# Patient Record
Sex: FEMALE | Race: WHITE | Employment: OTHER | URBAN - METROPOLITAN AREA
[De-identification: names, ages, dates, MRNs, and addresses within clinical notes are randomized per-mention and may not be internally consistent; named-entity substitution may affect disease eponyms.]

---

## 2017-09-01 PROBLEM — I87.2 VENOUS INSUFFICIENCY (CHRONIC) (PERIPHERAL): Status: ACTIVE | Noted: 2017-09-01

## 2017-09-01 PROBLEM — L30.9 DERMATITIS: Status: ACTIVE | Noted: 2017-09-01

## 2018-03-13 ENCOUNTER — OFFICE VISIT (OUTPATIENT)
Dept: PRIMARY CARE CLINIC | Age: 83
End: 2018-03-13
Payer: COMMERCIAL

## 2018-03-13 VITALS
RESPIRATION RATE: 16 BRPM | OXYGEN SATURATION: 98 % | HEIGHT: 61 IN | DIASTOLIC BLOOD PRESSURE: 60 MMHG | HEART RATE: 70 BPM | SYSTOLIC BLOOD PRESSURE: 110 MMHG

## 2018-03-13 DIAGNOSIS — S81.801D WOUND OF RIGHT LOWER EXTREMITY, SUBSEQUENT ENCOUNTER: ICD-10-CM

## 2018-03-13 DIAGNOSIS — J20.9 ACUTE BRONCHITIS, UNSPECIFIED ORGANISM: Primary | ICD-10-CM

## 2018-03-13 PROCEDURE — 99213 OFFICE O/P EST LOW 20 MIN: CPT | Performed by: PHYSICIAN ASSISTANT

## 2018-03-13 RX ORDER — ALPRAZOLAM 0.5 MG/1
0.5 TABLET ORAL NIGHTLY PRN
Qty: 30 TABLET | Refills: 0 | Status: SHIPPED | OUTPATIENT
Start: 2018-03-13 | End: 2018-04-05 | Stop reason: SDUPTHER

## 2018-03-13 RX ORDER — DOXYCYCLINE HYCLATE 100 MG
100 TABLET ORAL 2 TIMES DAILY
Qty: 20 TABLET | Refills: 0 | Status: SHIPPED | OUTPATIENT
Start: 2018-03-13 | End: 2018-03-23

## 2018-03-13 RX ORDER — ALPRAZOLAM 0.5 MG/1
0.5 TABLET ORAL NIGHTLY PRN
Qty: 30 TABLET | Status: CANCELLED | OUTPATIENT
Start: 2018-03-13

## 2018-03-13 NOTE — PROGRESS NOTES
Chief Complaint:   Cough (non productive, chest congestion, began 1 week ago) and Fall (pt fell and was treated in the ER she states it is not getting any better)    History of Present Illness   Source of history provided by:  patient. Jaquelin Mendosa is a 81 y.o. old female with a past medical history of:   Past Medical History:   Diagnosis Date    Hyperlipidemia     Hypertension    presents with a cough for the past 7 days. States the cough is not productive. She is a smoker. Denies any fever, chills, sore throat, headache, ear pain, N/V/D, abdominal pain, CP, progressive SOB, dizziness, or lethargy. Patient also reports recent ER visit due to a fall which causes a skin tear on the right shin. Tdap was up to date and she was give Keflex which she has been taking as directed. She states redness has worsened the last few days. She has a history of wounds that have required wound care visits. She does not have a history of DM. Denies drainage from the area, fever, chills, myalgias, nausea, vomiting, swelling, or any streaking. ROS    Unless otherwise stated in this report or unable to obtain because of the patient's clinical or mental status as evidenced by the medical record, this patients's positive and negative responses for Review of Systems, constitutional, psych, eyes, ENT, cardiovascular, respiratory, gastrointestinal, neurological, genitourinary, musculoskeletal, integument systems and systems related to the presenting problem are either stated in the preceding or were not pertinent or were negative for the symptoms and/or complaints related to the medical problem. Past Surgical History:  has no past surgical history on file. Social History:  reports that she has been smoking Cigarettes. She has been smoking about 1.00 pack per day. She has never used smokeless tobacco. She reports that she does not drink alcohol or use drugs. Family History: family history is not on file.    Allergies:

## 2018-03-21 ENCOUNTER — HOSPITAL ENCOUNTER (OUTPATIENT)
Dept: WOUND CARE | Age: 83
Discharge: HOME | End: 2018-03-21

## 2018-03-22 DIAGNOSIS — Z79.899 HIGH RISK MEDICATION USE: ICD-10-CM

## 2018-03-22 DIAGNOSIS — N18.30 CHRONIC KIDNEY DISEASE, STAGE III (MODERATE) (HCC): ICD-10-CM

## 2018-03-22 DIAGNOSIS — E55.9 VITAMIN D DEFICIENCY: ICD-10-CM

## 2018-03-22 DIAGNOSIS — J44.9 CHRONIC OBSTRUCTIVE PULMONARY DISEASE, UNSPECIFIED COPD TYPE (HCC): ICD-10-CM

## 2018-03-22 DIAGNOSIS — E78.49 OTHER HYPERLIPIDEMIA: ICD-10-CM

## 2018-03-22 DIAGNOSIS — R53.83 FATIGUE, UNSPECIFIED TYPE: Primary | ICD-10-CM

## 2018-03-28 ENCOUNTER — HOSPITAL ENCOUNTER (OUTPATIENT)
Dept: WOUND CARE | Age: 83
Discharge: HOME | End: 2018-03-28
Payer: MEDICARE

## 2018-03-28 ENCOUNTER — HOSPITAL ENCOUNTER (OUTPATIENT)
Age: 83
Discharge: HOME | End: 2018-03-30
Payer: MEDICARE

## 2018-03-28 VITALS
HEIGHT: 61 IN | DIASTOLIC BLOOD PRESSURE: 60 MMHG | WEIGHT: 170 LBS | SYSTOLIC BLOOD PRESSURE: 92 MMHG | TEMPERATURE: 98 F | HEART RATE: 72 BPM | RESPIRATION RATE: 18 BRPM | BODY MASS INDEX: 32.1 KG/M2

## 2018-03-28 DIAGNOSIS — I82.4Z9 DEEP VEIN THROMBOSIS (DVT) OF DISTAL VEIN OF LOWER EXTREMITY, UNSPECIFIED CHRONICITY, UNSPECIFIED LATERALITY (HCC): ICD-10-CM

## 2018-03-28 DIAGNOSIS — I73.9 PVD (PERIPHERAL VASCULAR DISEASE) (HCC): Primary | ICD-10-CM

## 2018-03-28 DIAGNOSIS — L97.912 TRAUMATIC ULCER OF LOWER LEG, RIGHT, WITH FAT LAYER EXPOSED (HCC): ICD-10-CM

## 2018-03-28 PROCEDURE — 99213 OFFICE O/P EST LOW 20 MIN: CPT

## 2018-03-28 PROCEDURE — 87075 CULTR BACTERIA EXCEPT BLOOD: CPT

## 2018-03-28 PROCEDURE — 87070 CULTURE OTHR SPECIMN AEROBIC: CPT

## 2018-03-28 PROCEDURE — 87077 CULTURE AEROBIC IDENTIFY: CPT

## 2018-03-28 PROCEDURE — 87186 SC STD MICRODIL/AGAR DIL: CPT

## 2018-03-28 PROCEDURE — 11042 DBRDMT SUBQ TIS 1ST 20SQCM/<: CPT

## 2018-03-29 ENCOUNTER — HOSPITAL ENCOUNTER (OUTPATIENT)
Age: 83
Discharge: HOME | End: 2018-03-31
Payer: MEDICARE

## 2018-03-29 DIAGNOSIS — J44.9 CHRONIC OBSTRUCTIVE PULMONARY DISEASE, UNSPECIFIED COPD TYPE (HCC): ICD-10-CM

## 2018-03-29 DIAGNOSIS — Z79.899 HIGH RISK MEDICATION USE: ICD-10-CM

## 2018-03-29 DIAGNOSIS — E78.49 OTHER HYPERLIPIDEMIA: ICD-10-CM

## 2018-03-29 DIAGNOSIS — R53.83 FATIGUE, UNSPECIFIED TYPE: ICD-10-CM

## 2018-03-29 DIAGNOSIS — E55.9 VITAMIN D DEFICIENCY: ICD-10-CM

## 2018-03-29 DIAGNOSIS — N18.30 CHRONIC KIDNEY DISEASE, STAGE III (MODERATE) (HCC): ICD-10-CM

## 2018-03-29 LAB
ALBUMIN SERPL-MCNC: 3.7 G/DL (ref 3.5–5.2)
ALP BLD-CCNC: 87 U/L (ref 35–104)
ALT SERPL-CCNC: 13 U/L (ref 0–32)
ANION GAP SERPL CALCULATED.3IONS-SCNC: 16 MMOL/L (ref 7–16)
AST SERPL-CCNC: 22 U/L (ref 0–31)
BACTERIA: ABNORMAL /HPF
BILIRUB SERPL-MCNC: 0.5 MG/DL (ref 0–1.2)
BILIRUBIN URINE: NEGATIVE
BLOOD, URINE: NEGATIVE
BUN BLDV-MCNC: 25 MG/DL (ref 8–23)
CALCIUM SERPL-MCNC: 8.8 MG/DL (ref 8.6–10.2)
CHLORIDE BLD-SCNC: 104 MMOL/L (ref 98–107)
CHOLESTEROL, TOTAL: 102 MG/DL (ref 0–199)
CLARITY: CLEAR
CO2: 22 MMOL/L (ref 22–29)
COLOR: YELLOW
CREAT SERPL-MCNC: 1.1 MG/DL (ref 0.5–1)
CREATININE URINE: 174 MG/DL (ref 29–226)
GFR AFRICAN AMERICAN: 58
GFR NON-AFRICAN AMERICAN: 48 ML/MIN/1.73
GLUCOSE BLD-MCNC: 88 MG/DL (ref 74–109)
GLUCOSE URINE: NEGATIVE MG/DL
HBA1C MFR BLD: 5.4 % (ref 4.8–5.9)
HCT VFR BLD CALC: 41.5 % (ref 34–48)
HDLC SERPL-MCNC: 39 MG/DL
HEMOGLOBIN: 13.6 G/DL (ref 11.5–15.5)
KETONES, URINE: NEGATIVE MG/DL
LDL CHOLESTEROL CALCULATED: 25 MG/DL (ref 0–99)
LEUKOCYTE ESTERASE, URINE: ABNORMAL
MCH RBC QN AUTO: 31.9 PG (ref 26–35)
MCHC RBC AUTO-ENTMCNC: 32.8 % (ref 32–34.5)
MCV RBC AUTO: 97.4 FL (ref 80–99.9)
MICROALBUMIN UR-MCNC: 36.1 MG/L
MICROALBUMIN/CREAT UR-RTO: 20.7 (ref 0–30)
NITRITE, URINE: NEGATIVE
PDW BLD-RTO: 12.6 FL (ref 11.5–15)
PH UA: 5 (ref 5–9)
PLATELET # BLD: 222 E9/L (ref 130–450)
PMV BLD AUTO: 14.1 FL (ref 7–12)
POTASSIUM SERPL-SCNC: 4.4 MMOL/L (ref 3.5–5)
PROTEIN UA: NEGATIVE MG/DL
RBC # BLD: 4.26 E12/L (ref 3.5–5.5)
RBC UA: ABNORMAL /HPF (ref 0–2)
RENAL EPITHELIAL, UA: ABNORMAL /HPF
SODIUM BLD-SCNC: 142 MMOL/L (ref 132–146)
SPECIFIC GRAVITY UA: 1.02 (ref 1–1.03)
TOTAL PROTEIN: 6.5 G/DL (ref 6.4–8.3)
TRIGL SERPL-MCNC: 188 MG/DL (ref 0–149)
TSH SERPL DL<=0.05 MIU/L-ACNC: 2.28 UIU/ML (ref 0.27–4.2)
URIC ACID, SERUM: 6.7 MG/DL (ref 2.4–5.7)
UROBILINOGEN, URINE: 0.2 E.U./DL
VITAMIN D 25-HYDROXY: 30 NG/ML (ref 30–100)
VLDLC SERPL CALC-MCNC: 38 MG/DL
WBC # BLD: 7.6 E9/L (ref 4.5–11.5)
WBC UA: ABNORMAL /HPF (ref 0–5)

## 2018-03-29 PROCEDURE — 82044 UR ALBUMIN SEMIQUANTITATIVE: CPT

## 2018-03-29 PROCEDURE — 82306 VITAMIN D 25 HYDROXY: CPT

## 2018-03-29 PROCEDURE — 82570 ASSAY OF URINE CREATININE: CPT

## 2018-03-29 PROCEDURE — 85027 COMPLETE CBC AUTOMATED: CPT

## 2018-03-29 PROCEDURE — 84550 ASSAY OF BLOOD/URIC ACID: CPT

## 2018-03-29 PROCEDURE — 84443 ASSAY THYROID STIM HORMONE: CPT

## 2018-03-29 PROCEDURE — 80053 COMPREHEN METABOLIC PANEL: CPT

## 2018-03-29 PROCEDURE — 81001 URINALYSIS AUTO W/SCOPE: CPT

## 2018-03-29 PROCEDURE — 83036 HEMOGLOBIN GLYCOSYLATED A1C: CPT

## 2018-03-29 PROCEDURE — 80061 LIPID PANEL: CPT

## 2018-03-30 LAB — ANAEROBIC CULTURE: NORMAL

## 2018-03-31 LAB
ORGANISM: ABNORMAL
ORGANISM: ABNORMAL
WOUND/ABSCESS: ABNORMAL

## 2018-04-04 ENCOUNTER — HOSPITAL ENCOUNTER (OUTPATIENT)
Dept: WOUND CARE | Age: 83
Discharge: HOME | End: 2018-04-04

## 2018-04-05 ENCOUNTER — OFFICE VISIT (OUTPATIENT)
Dept: PRIMARY CARE CLINIC | Age: 83
End: 2018-04-05
Payer: MEDICARE

## 2018-04-05 VITALS
BODY MASS INDEX: 31.91 KG/M2 | RESPIRATION RATE: 12 BRPM | SYSTOLIC BLOOD PRESSURE: 104 MMHG | OXYGEN SATURATION: 97 % | TEMPERATURE: 98.2 F | HEIGHT: 61 IN | WEIGHT: 169 LBS | HEART RATE: 63 BPM | DIASTOLIC BLOOD PRESSURE: 62 MMHG

## 2018-04-05 DIAGNOSIS — E78.2 MIXED HYPERLIPIDEMIA: ICD-10-CM

## 2018-04-05 DIAGNOSIS — F17.200 SMOKER: ICD-10-CM

## 2018-04-05 DIAGNOSIS — R53.83 FATIGUE, UNSPECIFIED TYPE: ICD-10-CM

## 2018-04-05 DIAGNOSIS — S81.801D WOUND OF RIGHT LOWER EXTREMITY, SUBSEQUENT ENCOUNTER: ICD-10-CM

## 2018-04-05 DIAGNOSIS — R73.09 ELEVATED HEMOGLOBIN A1C: ICD-10-CM

## 2018-04-05 DIAGNOSIS — I35.0 NONRHEUMATIC AORTIC VALVE STENOSIS: ICD-10-CM

## 2018-04-05 DIAGNOSIS — J43.0 UNILATERAL EMPHYSEMA (HCC): ICD-10-CM

## 2018-04-05 DIAGNOSIS — I10 ESSENTIAL HYPERTENSION: ICD-10-CM

## 2018-04-05 DIAGNOSIS — M89.9 DISORDER OF BONE: ICD-10-CM

## 2018-04-05 PROCEDURE — 99214 OFFICE O/P EST MOD 30 MIN: CPT | Performed by: INTERNAL MEDICINE

## 2018-04-05 RX ORDER — ROSUVASTATIN CALCIUM 10 MG/1
10 TABLET, COATED ORAL DAILY
Qty: 30 TABLET | Refills: 5 | Status: SHIPPED | OUTPATIENT
Start: 2018-04-05 | End: 2018-10-17 | Stop reason: SDUPTHER

## 2018-04-05 RX ORDER — VALSARTAN 80 MG/1
80 TABLET ORAL DAILY
Qty: 30 TABLET | Refills: 5 | Status: SHIPPED | OUTPATIENT
Start: 2018-04-05 | End: 2018-10-17

## 2018-04-05 RX ORDER — ALPRAZOLAM 0.5 MG/1
TABLET ORAL
Qty: 30 TABLET | Refills: 2 | Status: SHIPPED | OUTPATIENT
Start: 2018-04-05 | End: 2018-07-05

## 2018-04-05 ASSESSMENT — ENCOUNTER SYMPTOMS
EYE REDNESS: 0
DIARRHEA: 0
STRIDOR: 0
BLURRED VISION: 0
DOUBLE VISION: 0
BLOOD IN STOOL: 0
SHORTNESS OF BREATH: 0
HEMOPTYSIS: 0
EYE PAIN: 0
NAUSEA: 0

## 2018-04-05 ASSESSMENT — PATIENT HEALTH QUESTIONNAIRE - PHQ9
2. FEELING DOWN, DEPRESSED OR HOPELESS: 1
SUM OF ALL RESPONSES TO PHQ QUESTIONS 1-9: 2
SUM OF ALL RESPONSES TO PHQ9 QUESTIONS 1 & 2: 2
1. LITTLE INTEREST OR PLEASURE IN DOING THINGS: 1

## 2018-04-10 ENCOUNTER — HOSPITAL ENCOUNTER (OUTPATIENT)
Dept: ULTRASOUND IMAGING | Age: 83
Discharge: HOME | End: 2018-04-12
Payer: MEDICARE

## 2018-04-10 ENCOUNTER — HOSPITAL ENCOUNTER (OUTPATIENT)
Dept: INTERVENTIONAL RADIOLOGY/VASCULAR | Age: 83
Discharge: HOME | End: 2018-04-12
Payer: MEDICARE

## 2018-04-10 DIAGNOSIS — I82.4Z9 DEEP VEIN THROMBOSIS (DVT) OF DISTAL VEIN OF LOWER EXTREMITY, UNSPECIFIED CHRONICITY, UNSPECIFIED LATERALITY (HCC): ICD-10-CM

## 2018-04-10 DIAGNOSIS — I73.9 PVD (PERIPHERAL VASCULAR DISEASE) (HCC): ICD-10-CM

## 2018-04-10 PROCEDURE — 93923 UPR/LXTR ART STDY 3+ LVLS: CPT

## 2018-04-10 PROCEDURE — 93970 EXTREMITY STUDY: CPT

## 2018-04-11 ENCOUNTER — HOSPITAL ENCOUNTER (OUTPATIENT)
Dept: WOUND CARE | Age: 83
Discharge: HOME | End: 2018-04-11
Payer: MEDICARE

## 2018-04-11 VITALS
DIASTOLIC BLOOD PRESSURE: 62 MMHG | RESPIRATION RATE: 16 BRPM | HEIGHT: 61 IN | BODY MASS INDEX: 31.91 KG/M2 | HEART RATE: 68 BPM | SYSTOLIC BLOOD PRESSURE: 98 MMHG | TEMPERATURE: 97.4 F | WEIGHT: 169 LBS

## 2018-04-11 DIAGNOSIS — L97.912 TRAUMATIC ULCER OF LOWER LEG, RIGHT, WITH FAT LAYER EXPOSED (HCC): ICD-10-CM

## 2018-04-11 PROCEDURE — 99211 OFF/OP EST MAY X REQ PHY/QHP: CPT

## 2018-08-07 ENCOUNTER — TELEPHONE (OUTPATIENT)
Dept: PRIMARY CARE CLINIC | Age: 83
End: 2018-08-07

## 2018-08-07 DIAGNOSIS — I10 ESSENTIAL HYPERTENSION: Primary | ICD-10-CM

## 2018-08-07 RX ORDER — IRBESARTAN 75 MG/1
75 TABLET ORAL DAILY
Qty: 30 TABLET | Refills: 3 | Status: SHIPPED | OUTPATIENT
Start: 2018-08-07 | End: 2018-10-17 | Stop reason: SDUPTHER

## 2018-08-07 NOTE — TELEPHONE ENCOUNTER
Patient on valsartan 80 mg, will change to Avapro 75 mg 1 daily 30 pills with 3 refills provided to the pharmacy on record

## 2018-09-14 ENCOUNTER — TELEPHONE (OUTPATIENT)
Dept: PRIMARY CARE CLINIC | Age: 83
End: 2018-09-14

## 2018-09-14 NOTE — TELEPHONE ENCOUNTER
RX for Alprazolam was faxed to [facility] [phone] on 9/11/18. Confirmed successful transmission.

## 2018-10-09 ENCOUNTER — TELEPHONE (OUTPATIENT)
Dept: PRIMARY CARE CLINIC | Age: 83
End: 2018-10-09

## 2018-10-09 DIAGNOSIS — R53.83 FATIGUE, UNSPECIFIED TYPE: ICD-10-CM

## 2018-10-09 DIAGNOSIS — J43.0 UNILATERAL EMPHYSEMA (HCC): Primary | ICD-10-CM

## 2018-10-09 DIAGNOSIS — I10 ESSENTIAL HYPERTENSION: ICD-10-CM

## 2018-10-09 DIAGNOSIS — I87.2 VENOUS INSUFFICIENCY (CHRONIC) (PERIPHERAL): ICD-10-CM

## 2018-10-09 DIAGNOSIS — I35.0 NONRHEUMATIC AORTIC VALVE STENOSIS: ICD-10-CM

## 2018-10-09 DIAGNOSIS — M89.9 DISORDER OF BONE: ICD-10-CM

## 2018-10-09 DIAGNOSIS — F17.200 SMOKER: ICD-10-CM

## 2018-10-09 DIAGNOSIS — R73.09 ELEVATED HEMOGLOBIN A1C: ICD-10-CM

## 2018-10-10 ENCOUNTER — HOSPITAL ENCOUNTER (OUTPATIENT)
Age: 83
Discharge: HOME | End: 2018-10-12
Payer: MEDICARE

## 2018-10-10 DIAGNOSIS — I87.2 VENOUS INSUFFICIENCY (CHRONIC) (PERIPHERAL): ICD-10-CM

## 2018-10-10 DIAGNOSIS — F17.200 SMOKER: ICD-10-CM

## 2018-10-10 DIAGNOSIS — I35.0 NONRHEUMATIC AORTIC VALVE STENOSIS: ICD-10-CM

## 2018-10-10 DIAGNOSIS — R53.83 FATIGUE, UNSPECIFIED TYPE: ICD-10-CM

## 2018-10-10 DIAGNOSIS — I10 ESSENTIAL HYPERTENSION: ICD-10-CM

## 2018-10-10 DIAGNOSIS — R73.09 ELEVATED HEMOGLOBIN A1C: ICD-10-CM

## 2018-10-10 DIAGNOSIS — J43.0 UNILATERAL EMPHYSEMA (HCC): ICD-10-CM

## 2018-10-10 DIAGNOSIS — M89.9 DISORDER OF BONE: ICD-10-CM

## 2018-10-10 LAB
ALBUMIN SERPL-MCNC: 3.9 G/DL (ref 3.5–5.2)
ALP BLD-CCNC: 98 U/L (ref 35–104)
ALT SERPL-CCNC: 17 U/L (ref 0–32)
ANION GAP SERPL CALCULATED.3IONS-SCNC: 18 MMOL/L (ref 7–16)
AST SERPL-CCNC: 24 U/L (ref 0–31)
BASOPHILS ABSOLUTE: 0.05 E9/L (ref 0–0.2)
BASOPHILS RELATIVE PERCENT: 0.6 % (ref 0–2)
BILIRUB SERPL-MCNC: 0.2 MG/DL (ref 0–1.2)
BILIRUBIN URINE: NEGATIVE
BLOOD, URINE: NEGATIVE
BUN BLDV-MCNC: 20 MG/DL (ref 8–23)
CALCIUM SERPL-MCNC: 8.6 MG/DL (ref 8.6–10.2)
CHLORIDE BLD-SCNC: 102 MMOL/L (ref 98–107)
CHOLESTEROL, TOTAL: 118 MG/DL (ref 0–199)
CLARITY: CLEAR
CO2: 20 MMOL/L (ref 22–29)
COLOR: YELLOW
CREAT SERPL-MCNC: 1.2 MG/DL (ref 0.5–1)
EOSINOPHILS ABSOLUTE: 0 E9/L (ref 0.05–0.5)
EOSINOPHILS RELATIVE PERCENT: 0 % (ref 0–6)
GFR AFRICAN AMERICAN: 52
GFR NON-AFRICAN AMERICAN: 43 ML/MIN/1.73
GLUCOSE BLD-MCNC: 89 MG/DL (ref 74–109)
GLUCOSE URINE: NEGATIVE MG/DL
HBA1C MFR BLD: 5.3 % (ref 4–5.6)
HCT VFR BLD CALC: 40.4 % (ref 34–48)
HDLC SERPL-MCNC: 36 MG/DL
HEMOGLOBIN: 13.1 G/DL (ref 11.5–15.5)
IMMATURE GRANULOCYTES #: 0.02 E9/L
IMMATURE GRANULOCYTES %: 0.2 % (ref 0–5)
KETONES, URINE: NEGATIVE MG/DL
LDL CHOLESTEROL CALCULATED: 46 MG/DL (ref 0–99)
LEUKOCYTE ESTERASE, URINE: NEGATIVE
LYMPHOCYTES ABSOLUTE: 2.61 E9/L (ref 1.5–4)
LYMPHOCYTES RELATIVE PERCENT: 31.3 % (ref 20–42)
MCH RBC QN AUTO: 31.6 PG (ref 26–35)
MCHC RBC AUTO-ENTMCNC: 32.4 % (ref 32–34.5)
MCV RBC AUTO: 97.3 FL (ref 80–99.9)
MONOCYTES ABSOLUTE: 0.58 E9/L (ref 0.1–0.95)
MONOCYTES RELATIVE PERCENT: 6.9 % (ref 2–12)
NEUTROPHILS ABSOLUTE: 5.09 E9/L (ref 1.8–7.3)
NEUTROPHILS RELATIVE PERCENT: 61 % (ref 43–80)
NITRITE, URINE: NEGATIVE
PDW BLD-RTO: 12.7 FL (ref 11.5–15)
PH UA: 5 (ref 5–9)
PLATELET # BLD: 225 E9/L (ref 130–450)
PMV BLD AUTO: 13.8 FL (ref 7–12)
POTASSIUM SERPL-SCNC: 4.3 MMOL/L (ref 3.5–5)
PROTEIN UA: NEGATIVE MG/DL
RBC # BLD: 4.15 E12/L (ref 3.5–5.5)
SODIUM BLD-SCNC: 140 MMOL/L (ref 132–146)
SPECIFIC GRAVITY UA: 1.02 (ref 1–1.03)
TOTAL PROTEIN: 6.7 G/DL (ref 6.4–8.3)
TRIGL SERPL-MCNC: 179 MG/DL (ref 0–149)
TSH SERPL DL<=0.05 MIU/L-ACNC: 2.53 UIU/ML (ref 0.27–4.2)
UROBILINOGEN, URINE: 0.2 E.U./DL
VLDLC SERPL CALC-MCNC: 36 MG/DL
WBC # BLD: 8.4 E9/L (ref 4.5–11.5)

## 2018-10-10 PROCEDURE — 83036 HEMOGLOBIN GLYCOSYLATED A1C: CPT

## 2018-10-10 PROCEDURE — 85025 COMPLETE CBC W/AUTO DIFF WBC: CPT

## 2018-10-10 PROCEDURE — 82652 VIT D 1 25-DIHYDROXY: CPT

## 2018-10-10 PROCEDURE — 84443 ASSAY THYROID STIM HORMONE: CPT

## 2018-10-10 PROCEDURE — 80053 COMPREHEN METABOLIC PANEL: CPT

## 2018-10-10 PROCEDURE — 80061 LIPID PANEL: CPT

## 2018-10-10 PROCEDURE — 81003 URINALYSIS AUTO W/O SCOPE: CPT

## 2018-10-13 LAB — VITAMIN D 1,25-DIHYDROXY: 31.4 PG/ML (ref 19.9–79.3)

## 2018-10-17 ENCOUNTER — OFFICE VISIT (OUTPATIENT)
Dept: PRIMARY CARE CLINIC | Age: 83
End: 2018-10-17
Payer: MEDICARE

## 2018-10-17 VITALS
RESPIRATION RATE: 18 BRPM | HEIGHT: 61 IN | SYSTOLIC BLOOD PRESSURE: 112 MMHG | WEIGHT: 179 LBS | DIASTOLIC BLOOD PRESSURE: 62 MMHG | TEMPERATURE: 98.3 F | BODY MASS INDEX: 33.79 KG/M2 | HEART RATE: 91 BPM | OXYGEN SATURATION: 98 %

## 2018-10-17 DIAGNOSIS — F51.01 PRIMARY INSOMNIA: ICD-10-CM

## 2018-10-17 DIAGNOSIS — Z00.00 ROUTINE GENERAL MEDICAL EXAMINATION AT A HEALTH CARE FACILITY: ICD-10-CM

## 2018-10-17 DIAGNOSIS — Z71.85 IMMUNIZATION COUNSELING: ICD-10-CM

## 2018-10-17 DIAGNOSIS — E78.2 MIXED HYPERLIPIDEMIA: ICD-10-CM

## 2018-10-17 DIAGNOSIS — I35.0 NONRHEUMATIC AORTIC VALVE STENOSIS: ICD-10-CM

## 2018-10-17 DIAGNOSIS — Z23 ENCOUNTER FOR IMMUNIZATION: Primary | ICD-10-CM

## 2018-10-17 DIAGNOSIS — J44.9 CHRONIC OBSTRUCTIVE PULMONARY DISEASE, UNSPECIFIED COPD TYPE (HCC): ICD-10-CM

## 2018-10-17 DIAGNOSIS — I10 ESSENTIAL HYPERTENSION: ICD-10-CM

## 2018-10-17 DIAGNOSIS — F41.9 ANXIETY: ICD-10-CM

## 2018-10-17 PROCEDURE — 99214 OFFICE O/P EST MOD 30 MIN: CPT | Performed by: INTERNAL MEDICINE

## 2018-10-17 RX ORDER — MULTIVITAMIN WITH IRON
100 TABLET ORAL DAILY
COMMUNITY

## 2018-10-17 RX ORDER — ROSUVASTATIN CALCIUM 10 MG/1
10 TABLET, COATED ORAL DAILY
Qty: 30 TABLET | Refills: 3 | Status: SHIPPED | OUTPATIENT
Start: 2018-10-17 | End: 2019-03-11 | Stop reason: SDUPTHER

## 2018-10-17 RX ORDER — ALPRAZOLAM 0.5 MG/1
0.5 TABLET ORAL NIGHTLY PRN
Qty: 30 TABLET | Refills: 2 | Status: SHIPPED | OUTPATIENT
Start: 2018-10-17 | End: 2019-01-17 | Stop reason: SDUPTHER

## 2018-10-17 RX ORDER — FLUOCINONIDE 0.5 MG/G
OINTMENT TOPICAL
Refills: 3 | COMMUNITY
Start: 2018-08-29 | End: 2021-01-11 | Stop reason: SDUPTHER

## 2018-10-17 RX ORDER — LANOLIN ALCOHOL/MO/W.PET/CERES
1000 CREAM (GRAM) TOPICAL DAILY
COMMUNITY

## 2018-10-17 RX ORDER — ALPRAZOLAM 0.5 MG/1
0.5 TABLET ORAL NIGHTLY PRN
COMMUNITY
End: 2018-10-17 | Stop reason: SDUPTHER

## 2018-10-17 RX ORDER — IRBESARTAN 75 MG/1
75 TABLET ORAL DAILY
Qty: 30 TABLET | Refills: 3 | Status: SHIPPED | OUTPATIENT
Start: 2018-10-17 | End: 2019-05-28

## 2018-10-17 ASSESSMENT — LIFESTYLE VARIABLES: HOW OFTEN DO YOU HAVE A DRINK CONTAINING ALCOHOL: 0

## 2018-10-17 NOTE — PATIENT INSTRUCTIONS
Personalized Preventive Plan for Beni Ramos - 10/17/2018  Medicare offers a range of preventive health benefits. Some of the tests and screenings are paid in full while other may be subject to a deductible, co-insurance, and/or copay. Some of these benefits include a comprehensive review of your medical history including lifestyle, illnesses that may run in your family, and various assessments and screenings as appropriate. After reviewing your medical record and screening and assessments performed today your provider may have ordered immunizations, labs, imaging, and/or referrals for you. A list of these orders (if applicable) as well as your Preventive Care list are included within your After Visit Summary for your review. Other Preventive Recommendations:    · A preventive eye exam performed by an eye specialist is recommended every 1-2 years to screen for glaucoma; cataracts, macular degeneration, and other eye disorders. · A preventive dental visit is recommended every 6 months. · Try to get at least 150 minutes of exercise per week or 10,000 steps per day on a pedometer . · Order or download the FREE \"Exercise & Physical Activity: Your Everyday Guide\" from [employer]. Call 4-115.619.2470 or search [employer] online. · You need 1140-3910 mg of calcium and 6021-0595 IU of vitamin D per day. It is possible to meet your calcium requirement with diet alone, but a vitamin D supplement is usually necessary to meet this goal.  · When exposed to the sun, use a sunscreen that protects against both UVA and UVB radiation with an SPF of 30 or greater. Reapply every 2 to 3 hours or after sweating, drying off with a towel, or swimming. · Always wear a seat belt when traveling in a car. Always wear a helmet when riding a bicycle or motorcycle.

## 2018-10-17 NOTE — PROGRESS NOTES
Medicare Annual Wellness Visit  Name: Bishop Dawn Date: 10/17/2018   MRN: [id number] Sex: Female   Age: 81 y.o. Ethnicity: Non-/Non    : 1935 Race: Jocy Noriega is here for Medicare AWV; Anxiety; Hypertension; Hyperlipidemia; Discuss Labs; and Health Maintenance (due for shingles,dexa scan,pneumo,Flu)    Screenings for behavioral, psychosocial and functional/safety risks, and cognitive dysfunction are all negative except as indicated below. These results, as well as other patient data from the [address] form, are documented in Flowsheets linked to this Encounter. No Known Allergies  Prior to Visit Medications    Medication Sig Taking? Authorizing Provider   fluocinonide (LIDEX) 0.05 % ointment APPLY TWICE A DAY AS NEEDED FOR FLARES Yes Historical Provider, MD   vitamin B-6 (PYRIDOXINE) 100 MG tablet Take 100 mg by mouth daily Yes Historical Provider, MD   Cholecalciferol (VITAMIN D3) 1000 units CAPS Take by mouth daily Yes Historical Provider, MD   vitamin B-12 (CYANOCOBALAMIN) 1000 MCG tablet Take 1,000 mcg by mouth daily Yes Historical Provider, MD   ALPRAZolam (XANAX) 0.5 MG tablet Take 0.5 mg by mouth nightly as needed for Sleep. . Yes Historical Provider, MD   irbesartan (AVAPRO) 75 MG tablet Take 1 tablet by mouth daily Yes Christie Galeas MD   rosuvastatin (CRESTOR) 10 MG tablet Take 1 tablet by mouth daily Yes Christie Galeas MD     Past Medical History:   Diagnosis Date    Aortic stenosis     COPD (chronic obstructive pulmonary disease) ([facility] [address]     Disorder of bone     Elevated hemoglobin A1c     Fatigue     Hyperlipidemia     Hypertension     Smoker     Wound of right leg      No past surgical history on file. No family history on file.     CareTeam (Including outside providers/suppliers regularly involved in providing care):   Patient Care Team:  Christie Galeas MD as PCP - Saskia Cali MD as PCP - JEROMES Attributed

## 2018-10-17 NOTE — PROGRESS NOTES
Annual wellness visit, patient was given a review of past history current complaints    Patient has been followed for the following    1. I agree chronic anxiety and insomnia alprazolam has helped, takes 0.5 mg 1 daily. ,  Today she was given 30 tablets, 2 refills. The patient will call at the end of 3 months and be able to get another refill for that medication. She will then be seen back in 6 months time. 2.  Reviewed immunizations, patient chooses to have these at the pharmacy originally she wanted to have the flu shot here but canceled at the last medic and will have the flu shot done at the pharmacy along with all the other immunizations that I delineated for her including pneumonia and tetanus and shingles    3. The patient was seen by cardiology one year ago for valvular heart disease, an appointment has been made to be seen by [facility] cardiology regarding her aortic valve disease    4. The patient has chronic obstructive pulmonary disease, still smokes cigarettes,   Has seen Jf Armenta, in the past.  I made an appointment for her to be reevaluated. 5.  Laboratory studies done prior to this visit were reviewed generally at goal for the patient. 6.  Patient has declined mammography. 7.  The patient does describe having quite a bit of anxiety and sleeplessness is in depends on the alprazolam for this. I did tell her there are other alternative medications which she can try. However she is not interested in making a transition at this time. Plan  Continue current medications  See cardiology  Pulmonary evaluation  Immunization at pharmacy  Try and reduce use of alprazolam, 0.5, 30 pills, one daily, 2 refills provided today.     Patient will see me back in 6 months time with diagnostic laboratory studies

## 2019-01-07 ENCOUNTER — HOSPITAL ENCOUNTER (EMERGENCY)
Age: 84
Discharge: HOME | End: 2019-01-07
Payer: MEDICARE

## 2019-01-07 ENCOUNTER — TELEPHONE (OUTPATIENT)
Dept: PRIMARY CARE CLINIC | Age: 84
End: 2019-01-07

## 2019-01-07 VITALS
HEIGHT: 61 IN | HEART RATE: 73 BPM | SYSTOLIC BLOOD PRESSURE: 118 MMHG | RESPIRATION RATE: 16 BRPM | TEMPERATURE: 98.1 F | BODY MASS INDEX: 33.99 KG/M2 | DIASTOLIC BLOOD PRESSURE: 56 MMHG | WEIGHT: 180 LBS | OXYGEN SATURATION: 97 %

## 2019-01-07 DIAGNOSIS — Z51.89 ENCOUNTER FOR POST-TRAUMATIC WOUND CHECK: Primary | ICD-10-CM

## 2019-01-07 PROCEDURE — 99282 EMERGENCY DEPT VISIT SF MDM: CPT

## 2019-01-07 PROCEDURE — 6370000000 HC RX 637 (ALT 250 FOR IP): Performed by: PHYSICIAN ASSISTANT

## 2019-01-07 RX ORDER — GINSENG 100 MG
CAPSULE ORAL ONCE
Status: COMPLETED | OUTPATIENT
Start: 2019-01-07 | End: 2019-01-07

## 2019-01-07 RX ADMIN — BACITRACIN: 500 OINTMENT TOPICAL at 12:40

## 2019-01-09 ENCOUNTER — HOSPITAL ENCOUNTER (EMERGENCY)
Age: 84
Discharge: HOME | End: 2019-01-09
Attending: EMERGENCY MEDICINE
Payer: MEDICARE

## 2019-01-09 ENCOUNTER — CARE COORDINATION (OUTPATIENT)
Dept: CARE COORDINATION | Age: 84
End: 2019-01-09

## 2019-01-09 VITALS
SYSTOLIC BLOOD PRESSURE: 138 MMHG | TEMPERATURE: 97.7 F | OXYGEN SATURATION: 98 % | BODY MASS INDEX: 33.99 KG/M2 | HEIGHT: 61 IN | RESPIRATION RATE: 18 BRPM | HEART RATE: 82 BPM | WEIGHT: 180 LBS | DIASTOLIC BLOOD PRESSURE: 64 MMHG

## 2019-01-09 DIAGNOSIS — L08.9 LEG ABRASION, INFECTED, LEFT, INITIAL ENCOUNTER: Primary | ICD-10-CM

## 2019-01-09 DIAGNOSIS — F17.210 CIGARETTE SMOKER: ICD-10-CM

## 2019-01-09 DIAGNOSIS — S80.812A LEG ABRASION, INFECTED, LEFT, INITIAL ENCOUNTER: Primary | ICD-10-CM

## 2019-01-09 DIAGNOSIS — I87.8 CHRONIC VENOUS STASIS: ICD-10-CM

## 2019-01-09 DIAGNOSIS — Z23 NEED FOR TDAP VACCINATION: ICD-10-CM

## 2019-01-09 PROCEDURE — 6360000002 HC RX W HCPCS: Performed by: NURSE PRACTITIONER

## 2019-01-09 PROCEDURE — 90715 TDAP VACCINE 7 YRS/> IM: CPT | Performed by: NURSE PRACTITIONER

## 2019-01-09 PROCEDURE — 90471 IMMUNIZATION ADMIN: CPT | Performed by: NURSE PRACTITIONER

## 2019-01-09 PROCEDURE — 99282 EMERGENCY DEPT VISIT SF MDM: CPT

## 2019-01-09 RX ORDER — CEPHALEXIN 500 MG/1
500 CAPSULE ORAL 4 TIMES DAILY
Qty: 40 CAPSULE | Refills: 0 | Status: SHIPPED | OUTPATIENT
Start: 2019-01-09 | End: 2019-01-19

## 2019-01-09 RX ADMIN — TETANUS TOXOID, REDUCED DIPHTHERIA TOXOID AND ACELLULAR PERTUSSIS VACCINE, ADSORBED 0.5 ML: 5; 2.5; 8; 8; 2.5 SUSPENSION INTRAMUSCULAR at 13:20

## 2019-01-09 ASSESSMENT — PAIN DESCRIPTION - LOCATION: LOCATION: LEG

## 2019-01-09 ASSESSMENT — PAIN SCALES - GENERAL: PAINLEVEL_OUTOF10: 7

## 2019-01-09 ASSESSMENT — PAIN DESCRIPTION - PAIN TYPE: TYPE: ACUTE PAIN

## 2019-01-09 ASSESSMENT — PAIN DESCRIPTION - FREQUENCY: FREQUENCY: INTERMITTENT

## 2019-01-09 ASSESSMENT — PAIN DESCRIPTION - PROGRESSION: CLINICAL_PROGRESSION: NOT CHANGED

## 2019-01-09 ASSESSMENT — PAIN DESCRIPTION - ORIENTATION: ORIENTATION: LEFT;LOWER

## 2019-01-09 ASSESSMENT — PAIN DESCRIPTION - DESCRIPTORS: DESCRIPTORS: PATIENT UNABLE TO DESCRIBE

## 2019-01-17 DIAGNOSIS — F41.9 ANXIETY: ICD-10-CM

## 2019-01-17 DIAGNOSIS — F51.01 PRIMARY INSOMNIA: Primary | ICD-10-CM

## 2019-01-17 DIAGNOSIS — F51.01 PRIMARY INSOMNIA: ICD-10-CM

## 2019-01-17 RX ORDER — ALPRAZOLAM 0.5 MG/1
0.5 TABLET ORAL NIGHTLY PRN
Qty: 30 TABLET | Refills: 1 | Status: SHIPPED | OUTPATIENT
Start: 2019-01-17 | End: 2019-02-16

## 2019-01-17 RX ORDER — ALPRAZOLAM 0.5 MG/1
0.5 TABLET ORAL NIGHTLY PRN
Qty: 30 TABLET | Refills: 0 | Status: CANCELLED | OUTPATIENT
Start: 2019-01-17 | End: 2019-02-16

## 2019-01-17 RX ORDER — ALPRAZOLAM 0.5 MG/1
TABLET ORAL
Refills: 2 | COMMUNITY
Start: 2018-12-17 | End: 2019-01-17

## 2019-01-17 RX ORDER — ALPRAZOLAM 0.5 MG/1
0.5 TABLET ORAL NIGHTLY PRN
Qty: 30 TABLET | Refills: 0 | OUTPATIENT
Start: 2019-01-17 | End: 2019-02-16

## 2019-01-21 ENCOUNTER — HOSPITAL ENCOUNTER (EMERGENCY)
Age: 84
Discharge: HOME | End: 2019-01-21
Attending: EMERGENCY MEDICINE
Payer: MEDICARE

## 2019-01-21 VITALS
OXYGEN SATURATION: 100 % | BODY MASS INDEX: 30.73 KG/M2 | RESPIRATION RATE: 14 BRPM | TEMPERATURE: 98.1 F | WEIGHT: 180 LBS | HEART RATE: 80 BPM | DIASTOLIC BLOOD PRESSURE: 56 MMHG | SYSTOLIC BLOOD PRESSURE: 124 MMHG | HEIGHT: 64 IN

## 2019-01-21 DIAGNOSIS — Z51.89 VISIT FOR WOUND CHECK: Primary | ICD-10-CM

## 2019-01-21 PROCEDURE — 99282 EMERGENCY DEPT VISIT SF MDM: CPT

## 2019-01-24 ENCOUNTER — CARE COORDINATION (OUTPATIENT)
Dept: CARE COORDINATION | Age: 84
End: 2019-01-24

## 2019-01-28 ENCOUNTER — TELEPHONE (OUTPATIENT)
Dept: PRIMARY CARE CLINIC | Age: 84
End: 2019-01-28

## 2019-01-28 RX ORDER — LOSARTAN POTASSIUM 50 MG/1
50 TABLET ORAL DAILY
Qty: 90 TABLET | Refills: 0 | Status: SHIPPED | OUTPATIENT
Start: 2019-01-28 | End: 2019-05-08 | Stop reason: SDUPTHER

## 2019-02-04 ENCOUNTER — HOSPITAL ENCOUNTER (OUTPATIENT)
Age: 84
Discharge: HOME | End: 2019-02-06
Payer: MEDICARE

## 2019-02-04 ENCOUNTER — OFFICE VISIT (OUTPATIENT)
Dept: FAMILY MEDICINE CLINIC | Age: 84
End: 2019-02-04
Payer: MEDICARE

## 2019-02-04 VITALS
HEIGHT: 61 IN | TEMPERATURE: 97.7 F | OXYGEN SATURATION: 95 % | DIASTOLIC BLOOD PRESSURE: 68 MMHG | WEIGHT: 176.8 LBS | RESPIRATION RATE: 18 BRPM | SYSTOLIC BLOOD PRESSURE: 118 MMHG | BODY MASS INDEX: 33.38 KG/M2 | HEART RATE: 72 BPM

## 2019-02-04 DIAGNOSIS — F51.01 PRIMARY INSOMNIA: ICD-10-CM

## 2019-02-04 DIAGNOSIS — S81.802A WOUND OF LEFT LOWER EXTREMITY, INITIAL ENCOUNTER: ICD-10-CM

## 2019-02-04 DIAGNOSIS — I35.0 NONRHEUMATIC AORTIC VALVE STENOSIS: ICD-10-CM

## 2019-02-04 DIAGNOSIS — J43.0 UNILATERAL EMPHYSEMA (HCC): Primary | ICD-10-CM

## 2019-02-04 DIAGNOSIS — F41.9 ANXIETY: ICD-10-CM

## 2019-02-04 DIAGNOSIS — Z91.81 AT HIGH RISK FOR FALLS: ICD-10-CM

## 2019-02-04 DIAGNOSIS — E78.2 MIXED HYPERLIPIDEMIA: ICD-10-CM

## 2019-02-04 DIAGNOSIS — I10 ESSENTIAL HYPERTENSION: ICD-10-CM

## 2019-02-04 LAB
AMPHETAMINE SCREEN, URINE: NOT DETECTED
BARBITURATE SCREEN URINE: NOT DETECTED
BENZODIAZEPINE SCREEN, URINE: POSITIVE
CANNABINOID SCREEN URINE: NOT DETECTED
COCAINE METABOLITE SCREEN URINE: NOT DETECTED
METHADONE SCREEN, URINE: NOT DETECTED
OPIATE SCREEN URINE: NOT DETECTED
PHENCYCLIDINE SCREEN URINE: NOT DETECTED
PROPOXYPHENE SCREEN: NOT DETECTED

## 2019-02-04 PROCEDURE — 80307 DRUG TEST PRSMV CHEM ANLYZR: CPT

## 2019-02-04 PROCEDURE — G0480 DRUG TEST DEF 1-7 CLASSES: HCPCS

## 2019-02-04 PROCEDURE — 99214 OFFICE O/P EST MOD 30 MIN: CPT | Performed by: FAMILY MEDICINE

## 2019-02-04 ASSESSMENT — PATIENT HEALTH QUESTIONNAIRE - PHQ9
2. FEELING DOWN, DEPRESSED OR HOPELESS: 0
SUM OF ALL RESPONSES TO PHQ QUESTIONS 1-9: 0
1. LITTLE INTEREST OR PLEASURE IN DOING THINGS: 0
SUM OF ALL RESPONSES TO PHQ QUESTIONS 1-9: 0
SUM OF ALL RESPONSES TO PHQ9 QUESTIONS 1 & 2: 0

## 2019-02-04 ASSESSMENT — ENCOUNTER SYMPTOMS
BACK PAIN: 0
ABDOMINAL PAIN: 0
SHORTNESS OF BREATH: 0
SINUS PRESSURE: 0
SORE THROAT: 0
DIARRHEA: 0
EYE PAIN: 0
COUGH: 0
CONSTIPATION: 0

## 2019-02-09 LAB
7-AMINOCLONAZEPAM, URINE: <5 NG/ML
ALPHA-HYDROXYALPRAZOLAM, URINE: 299 NG/ML
ALPHA-HYDROXYMIDAZOLAM, URINE: <20 NG/ML
ALPRAZOLAM, URINE: 264 NG/ML
CHLORDIAZEPOXIDE, URINE: <20 NG/ML
CLONAZEPAM, URINE: <5 NG/ML
DIAZEPAM, URINE: <20 NG/ML
LORAZEPAM, URINE: <20 NG/ML
MIDAZOLAM, URINE: <20 NG/ML
NORDIAZEPAM, URINE: <20 NG/ML
OXAZEPAM, URINE: <20 NG/ML
TEMAZEPAM, URINE: <20 NG/ML

## 2019-03-11 ENCOUNTER — OFFICE VISIT (OUTPATIENT)
Dept: FAMILY MEDICINE CLINIC | Age: 84
End: 2019-03-11
Payer: MEDICARE

## 2019-03-11 VITALS
RESPIRATION RATE: 18 BRPM | SYSTOLIC BLOOD PRESSURE: 118 MMHG | HEART RATE: 81 BPM | BODY MASS INDEX: 29.88 KG/M2 | WEIGHT: 175 LBS | HEIGHT: 64 IN | DIASTOLIC BLOOD PRESSURE: 80 MMHG | OXYGEN SATURATION: 99 %

## 2019-03-11 DIAGNOSIS — J01.90 ACUTE BACTERIAL SINUSITIS: ICD-10-CM

## 2019-03-11 DIAGNOSIS — I35.0 NONRHEUMATIC AORTIC VALVE STENOSIS: Primary | ICD-10-CM

## 2019-03-11 DIAGNOSIS — G47.00 INSOMNIA, UNSPECIFIED TYPE: ICD-10-CM

## 2019-03-11 DIAGNOSIS — E78.2 MIXED HYPERLIPIDEMIA: ICD-10-CM

## 2019-03-11 DIAGNOSIS — B96.89 ACUTE BACTERIAL SINUSITIS: ICD-10-CM

## 2019-03-11 PROCEDURE — 99214 OFFICE O/P EST MOD 30 MIN: CPT | Performed by: FAMILY MEDICINE

## 2019-03-11 RX ORDER — CEFDINIR 300 MG/1
300 CAPSULE ORAL 2 TIMES DAILY
Qty: 14 CAPSULE | Refills: 0 | Status: SHIPPED | OUTPATIENT
Start: 2019-03-11 | End: 2019-03-18

## 2019-03-11 RX ORDER — ROSUVASTATIN CALCIUM 10 MG/1
10 TABLET, COATED ORAL DAILY
Qty: 30 TABLET | Refills: 3 | Status: SHIPPED | OUTPATIENT
Start: 2019-03-11 | End: 2019-05-28 | Stop reason: SDUPTHER

## 2019-03-11 ASSESSMENT — ENCOUNTER SYMPTOMS
EYE PAIN: 0
BACK PAIN: 0
SORE THROAT: 1
DIARRHEA: 0
SHORTNESS OF BREATH: 0
COUGH: 1
CONSTIPATION: 0
SINUS PRESSURE: 0
ABDOMINAL PAIN: 0

## 2019-03-19 ENCOUNTER — OFFICE VISIT (OUTPATIENT)
Dept: FAMILY MEDICINE CLINIC | Age: 84
End: 2019-03-19
Payer: MEDICARE

## 2019-03-19 VITALS
HEIGHT: 61 IN | RESPIRATION RATE: 20 BRPM | WEIGHT: 176 LBS | BODY MASS INDEX: 33.23 KG/M2 | SYSTOLIC BLOOD PRESSURE: 122 MMHG | OXYGEN SATURATION: 98 % | DIASTOLIC BLOOD PRESSURE: 64 MMHG | HEART RATE: 71 BPM

## 2019-03-19 DIAGNOSIS — F51.01 PRIMARY INSOMNIA: ICD-10-CM

## 2019-03-19 DIAGNOSIS — F41.1 GENERALIZED ANXIETY DISORDER: ICD-10-CM

## 2019-03-19 DIAGNOSIS — H10.13 ALLERGIC CONJUNCTIVITIS OF BOTH EYES: Primary | ICD-10-CM

## 2019-03-19 PROCEDURE — 99213 OFFICE O/P EST LOW 20 MIN: CPT | Performed by: PHYSICIAN ASSISTANT

## 2019-03-19 RX ORDER — LORATADINE 10 MG/1
10 TABLET ORAL DAILY
Qty: 15 TABLET | Refills: 0 | Status: SHIPPED | OUTPATIENT
Start: 2019-03-19 | End: 2019-04-18

## 2019-03-19 RX ORDER — ALPRAZOLAM 0.5 MG/1
0.5 TABLET ORAL NIGHTLY PRN
Qty: 90 TABLET | Refills: 0 | Status: SHIPPED | OUTPATIENT
Start: 2019-03-19 | End: 2019-05-28 | Stop reason: SDUPTHER

## 2019-03-19 RX ORDER — ALPRAZOLAM 0.5 MG/1
0.5 TABLET ORAL NIGHTLY PRN
COMMUNITY
End: 2019-03-19 | Stop reason: SDUPTHER

## 2019-03-19 RX ORDER — OLOPATADINE HYDROCHLORIDE 1 MG/ML
1 SOLUTION/ DROPS OPHTHALMIC 2 TIMES DAILY
Qty: 5 ML | Refills: 0 | Status: SHIPPED | OUTPATIENT
Start: 2019-03-19 | End: 2019-04-18

## 2019-03-26 ENCOUNTER — TELEPHONE (OUTPATIENT)
Dept: FAMILY MEDICINE CLINIC | Age: 84
End: 2019-03-26

## 2019-03-26 RX ORDER — AZELASTINE HYDROCHLORIDE 0.5 MG/ML
1 SOLUTION/ DROPS OPHTHALMIC 2 TIMES DAILY
Qty: 6 ML | Refills: 1 | Status: SHIPPED | OUTPATIENT
Start: 2019-03-26 | End: 2019-04-25

## 2019-05-08 RX ORDER — LOSARTAN POTASSIUM 50 MG/1
50 TABLET ORAL DAILY
Qty: 90 TABLET | Refills: 0 | Status: SHIPPED | OUTPATIENT
Start: 2019-05-08 | End: 2019-05-28 | Stop reason: SDUPTHER

## 2019-05-28 ENCOUNTER — OFFICE VISIT (OUTPATIENT)
Dept: PRIMARY CARE CLINIC | Age: 84
End: 2019-05-28
Payer: MEDICARE

## 2019-05-28 ENCOUNTER — HOSPITAL ENCOUNTER (OUTPATIENT)
Age: 84
Discharge: HOME | End: 2019-05-30
Payer: MEDICARE

## 2019-05-28 VITALS
TEMPERATURE: 97.7 F | BODY MASS INDEX: 31.93 KG/M2 | HEART RATE: 60 BPM | OXYGEN SATURATION: 97 % | SYSTOLIC BLOOD PRESSURE: 108 MMHG | HEIGHT: 61 IN | RESPIRATION RATE: 18 BRPM | WEIGHT: 169.12 LBS | DIASTOLIC BLOOD PRESSURE: 72 MMHG

## 2019-05-28 DIAGNOSIS — I10 ESSENTIAL HYPERTENSION: ICD-10-CM

## 2019-05-28 DIAGNOSIS — I10 ESSENTIAL HYPERTENSION: Primary | ICD-10-CM

## 2019-05-28 DIAGNOSIS — E78.2 MIXED HYPERLIPIDEMIA: ICD-10-CM

## 2019-05-28 DIAGNOSIS — Z23 NEED FOR PROPHYLACTIC VACCINATION AGAINST STREPTOCOCCUS PNEUMONIAE (PNEUMOCOCCUS): ICD-10-CM

## 2019-05-28 DIAGNOSIS — L30.9 DERMATITIS: ICD-10-CM

## 2019-05-28 DIAGNOSIS — F41.1 GENERALIZED ANXIETY DISORDER: ICD-10-CM

## 2019-05-28 DIAGNOSIS — F17.200 SMOKER: ICD-10-CM

## 2019-05-28 DIAGNOSIS — I35.0 NONRHEUMATIC AORTIC VALVE STENOSIS: ICD-10-CM

## 2019-05-28 DIAGNOSIS — F51.01 PRIMARY INSOMNIA: ICD-10-CM

## 2019-05-28 LAB
ALBUMIN SERPL-MCNC: 4.2 G/DL (ref 3.5–5.2)
ALP BLD-CCNC: 105 U/L (ref 35–104)
ALT SERPL-CCNC: 17 U/L (ref 0–32)
ANION GAP SERPL CALCULATED.3IONS-SCNC: 16 MMOL/L (ref 7–16)
AST SERPL-CCNC: 21 U/L (ref 0–31)
BASOPHILS ABSOLUTE: 0.05 E9/L (ref 0–0.2)
BASOPHILS RELATIVE PERCENT: 0.6 % (ref 0–2)
BILIRUB SERPL-MCNC: 0.4 MG/DL (ref 0–1.2)
BUN BLDV-MCNC: 21 MG/DL (ref 8–23)
CALCIUM SERPL-MCNC: 9.1 MG/DL (ref 8.6–10.2)
CHLORIDE BLD-SCNC: 105 MMOL/L (ref 98–107)
CO2: 20 MMOL/L (ref 22–29)
CREAT SERPL-MCNC: 1.1 MG/DL (ref 0.5–1)
EOSINOPHILS ABSOLUTE: 0 E9/L (ref 0.05–0.5)
EOSINOPHILS RELATIVE PERCENT: 0 % (ref 0–6)
GFR AFRICAN AMERICAN: 57
GFR NON-AFRICAN AMERICAN: 47 ML/MIN/1.73
GLUCOSE BLD-MCNC: 97 MG/DL (ref 74–99)
HCT VFR BLD CALC: 39.4 % (ref 34–48)
HEMOGLOBIN: 12.9 G/DL (ref 11.5–15.5)
IMMATURE GRANULOCYTES #: 0.02 E9/L
IMMATURE GRANULOCYTES %: 0.2 % (ref 0–5)
LYMPHOCYTES ABSOLUTE: 2.91 E9/L (ref 1.5–4)
LYMPHOCYTES RELATIVE PERCENT: 33.6 % (ref 20–42)
MCH RBC QN AUTO: 31.7 PG (ref 26–35)
MCHC RBC AUTO-ENTMCNC: 32.7 % (ref 32–34.5)
MCV RBC AUTO: 96.8 FL (ref 80–99.9)
MONOCYTES ABSOLUTE: 0.63 E9/L (ref 0.1–0.95)
MONOCYTES RELATIVE PERCENT: 7.3 % (ref 2–12)
NEUTROPHILS ABSOLUTE: 5.05 E9/L (ref 1.8–7.3)
NEUTROPHILS RELATIVE PERCENT: 58.3 % (ref 43–80)
PDW BLD-RTO: 12.3 FL (ref 11.5–15)
PLATELET # BLD: 230 E9/L (ref 130–450)
PMV BLD AUTO: 13.8 FL (ref 7–12)
POTASSIUM SERPL-SCNC: 4.5 MMOL/L (ref 3.5–5)
RBC # BLD: 4.07 E12/L (ref 3.5–5.5)
SODIUM BLD-SCNC: 141 MMOL/L (ref 132–146)
TOTAL PROTEIN: 7.1 G/DL (ref 6.4–8.3)
TSH SERPL DL<=0.05 MIU/L-ACNC: 2.81 UIU/ML (ref 0.27–4.2)
WBC # BLD: 8.7 E9/L (ref 4.5–11.5)

## 2019-05-28 PROCEDURE — 80053 COMPREHEN METABOLIC PANEL: CPT

## 2019-05-28 PROCEDURE — G0009 ADMIN PNEUMOCOCCAL VACCINE: HCPCS | Performed by: FAMILY MEDICINE

## 2019-05-28 PROCEDURE — 84443 ASSAY THYROID STIM HORMONE: CPT

## 2019-05-28 PROCEDURE — 90670 PCV13 VACCINE IM: CPT | Performed by: FAMILY MEDICINE

## 2019-05-28 PROCEDURE — 85025 COMPLETE CBC W/AUTO DIFF WBC: CPT

## 2019-05-28 PROCEDURE — 99214 OFFICE O/P EST MOD 30 MIN: CPT | Performed by: FAMILY MEDICINE

## 2019-05-28 RX ORDER — BETAMETHASONE DIPROPIONATE 0.05 %
OINTMENT (GRAM) TOPICAL
Refills: 1 | COMMUNITY
Start: 2019-04-02 | End: 2022-03-01 | Stop reason: ALTCHOICE

## 2019-05-28 RX ORDER — ALPRAZOLAM 0.5 MG/1
0.5 TABLET ORAL NIGHTLY PRN
Qty: 30 TABLET | Refills: 2 | Status: SHIPPED | OUTPATIENT
Start: 2019-05-28 | End: 2019-08-26

## 2019-05-28 RX ORDER — ROSUVASTATIN CALCIUM 10 MG/1
10 TABLET, COATED ORAL DAILY
Qty: 90 TABLET | Refills: 1 | Status: SHIPPED | OUTPATIENT
Start: 2019-05-28 | End: 2019-12-10 | Stop reason: SDUPTHER

## 2019-05-28 RX ORDER — CITALOPRAM 10 MG/1
10 TABLET ORAL DAILY
Qty: 30 TABLET | Refills: 5 | Status: SHIPPED | OUTPATIENT
Start: 2019-05-28 | End: 2019-08-27

## 2019-05-28 RX ORDER — LOSARTAN POTASSIUM 50 MG/1
50 TABLET ORAL DAILY
Qty: 90 TABLET | Refills: 1 | Status: SHIPPED
Start: 2019-05-28 | End: 2020-02-10

## 2019-05-28 NOTE — PATIENT INSTRUCTIONS
Patient Education        Learning About Sleeping Well  What does sleeping well mean? Sleeping well means getting enough sleep. How much sleep is enough varies among people. The number of hours you sleep is not as important as how you feel when you wake up. If you do not feel refreshed, you probably need more sleep. Another sign of not getting enough sleep is feeling tired during the day. The average total nightly sleep time is 7½ to 8 hours. Healthy adults may need a little more or a little less than this. Why is getting enough sleep important? Getting enough quality sleep is a basic part of good health. When your sleep suffers, your mood and your thoughts can suffer too. You may find yourself feeling more grumpy or stressed. Not getting enough sleep also can lead to serious problems, including injury, accidents, anxiety, and depression. What might cause poor sleeping? Many things can cause sleep problems, including:  · Stress. Stress can be caused by fear about a single event, such as giving a speech. Or you may have ongoing stress, such as worry about work or school. · Depression, anxiety, and other mental or emotional conditions. · Changes in your sleep habits or surroundings. This includes changes that happen where you sleep, such as noise, light, or sleeping in a different bed. It also includes changes in your sleep pattern, such as having jet lag or working a late shift. · Health problems, such as pain, breathing problems, and restless legs syndrome. · Lack of regular exercise. How can you help yourself? Here are some tips that may help you sleep more soundly and wake up feeling more refreshed. Your sleeping area  · Use your bedroom only for sleeping and sex. A bit of light reading may help you fall asleep. But if it doesn't, do your reading elsewhere in the house. Don't watch TV in bed.   · Be sure your bed is big enough to stretch out comfortably, especially if you have a sleep partner. · Keep your bedroom quiet, dark, and cool. Use curtains, blinds, or a sleep mask to block out light. To block out noise, use earplugs, soothing music, or a \"white noise\" machine. Your evening and bedtime routine  · Create a relaxing bedtime routine. You might want to take a warm shower or bath, listen to soothing music, or drink a cup of noncaffeinated tea. · Go to bed at the same time every night. And get up at the same time every morning, even if you feel tired. What to avoid  · Limit caffeine (coffee, tea, caffeinated sodas) during the day, and don't have any for at least 4 to 6 hours before bedtime. · Don't drink alcohol before bedtime. Alcohol can cause you to wake up more often during the night. · Don't smoke or use tobacco, especially in the evening. Nicotine can keep you awake. · Don't take naps during the day, especially close to bedtime. · Don't lie in bed awake for too long. If you can't fall asleep, or if you wake up in the middle of the night and can't get back to sleep within 15 minutes or so, get out of bed and go to another room until you feel sleepy. · Don't take medicine right before bed that may keep you awake or make you feel hyper or energized. Your doctor can tell you if your medicine may do this and if you can take it earlier in the day. If you can't sleep  · Imagine yourself in a peaceful, pleasant scene. Focus on the details and feelings of being in a place that is relaxing. · Get up and do a quiet or boring activity until you feel sleepy. · Don't drink any liquids after 6 p.m. if you wake up often because you have to go to the bathroom. Where can you learn more? Go to https://[employer]Whim[employer]box. org and sign in to your [employer] account. Enter I225 in the [employer] box to learn more about \"Learning About Sleeping Well. \"     If you do not have an account, please click on the \"Sign Up Now\" link.   Current as of: September 11, 2018  Content Version: 12.0  © 8564-5798 Healthwise, Incorporated. Care instructions adapted under license by [facility] ([facility]). If you have questions about a medical condition or this instruction, always ask your healthcare professional. [address] any warranty or liability for your use of this information.

## 2019-05-28 NOTE — PROGRESS NOTES
Subjective:  81 y.o. y/o female is here for follow up chronic hypertension and to reestablish care within this office (did go to [facility] for a few visits while there wasn't a physician in this office). This is  generally controlled on current medication regimen. Takes meds as directed and tolerates them well. Most recent labs reviewed with patient and are not remarkable--due for some repeat. No symptoms from htn standpoint per ROS. Patient is somewhat compliant with lifestyle modifications. Patient does smoke. Comorbid conditions include below. Cardiology: Dr. Gregoria Henderson, moderate aortic stenosis, seen 10/17, doesn't want to go back  Pulmonology: Dr. Sara Lew, last seen couple years ago, no inhalers  Anxiety/insomnia: Xanax 0.5mg qd, trying melatonin and helping some but still trouble with sleeping, no other medication previously for anxiety or sleep, taking xanax for many years  Hyperlipidemia: crestor 10mg, taking and tolerating  Derm: Dr. Isabel Oshea regularly  Required wound care previously  Declines DEXA  Prevnar 15 today  Continues to smoke  Walks the [employer]  Lives with daughter    Controlled Substances Monitoring:     RX Monitoring 5/28/2019   Attestation The Prescription Monitoring Report for this patient was reviewed today. Chronic Pain Routine Monitoring No signs of potential drug abuse or diversion identified: otherwise, see note documentation;Possible medication side effects, risk of tolerance/dependence & alternative treatments discussed. Chronic Pain > 80 MEDD -       Patient's past medical, surgical, social and/or family history reviewed, updated in chart, and are non-contributory (unless otherwise stated). Medications and allergies also reviewed and updated in chart.         Review of Systems:  Constitutional:  No fever, no fatigue, no chills, no headaches, no weight change  Dermatology:  No rash, no mole, + dry or sensitive skin  ENT:  No cough, no sore throat, no sinus pain, no runny nose, no cardiology at this time. Mixed hyperlipidemia, at goal  -     rosuvastatin (CRESTOR) 10 MG tablet; Take 1 tablet by mouth daily  + Continue current medication(s) along with dietary and lifestyle modifications. Generalized anxiety disorder, uncontrolled  -     ALPRAZolam (XANAX) 0.5 MG tablet; Take 1 tablet by mouth nightly as needed for Sleep for up to 90 days. -     citalopram (CELEXA) 10 MG tablet; Take 1 tablet by mouth daily  - Trial of SSRI (Celexa). Switch the chronic benzo to nightly to help with sleep. Need for prophylactic vaccination against Streptococcus pneumoniae (pneumococcus)  -     Pneumococcal conjugate vaccine 13-valent PCV13    Primary insomnia  Good sleep hygiene, hand--out given. Switch to the xanax at night and start SSRI. Smoker  Cessation encouraged. Dermatitis  Continue with dermatology as directed. Above lab to be drawn today. As above. Call or go to ED immediately if symptoms worsen or persist.  Return in about 3 months (around 8/28/2019) for anxiety. , or sooner if necessary. Plan for repeat lab in 6 months. Educational materials and/or home exercises printed for patient's review and were included in patient instructions on his/her After Visit Summary and given to patient at the end of visit. Counseled regarding above diagnosis, including possible risks and complications,  especially if left uncontrolled. Counseled regarding the possible side effects, risks, benefits and alternatives to treatment; patient and/or guardian verbalizes understanding, agrees, feels comfortable with and wishes to proceed with above treatment plan. Advised patient to call with any new medication issues, and read all Rx info from pharmacy to assure aware of all possible risks and side effects of medication before taking. Reviewed age and gender appropriate health screening exams and vaccinations.   Advised patient regarding importance of keeping up with recommended health maintenance and to schedule as soon as possible if overdue, as this is important in assessing for undiagnosed pathology, especially cancer, as well as protecting against potentially harmful/life threatening disease. Patient and/or guardian verbalizes understanding and agrees with above counseling, assessment and plan. All questions answered.

## 2019-08-27 ENCOUNTER — OFFICE VISIT (OUTPATIENT)
Dept: PRIMARY CARE CLINIC | Age: 84
End: 2019-08-27
Payer: MEDICARE

## 2019-08-27 VITALS
HEIGHT: 59 IN | WEIGHT: 172.8 LBS | DIASTOLIC BLOOD PRESSURE: 72 MMHG | BODY MASS INDEX: 34.84 KG/M2 | RESPIRATION RATE: 16 BRPM | TEMPERATURE: 97.8 F | SYSTOLIC BLOOD PRESSURE: 124 MMHG | OXYGEN SATURATION: 97 % | HEART RATE: 68 BPM

## 2019-08-27 DIAGNOSIS — N18.30 CKD (CHRONIC KIDNEY DISEASE) STAGE 3, GFR 30-59 ML/MIN (HCC): ICD-10-CM

## 2019-08-27 DIAGNOSIS — E78.2 MIXED HYPERLIPIDEMIA: Primary | ICD-10-CM

## 2019-08-27 DIAGNOSIS — F51.01 PRIMARY INSOMNIA: Primary | ICD-10-CM

## 2019-08-27 DIAGNOSIS — F41.1 GENERALIZED ANXIETY DISORDER: ICD-10-CM

## 2019-08-27 DIAGNOSIS — I10 ESSENTIAL HYPERTENSION: ICD-10-CM

## 2019-08-27 PROCEDURE — 99213 OFFICE O/P EST LOW 20 MIN: CPT | Performed by: FAMILY MEDICINE

## 2019-08-27 RX ORDER — ALPRAZOLAM 0.5 MG/1
0.5 TABLET ORAL NIGHTLY PRN
Qty: 30 TABLET | Refills: 2 | Status: SHIPPED | OUTPATIENT
Start: 2019-09-18 | End: 2019-09-30 | Stop reason: SDUPTHER

## 2019-08-27 RX ORDER — HYDROXYZINE HYDROCHLORIDE 10 MG/1
10 TABLET, FILM COATED ORAL 3 TIMES DAILY PRN
COMMUNITY
End: 2021-01-11 | Stop reason: SDUPTHER

## 2019-08-27 RX ORDER — ALPRAZOLAM 0.5 MG/1
0.5 TABLET ORAL NIGHTLY PRN
COMMUNITY
End: 2019-08-27 | Stop reason: SDUPTHER

## 2019-09-30 ENCOUNTER — OFFICE VISIT (OUTPATIENT)
Dept: PRIMARY CARE CLINIC | Age: 84
End: 2019-09-30
Payer: MEDICARE

## 2019-09-30 VITALS
HEIGHT: 59 IN | SYSTOLIC BLOOD PRESSURE: 122 MMHG | HEART RATE: 65 BPM | BODY MASS INDEX: 34.47 KG/M2 | TEMPERATURE: 97.8 F | DIASTOLIC BLOOD PRESSURE: 60 MMHG | WEIGHT: 171 LBS | OXYGEN SATURATION: 97 % | RESPIRATION RATE: 18 BRPM

## 2019-09-30 DIAGNOSIS — Z23 NEED FOR INFLUENZA VACCINATION: ICD-10-CM

## 2019-09-30 DIAGNOSIS — F41.1 GENERALIZED ANXIETY DISORDER: ICD-10-CM

## 2019-09-30 DIAGNOSIS — F51.01 PRIMARY INSOMNIA: Primary | ICD-10-CM

## 2019-09-30 PROCEDURE — 90653 IIV ADJUVANT VACCINE IM: CPT | Performed by: FAMILY MEDICINE

## 2019-09-30 PROCEDURE — 99213 OFFICE O/P EST LOW 20 MIN: CPT | Performed by: FAMILY MEDICINE

## 2019-09-30 PROCEDURE — G0008 ADMIN INFLUENZA VIRUS VAC: HCPCS | Performed by: FAMILY MEDICINE

## 2019-09-30 RX ORDER — ALPRAZOLAM 0.5 MG/1
0.5 TABLET ORAL NIGHTLY PRN
Qty: 30 TABLET | Refills: 2 | Status: SHIPPED | OUTPATIENT
Start: 2019-09-30 | End: 2019-12-02 | Stop reason: SDUPTHER

## 2019-11-26 ENCOUNTER — HOSPITAL ENCOUNTER (OUTPATIENT)
Age: 84
Discharge: HOME | End: 2019-11-28
Payer: MEDICARE

## 2019-11-26 DIAGNOSIS — E78.2 MIXED HYPERLIPIDEMIA: ICD-10-CM

## 2019-11-26 DIAGNOSIS — N18.30 CKD (CHRONIC KIDNEY DISEASE) STAGE 3, GFR 30-59 ML/MIN (HCC): ICD-10-CM

## 2019-11-26 DIAGNOSIS — I10 ESSENTIAL HYPERTENSION: ICD-10-CM

## 2019-11-26 LAB
ALBUMIN SERPL-MCNC: 4.3 G/DL (ref 3.5–5.2)
ALP BLD-CCNC: 91 U/L (ref 35–104)
ALT SERPL-CCNC: 18 U/L (ref 0–32)
ANION GAP SERPL CALCULATED.3IONS-SCNC: 17 MMOL/L (ref 7–16)
AST SERPL-CCNC: 24 U/L (ref 0–31)
BASOPHILS ABSOLUTE: 0.06 E9/L (ref 0–0.2)
BASOPHILS RELATIVE PERCENT: 0.7 % (ref 0–2)
BILIRUB SERPL-MCNC: 0.4 MG/DL (ref 0–1.2)
BUN BLDV-MCNC: 24 MG/DL (ref 8–23)
CALCIUM SERPL-MCNC: 8.7 MG/DL (ref 8.6–10.2)
CHLORIDE BLD-SCNC: 106 MMOL/L (ref 98–107)
CHOLESTEROL, TOTAL: 103 MG/DL (ref 0–199)
CO2: 20 MMOL/L (ref 22–29)
CREAT SERPL-MCNC: 1.2 MG/DL (ref 0.5–1)
CREATININE URINE: 140 MG/DL (ref 29–226)
EOSINOPHILS ABSOLUTE: 0 E9/L (ref 0.05–0.5)
EOSINOPHILS RELATIVE PERCENT: 0 % (ref 0–6)
GFR AFRICAN AMERICAN: 52
GFR NON-AFRICAN AMERICAN: 43 ML/MIN/1.73
GLUCOSE BLD-MCNC: 90 MG/DL (ref 74–99)
HCT VFR BLD CALC: 40.5 % (ref 34–48)
HDLC SERPL-MCNC: 47 MG/DL
HEMOGLOBIN: 13 G/DL (ref 11.5–15.5)
IMMATURE GRANULOCYTES #: 0.02 E9/L
IMMATURE GRANULOCYTES %: 0.2 % (ref 0–5)
LDL CHOLESTEROL CALCULATED: 38 MG/DL (ref 0–99)
LYMPHOCYTES ABSOLUTE: 2.37 E9/L (ref 1.5–4)
LYMPHOCYTES RELATIVE PERCENT: 28.9 % (ref 20–42)
MAGNESIUM: 2.1 MG/DL (ref 1.6–2.6)
MCH RBC QN AUTO: 31.9 PG (ref 26–35)
MCHC RBC AUTO-ENTMCNC: 32.1 % (ref 32–34.5)
MCV RBC AUTO: 99.5 FL (ref 80–99.9)
MICROALBUMIN UR-MCNC: 22.4 MG/L
MICROALBUMIN/CREAT UR-RTO: 16 (ref 0–30)
MONOCYTES ABSOLUTE: 0.65 E9/L (ref 0.1–0.95)
MONOCYTES RELATIVE PERCENT: 7.9 % (ref 2–12)
NEUTROPHILS ABSOLUTE: 5.09 E9/L (ref 1.8–7.3)
NEUTROPHILS RELATIVE PERCENT: 62.3 % (ref 43–80)
PDW BLD-RTO: 12.4 FL (ref 11.5–15)
PHOSPHORUS: 4.1 MG/DL (ref 2.5–4.5)
PLATELET # BLD: 248 E9/L (ref 130–450)
PMV BLD AUTO: 13.4 FL (ref 7–12)
POTASSIUM SERPL-SCNC: 4.4 MMOL/L (ref 3.5–5)
RBC # BLD: 4.07 E12/L (ref 3.5–5.5)
SODIUM BLD-SCNC: 143 MMOL/L (ref 132–146)
TOTAL PROTEIN: 7.2 G/DL (ref 6.4–8.3)
TRIGL SERPL-MCNC: 91 MG/DL (ref 0–149)
TSH SERPL DL<=0.05 MIU/L-ACNC: 2.5 UIU/ML (ref 0.27–4.2)
VITAMIN D 25-HYDROXY: 36 NG/ML (ref 30–100)
VLDLC SERPL CALC-MCNC: 18 MG/DL
WBC # BLD: 8.2 E9/L (ref 4.5–11.5)

## 2019-11-26 PROCEDURE — 84100 ASSAY OF PHOSPHORUS: CPT

## 2019-11-26 PROCEDURE — 83735 ASSAY OF MAGNESIUM: CPT

## 2019-11-26 PROCEDURE — 80061 LIPID PANEL: CPT

## 2019-11-26 PROCEDURE — 85025 COMPLETE CBC W/AUTO DIFF WBC: CPT

## 2019-11-26 PROCEDURE — 80053 COMPREHEN METABOLIC PANEL: CPT

## 2019-11-26 PROCEDURE — 82306 VITAMIN D 25 HYDROXY: CPT

## 2019-11-26 PROCEDURE — 84443 ASSAY THYROID STIM HORMONE: CPT

## 2019-11-26 PROCEDURE — 82044 UR ALBUMIN SEMIQUANTITATIVE: CPT

## 2019-11-26 PROCEDURE — 82570 ASSAY OF URINE CREATININE: CPT

## 2019-12-02 ENCOUNTER — OFFICE VISIT (OUTPATIENT)
Dept: PRIMARY CARE CLINIC | Age: 84
End: 2019-12-02
Payer: MEDICARE

## 2019-12-02 VITALS
DIASTOLIC BLOOD PRESSURE: 72 MMHG | RESPIRATION RATE: 16 BRPM | SYSTOLIC BLOOD PRESSURE: 132 MMHG | HEIGHT: 59 IN | BODY MASS INDEX: 34.27 KG/M2 | WEIGHT: 170 LBS | TEMPERATURE: 98.1 F | HEART RATE: 63 BPM | OXYGEN SATURATION: 96 %

## 2019-12-02 DIAGNOSIS — F51.01 PRIMARY INSOMNIA: ICD-10-CM

## 2019-12-02 DIAGNOSIS — I10 ESSENTIAL HYPERTENSION: Primary | ICD-10-CM

## 2019-12-02 DIAGNOSIS — E78.2 MIXED HYPERLIPIDEMIA: ICD-10-CM

## 2019-12-02 DIAGNOSIS — J43.0 UNILATERAL EMPHYSEMA (HCC): ICD-10-CM

## 2019-12-02 DIAGNOSIS — N18.30 CKD (CHRONIC KIDNEY DISEASE) STAGE 3, GFR 30-59 ML/MIN (HCC): ICD-10-CM

## 2019-12-02 DIAGNOSIS — F41.1 GENERALIZED ANXIETY DISORDER: ICD-10-CM

## 2019-12-02 DIAGNOSIS — I35.0 NONRHEUMATIC AORTIC VALVE STENOSIS: ICD-10-CM

## 2019-12-02 PROCEDURE — 99214 OFFICE O/P EST MOD 30 MIN: CPT | Performed by: FAMILY MEDICINE

## 2019-12-02 RX ORDER — ALPRAZOLAM 0.5 MG/1
0.5 TABLET ORAL NIGHTLY PRN
Qty: 30 TABLET | Refills: 2 | Status: SHIPPED
Start: 2019-12-02 | End: 2020-04-06

## 2019-12-02 RX ORDER — AZELASTINE HYDROCHLORIDE 0.5 MG/ML
SOLUTION/ DROPS OPHTHALMIC
Refills: 1 | COMMUNITY
Start: 2019-11-05 | End: 2020-03-27 | Stop reason: SDUPTHER

## 2019-12-10 DIAGNOSIS — E78.2 MIXED HYPERLIPIDEMIA: ICD-10-CM

## 2019-12-10 RX ORDER — ROSUVASTATIN CALCIUM 10 MG/1
10 TABLET, COATED ORAL DAILY
Qty: 90 TABLET | Refills: 1 | Status: SHIPPED
Start: 2019-12-10 | End: 2020-03-12 | Stop reason: SDUPTHER

## 2020-03-12 RX ORDER — ROSUVASTATIN CALCIUM 10 MG/1
10 TABLET, COATED ORAL DAILY
Qty: 90 TABLET | Refills: 1 | Status: SHIPPED
Start: 2020-03-12 | End: 2020-06-08 | Stop reason: SDUPTHER

## 2020-03-27 RX ORDER — AZELASTINE HYDROCHLORIDE 0.5 MG/ML
1 SOLUTION/ DROPS OPHTHALMIC 2 TIMES DAILY
Qty: 1 BOTTLE | Refills: 1 | Status: SHIPPED
Start: 2020-03-27 | End: 2021-01-11 | Stop reason: SDUPTHER

## 2020-03-27 NOTE — TELEPHONE ENCOUNTER
Last Appointment:  12/2/2019  Future Appointments   Date Time Provider Sari Tenorio   6/2/2020 10:00 AM Yeni Farrar [facility]   6/8/2020 11:00 AM MD Milena Patiño [facility]on[facility]    Patient left vm requesting refill on Azelastine

## 2020-04-06 RX ORDER — ALPRAZOLAM 0.5 MG/1
TABLET ORAL
Qty: 30 TABLET | Refills: 0 | Status: SHIPPED
Start: 2020-04-06 | End: 2020-05-07 | Stop reason: SDUPTHER

## 2020-05-07 RX ORDER — ALPRAZOLAM 0.5 MG/1
TABLET ORAL
Qty: 30 TABLET | Refills: 0 | Status: SHIPPED | OUTPATIENT
Start: 2020-05-07 | End: 2020-06-07

## 2020-05-07 RX ORDER — LOSARTAN POTASSIUM 50 MG/1
TABLET ORAL
Qty: 90 TABLET | Refills: 1 | Status: SHIPPED
Start: 2020-05-07 | End: 2020-11-17 | Stop reason: SDUPTHER

## 2020-05-07 NOTE — TELEPHONE ENCOUNTER
Requested Prescriptions     Pending Prescriptions Disp Refills    losartan (COZAAR) 50 MG tablet 90 tablet 1     Sig: TAKE ONE TABLET BY MOUTH EVERY DAY    ALPRAZolam (XANAX) 0.5 MG tablet 30 tablet 0     Sig: TAKE ONE TABLET BY MOUTH EVERY NIGHT AS NEEDED FOR SLEEP OR ANXIETY

## 2020-06-02 ENCOUNTER — HOSPITAL ENCOUNTER (OUTPATIENT)
Age: 85
Discharge: HOME | End: 2020-06-02
Payer: MEDICARE

## 2020-06-02 ENCOUNTER — HOSPITAL ENCOUNTER (OUTPATIENT)
Age: 85
Discharge: HOME | End: 2020-06-04
Payer: MEDICARE

## 2020-06-02 LAB
ALBUMIN SERPL-MCNC: 4.6 G/DL (ref 3.5–5.2)
ALP BLD-CCNC: 101 U/L (ref 35–104)
ALT SERPL-CCNC: 23 U/L (ref 0–32)
ANION GAP SERPL CALCULATED.3IONS-SCNC: 13 MMOL/L (ref 7–16)
AST SERPL-CCNC: 28 U/L (ref 0–31)
BASOPHILS ABSOLUTE: 0.04 E9/L (ref 0–0.2)
BASOPHILS RELATIVE PERCENT: 0.4 % (ref 0–2)
BILIRUB SERPL-MCNC: 0.5 MG/DL (ref 0–1.2)
BUN BLDV-MCNC: 20 MG/DL (ref 8–23)
CALCIUM SERPL-MCNC: 9.3 MG/DL (ref 8.6–10.2)
CHLORIDE BLD-SCNC: 105 MMOL/L (ref 98–107)
CHOLESTEROL, FASTING: 117 MG/DL (ref 0–199)
CO2: 23 MMOL/L (ref 22–29)
CREAT SERPL-MCNC: 1.3 MG/DL (ref 0.5–1)
CREATININE URINE: 117 MG/DL (ref 29–226)
EOSINOPHILS ABSOLUTE: 0 E9/L (ref 0.05–0.5)
EOSINOPHILS RELATIVE PERCENT: 0 % (ref 0–6)
GFR AFRICAN AMERICAN: 47
GFR NON-AFRICAN AMERICAN: 39 ML/MIN/1.73
GLUCOSE BLD-MCNC: 91 MG/DL (ref 74–99)
HCT VFR BLD CALC: 42.8 % (ref 34–48)
HDLC SERPL-MCNC: 51 MG/DL
HEMOGLOBIN: 14.4 G/DL (ref 11.5–15.5)
IMMATURE GRANULOCYTES #: 0.02 E9/L
IMMATURE GRANULOCYTES %: 0.2 % (ref 0–5)
LDL CHOLESTEROL CALCULATED: 40 MG/DL (ref 0–99)
LYMPHOCYTES ABSOLUTE: 2.68 E9/L (ref 1.5–4)
LYMPHOCYTES RELATIVE PERCENT: 29.5 % (ref 20–42)
MCH RBC QN AUTO: 33.6 PG (ref 26–35)
MCHC RBC AUTO-ENTMCNC: 33.6 % (ref 32–34.5)
MCV RBC AUTO: 99.8 FL (ref 80–99.9)
MICROALBUMIN UR-MCNC: <12 MG/L
MICROALBUMIN/CREAT UR-RTO: ABNORMAL (ref 0–30)
MONOCYTES ABSOLUTE: 0.64 E9/L (ref 0.1–0.95)
MONOCYTES RELATIVE PERCENT: 7 % (ref 2–12)
NEUTROPHILS ABSOLUTE: 5.7 E9/L (ref 1.8–7.3)
NEUTROPHILS RELATIVE PERCENT: 62.9 % (ref 43–80)
PDW BLD-RTO: 12.5 FL (ref 11.5–15)
PLATELET # BLD: 233 E9/L (ref 130–450)
PMV BLD AUTO: 13 FL (ref 7–12)
POTASSIUM SERPL-SCNC: 4.5 MMOL/L (ref 3.5–5)
RBC # BLD: 4.29 E12/L (ref 3.5–5.5)
SODIUM BLD-SCNC: 141 MMOL/L (ref 132–146)
TOTAL PROTEIN: 8 G/DL (ref 6.4–8.3)
TRIGLYCERIDE, FASTING: 131 MG/DL (ref 0–149)
TSH SERPL DL<=0.05 MIU/L-ACNC: 2.19 UIU/ML (ref 0.27–4.2)
URIC ACID, SERUM: 5.2 MG/DL (ref 2.4–5.7)
VLDLC SERPL CALC-MCNC: 26 MG/DL
WBC # BLD: 9.1 E9/L (ref 4.5–11.5)

## 2020-06-02 PROCEDURE — 80061 LIPID PANEL: CPT

## 2020-06-02 PROCEDURE — 36415 COLL VENOUS BLD VENIPUNCTURE: CPT

## 2020-06-02 PROCEDURE — 85025 COMPLETE CBC W/AUTO DIFF WBC: CPT

## 2020-06-02 PROCEDURE — 82044 UR ALBUMIN SEMIQUANTITATIVE: CPT

## 2020-06-02 PROCEDURE — 80053 COMPREHEN METABOLIC PANEL: CPT

## 2020-06-02 PROCEDURE — 84550 ASSAY OF BLOOD/URIC ACID: CPT

## 2020-06-02 PROCEDURE — 84443 ASSAY THYROID STIM HORMONE: CPT

## 2020-06-02 PROCEDURE — 82570 ASSAY OF URINE CREATININE: CPT

## 2020-06-08 ENCOUNTER — OFFICE VISIT (OUTPATIENT)
Dept: FAMILY MEDICINE CLINIC | Age: 85
End: 2020-06-08
Payer: MEDICARE

## 2020-06-08 VITALS
SYSTOLIC BLOOD PRESSURE: 114 MMHG | HEART RATE: 62 BPM | WEIGHT: 168.8 LBS | DIASTOLIC BLOOD PRESSURE: 52 MMHG | BODY MASS INDEX: 34.03 KG/M2 | RESPIRATION RATE: 16 BRPM | OXYGEN SATURATION: 97 % | HEIGHT: 59 IN | TEMPERATURE: 96.3 F

## 2020-06-08 PROCEDURE — 99214 OFFICE O/P EST MOD 30 MIN: CPT | Performed by: FAMILY MEDICINE

## 2020-06-08 RX ORDER — ROSUVASTATIN CALCIUM 10 MG/1
10 TABLET, COATED ORAL DAILY
Qty: 90 TABLET | Refills: 1 | Status: SHIPPED
Start: 2020-06-08 | End: 2020-12-08 | Stop reason: SDUPTHER

## 2020-06-08 RX ORDER — ALPRAZOLAM 0.5 MG/1
0.5 TABLET ORAL NIGHTLY PRN
Qty: 30 TABLET | Refills: 1 | Status: SHIPPED
Start: 2020-06-08 | End: 2020-08-17

## 2020-06-08 ASSESSMENT — PATIENT HEALTH QUESTIONNAIRE - PHQ9
SUM OF ALL RESPONSES TO PHQ QUESTIONS 1-9: 0
SUM OF ALL RESPONSES TO PHQ9 QUESTIONS 1 & 2: 0
2. FEELING DOWN, DEPRESSED OR HOPELESS: 0
SUM OF ALL RESPONSES TO PHQ QUESTIONS 1-9: 0
1. LITTLE INTEREST OR PLEASURE IN DOING THINGS: 0

## 2020-06-08 NOTE — PROGRESS NOTES
Subjective:  81 y.o. y/o female is here for follow up chronic hypertension. This is generally controlled on current medication regimen. Takes meds as directed and tolerates them well. Most recent labs reviewed with patient and are not remarkable. No symptoms from htn standpoint per ROS. Patient is somewhat compliant with lifestyle modifications. Patient does smoke. Comorbid conditions include below. Cardiology: Dr. Nahed Pond, moderate aortic stenosis, seen 10/17, doesn't want to go back, no symptoms, does not want repeat echo  Pulmonology: Dr. Zbigniew Rousseau, last seen couple years ago, no inhalers, no symptoms, no desire to go back  Anxiety/insomnia: Xanax 0.5mg qd, trying melatonin and helping some but still trouble with sleeping, tried celexa and didn't like it, taking xanax for many years, now taking xanax at night and helping more, staying awake more during the day, no falls   Hyperlipidemia: crestor 10mg, taking and tolerating, LDL 38, HDL 47  CKD3: Stable, vit D normal, does take ibuprofen 400mg most every other day, no PPI  Derm: Dr. 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 Medications and allergies also reviewed and updated in chart.         Review of Systems:  Constitutional:  No fever, no fatigue, no chills, +occ headaches, no weight change  Dermatology:  No rash, no mole, + dry or sensitive skin, +picking at hands (when nervous)  ENT:  No cough, no sore throat, no sinus pain, no runny nose, no ear pain  Cardiology:  No chest pain, no palpitations, no leg edema, no shortness of breath, no PND  Gastroenterology:  No dysphagia, no abdominal pain, no nausea, no vomiting, no constipation, no diarrhea, no heartburn  Musculoskeletal:  No joint pain, no leg cramps, no

## 2020-10-07 ENCOUNTER — NURSE ONLY (OUTPATIENT)
Dept: FAMILY MEDICINE CLINIC | Age: 85
End: 2020-10-07
Payer: MEDICARE

## 2020-10-07 PROCEDURE — G0008 ADMIN INFLUENZA VIRUS VAC: HCPCS | Performed by: FAMILY MEDICINE

## 2020-10-07 PROCEDURE — 90694 VACC AIIV4 NO PRSRV 0.5ML IM: CPT | Performed by: FAMILY MEDICINE

## 2020-10-07 RX ORDER — ALPRAZOLAM 0.5 MG/1
0.5 TABLET ORAL NIGHTLY PRN
Qty: 30 TABLET | Refills: 0 | Status: SHIPPED
Start: 2020-10-07 | End: 2020-11-10 | Stop reason: SDUPTHER

## 2020-11-10 RX ORDER — ALPRAZOLAM 0.5 MG/1
0.5 TABLET ORAL NIGHTLY PRN
Qty: 30 TABLET | Refills: 0 | Status: SHIPPED
Start: 2020-11-10 | End: 2020-12-09 | Stop reason: SDUPTHER

## 2020-11-10 NOTE — TELEPHONE ENCOUNTER
Requested Prescriptions     Pending Prescriptions Disp Refills    ALPRAZolam (XANAX) 0.5 MG tablet 30 tablet 0     Sig: Take 1 tablet by mouth nightly as needed for Sleep or Anxiety for up to 60 days.

## 2020-11-17 RX ORDER — LOSARTAN POTASSIUM 50 MG/1
TABLET ORAL
Qty: 90 TABLET | Refills: 1 | Status: SHIPPED
Start: 2020-11-17 | End: 2021-05-25 | Stop reason: SDUPTHER

## 2020-11-17 NOTE — TELEPHONE ENCOUNTER
Requested Prescriptions     Pending Prescriptions Disp Refills    losartan (COZAAR) 50 MG tablet 90 tablet 1     Sig: TAKE ONE TABLET BY MOUTH EVERY DAY

## 2020-12-08 RX ORDER — ROSUVASTATIN CALCIUM 10 MG/1
10 TABLET, COATED ORAL DAILY
Qty: 90 TABLET | Refills: 1 | Status: SHIPPED
Start: 2020-12-08 | End: 2021-03-08 | Stop reason: SDUPTHER

## 2020-12-08 NOTE — TELEPHONE ENCOUNTER
Requested Prescriptions     Pending Prescriptions Disp Refills    rosuvastatin (CRESTOR) 10 MG tablet 90 tablet 1     Sig: Take 1 tablet by mouth daily

## 2020-12-09 RX ORDER — ALPRAZOLAM 0.5 MG/1
0.5 TABLET ORAL NIGHTLY PRN
Qty: 30 TABLET | Refills: 0 | Status: SHIPPED
Start: 2020-12-09 | End: 2021-01-11 | Stop reason: SDUPTHER

## 2020-12-09 NOTE — TELEPHONE ENCOUNTER
Last Appointment   6/8/2020  Next Appointment  1/11/2021    Patient called for refill on Alprazolam. Med pending drs approval.

## 2021-01-11 ENCOUNTER — OFFICE VISIT (OUTPATIENT)
Dept: FAMILY MEDICINE CLINIC | Age: 86
End: 2021-01-11
Payer: MEDICARE

## 2021-01-11 VITALS
WEIGHT: 160 LBS | RESPIRATION RATE: 16 BRPM | HEIGHT: 59 IN | TEMPERATURE: 97.9 F | HEART RATE: 77 BPM | SYSTOLIC BLOOD PRESSURE: 124 MMHG | BODY MASS INDEX: 32.25 KG/M2 | DIASTOLIC BLOOD PRESSURE: 72 MMHG | OXYGEN SATURATION: 98 %

## 2021-01-11 DIAGNOSIS — L30.9 ECZEMA, UNSPECIFIED TYPE: ICD-10-CM

## 2021-01-11 DIAGNOSIS — I10 ESSENTIAL HYPERTENSION: Primary | ICD-10-CM

## 2021-01-11 DIAGNOSIS — N18.30 CKD (CHRONIC KIDNEY DISEASE) STAGE 3, GFR 30-59 ML/MIN (HCC): ICD-10-CM

## 2021-01-11 DIAGNOSIS — I35.0 NONRHEUMATIC AORTIC VALVE STENOSIS: ICD-10-CM

## 2021-01-11 DIAGNOSIS — E78.2 MIXED HYPERLIPIDEMIA: ICD-10-CM

## 2021-01-11 DIAGNOSIS — H10.13 ALLERGIC CONJUNCTIVITIS OF BOTH EYES: ICD-10-CM

## 2021-01-11 DIAGNOSIS — I10 ESSENTIAL HYPERTENSION: ICD-10-CM

## 2021-01-11 DIAGNOSIS — F51.01 PRIMARY INSOMNIA: ICD-10-CM

## 2021-01-11 DIAGNOSIS — N18.32 STAGE 3B CHRONIC KIDNEY DISEASE (HCC): ICD-10-CM

## 2021-01-11 DIAGNOSIS — Z76.0 MEDICATION REFILL: ICD-10-CM

## 2021-01-11 DIAGNOSIS — F41.1 GENERALIZED ANXIETY DISORDER: ICD-10-CM

## 2021-01-11 LAB
ALBUMIN SERPL-MCNC: 4.4 G/DL (ref 3.5–5.2)
ALP BLD-CCNC: 110 U/L (ref 35–104)
ALT SERPL-CCNC: 24 U/L (ref 0–32)
ANION GAP SERPL CALCULATED.3IONS-SCNC: 15 MMOL/L (ref 7–16)
AST SERPL-CCNC: 31 U/L (ref 0–31)
BASOPHILS ABSOLUTE: 0.07 E9/L (ref 0–0.2)
BASOPHILS RELATIVE PERCENT: 0.8 % (ref 0–2)
BILIRUB SERPL-MCNC: 0.5 MG/DL (ref 0–1.2)
BUN BLDV-MCNC: 19 MG/DL (ref 8–23)
CALCIUM SERPL-MCNC: 9.4 MG/DL (ref 8.6–10.2)
CHLORIDE BLD-SCNC: 106 MMOL/L (ref 98–107)
CHOLESTEROL, TOTAL: 106 MG/DL (ref 0–199)
CO2: 20 MMOL/L (ref 22–29)
CREAT SERPL-MCNC: 1.1 MG/DL (ref 0.5–1)
EOSINOPHILS ABSOLUTE: 0.09 E9/L (ref 0.05–0.5)
EOSINOPHILS RELATIVE PERCENT: 1 % (ref 0–6)
GFR AFRICAN AMERICAN: 57
GFR NON-AFRICAN AMERICAN: 47 ML/MIN/1.73
GLUCOSE BLD-MCNC: 84 MG/DL (ref 74–99)
HCT VFR BLD CALC: 45.7 % (ref 34–48)
HDLC SERPL-MCNC: 49 MG/DL
HEMOGLOBIN: 14.8 G/DL (ref 11.5–15.5)
IMMATURE GRANULOCYTES #: 0.06 E9/L
IMMATURE GRANULOCYTES %: 0.7 % (ref 0–5)
LDL CHOLESTEROL CALCULATED: 39 MG/DL (ref 0–99)
LYMPHOCYTES ABSOLUTE: 1.9 E9/L (ref 1.5–4)
LYMPHOCYTES RELATIVE PERCENT: 21.5 % (ref 20–42)
MAGNESIUM: 2.2 MG/DL (ref 1.6–2.6)
MCH RBC QN AUTO: 32.7 PG (ref 26–35)
MCHC RBC AUTO-ENTMCNC: 32.4 % (ref 32–34.5)
MCV RBC AUTO: 101.1 FL (ref 80–99.9)
MONOCYTES ABSOLUTE: 0.52 E9/L (ref 0.1–0.95)
MONOCYTES RELATIVE PERCENT: 5.9 % (ref 2–12)
NEUTROPHILS ABSOLUTE: 6.21 E9/L (ref 1.8–7.3)
NEUTROPHILS RELATIVE PERCENT: 70.1 % (ref 43–80)
PDW BLD-RTO: 12.8 FL (ref 11.5–15)
PHOSPHORUS: 3.4 MG/DL (ref 2.5–4.5)
PLATELET # BLD: 178 E9/L (ref 130–450)
PMV BLD AUTO: ABNORMAL FL (ref 7–12)
POTASSIUM SERPL-SCNC: 4.6 MMOL/L (ref 3.5–5)
RBC # BLD: 4.52 E12/L (ref 3.5–5.5)
SODIUM BLD-SCNC: 141 MMOL/L (ref 132–146)
TOTAL PROTEIN: 7.5 G/DL (ref 6.4–8.3)
TRIGL SERPL-MCNC: 89 MG/DL (ref 0–149)
TSH SERPL DL<=0.05 MIU/L-ACNC: 2.25 UIU/ML (ref 0.27–4.2)
URIC ACID, SERUM: 4.9 MG/DL (ref 2.4–5.7)
VLDLC SERPL CALC-MCNC: 18 MG/DL
WBC # BLD: 8.9 E9/L (ref 4.5–11.5)

## 2021-01-11 PROCEDURE — 99214 OFFICE O/P EST MOD 30 MIN: CPT | Performed by: FAMILY MEDICINE

## 2021-01-11 RX ORDER — FLUOCINONIDE 0.5 MG/G
OINTMENT TOPICAL
Qty: 30 G | Refills: 1 | Status: SHIPPED
Start: 2021-01-11 | End: 2022-03-01

## 2021-01-11 RX ORDER — AZELASTINE HYDROCHLORIDE 0.5 MG/ML
1 SOLUTION/ DROPS OPHTHALMIC 2 TIMES DAILY
Qty: 1 BOTTLE | Refills: 1 | Status: SHIPPED
Start: 2021-01-11 | End: 2021-12-29 | Stop reason: SDUPTHER

## 2021-01-11 RX ORDER — HYDROXYZINE HYDROCHLORIDE 10 MG/1
10 TABLET, FILM COATED ORAL 3 TIMES DAILY PRN
Qty: 30 TABLET | Refills: 0 | Status: SHIPPED
Start: 2021-01-11 | End: 2021-01-26 | Stop reason: SDUPTHER

## 2021-01-11 RX ORDER — ALPRAZOLAM 0.5 MG/1
0.5 TABLET ORAL NIGHTLY PRN
Qty: 30 TABLET | Refills: 0 | Status: SHIPPED
Start: 2021-01-11 | End: 2021-02-05 | Stop reason: SDUPTHER

## 2021-01-11 ASSESSMENT — PATIENT HEALTH QUESTIONNAIRE - PHQ9
SUM OF ALL RESPONSES TO PHQ QUESTIONS 1-9: 0
1. LITTLE INTEREST OR PLEASURE IN DOING THINGS: 0

## 2021-01-11 NOTE — PROGRESS NOTES
Subjective:  81 y.o. y/o female is here for follow up chronic hypertension. This is generally controlled on current medication regimen. Takes meds as directed and tolerates them well. Did not get labs done. No symptoms from htn standpoint per ROS. Patient is somewhat compliant with lifestyle modifications. Patient does smoke. Comorbid conditions include below. Cardiology: Dr. Robbie Reynaga, moderate aortic stenosis, seen 10/17, doesn't want to go back, no symptoms, does not want repeat echo  Pulmonology: Dr. Wagner Allen, last seen couple years ago, no inhalers, no symptoms, no desire to go back  Anxiety/insomnia: Xanax 0.5mg qd, trying melatonin and helping some but still trouble with sleeping, tried celexa and didn't like it, taking xanax for many years, now taking xanax at night and helping more, staying awake more during the day, no falls   Hyperlipidemia: crestor 10mg, taking and tolerating, lab due  CKD3: Due for recheck on labs, does not see nephrology, does take ibuprofen 400mg most every other day, no PPI  Derm: Dr. Loli Galeano regularly, appt next month; trouble with left hand, peels skin, lidex ointment, needs refill  Feels strong in legs  Declines DEXA  Continues to smoke  Doesn't feel comfortable going to the casino  Lives with daughter      Patient's past medical, surgical, social and/or family history reviewed, updated in chart, and are non-contributory (unless otherwise stated). Medications and allergies also reviewed and updated in chart.         Review of Systems:  Constitutional:  No fever, no fatigue, no chills, +occ headaches, + weight change (lost 10lb in last 6 months)  Dermatology:  No rash, no mole, + dry or sensitive skin, +picking at hands (when nervous)  ENT:  No cough, no sore throat, no sinus pain, no runny nose, no ear pain, +eyes itchy and watery couple months (using azelastine drops, not helping, no discharge, no vision changes, eye appt in last year)  Cardiology:  No chest pain, no palpitations, no leg edema, no shortness of breath, no PND  Gastroenterology:  No dysphagia, no abdominal pain, no nausea, no vomiting, no constipation, no diarrhea, no heartburn  Musculoskeletal:  + joint pain, no leg cramps, no back pain, no muscle aches  Neuro:  No dizziness, no numbness/tingling, no lightheadedness  Respiratory:  No shortness of breath, no orthopnea, no wheezing, no LINARES  Urology:  No blood in the urine, no urinary frequency, no urinary incontinence, no urinary urgency, no nocturia, no dysuria    Vitals:    01/11/21 1106   BP: 124/72   Pulse: 77   Resp: 16   Temp: 97.9 °F (36.6 °C)   TempSrc: Temporal   SpO2: 98%   Weight: 160 lb (72.6 kg)   Height: 4' 10.5\" (1.486 m)     Body mass index is 32.87 kg/m². General:  Patient alert and oriented x 3, NAD, pleasant, overweight-appearing  HEENT:  Atraumatic, normocephalic, pupils equal and normal, EOMI, right conjunctiva erythematous, eyes watering R>L, +mask  Neck:  Supple, no goiter, no carotid bruits, no LAD  Lungs:  CTA B, symmetric breath sounds, no resp distress, decreased at bases  Heart:  RRR, no gallops or rubs, +2/6 JENNA  Abdomen:  Soft/nt/nd, + bowel sounds  Extremities:  No clubbing, cyanosis or edema  Skin: rash on palms of hands, +skin breakdown on hands from peeling    Assessment/Plan:    May Meeks was seen today for hypertension, chronic kidney disease, hyperlipidemia, insomnia, rash and eye problem. Diagnoses and all orders for this visit:    Essential hypertension, controlled  -     Comprehensive Metabolic Panel; Future  -     Microalbumin / Creatinine Urine Ratio; Future  -     TSH without Reflex; Future  + Continue current medication(s) along with dietary and lifestyle modifications. Mixed hyperlipidemia, controlled  -     Lipid, Fasting; Future  -     TSH without Reflex; Future  + Continue current medication(s) along with dietary and lifestyle modifications.     Stage 3b chronic kidney disease, recheck today, continue to monitor  -     CBC Auto Differential; Future  -     Comprehensive Metabolic Panel; Future  -     Lipid, Fasting; Future  -     Microalbumin / Creatinine Urine Ratio; Future  -     Uric Acid; Future  -     Magnesium; Future  -     Phosphorus; Future  -     PTH, Intact; Future  -     Vitamin D 25 Hydroxy; Future  + Minimize NSAID use    Nonrheumatic aortic valve stenosis, no symptoms  Continue to monitor    Eczema, unspecified type, uncontrolled  -     fluocinonide (LIDEX) 0.05 % ointment; APPLY TWICE A DAY AS NEEDED FOR FLARES  + Keep appt with derm    Generalized anxiety disorder  -     ALPRAZolam (XANAX) 0.5 MG tablet; Take 1 tablet by mouth nightly as needed for Sleep or Anxiety for up to 30 days. + OARRS reviewed, no discrepancies, no falls, understands risks    Primary insomnia  -     ALPRAZolam (XANAX) 0.5 MG tablet; Take 1 tablet by mouth nightly as needed for Sleep or Anxiety for up to 30 days. Allergic conjunctivitis of both eyes  -     azelastine (OPTIVAR) 0.05 % ophthalmic solution; Place 1 drop into both eyes 2 times daily  + Encouraged appt with eye doctor to further evaluate  + Do not think it's bacterial    Medication refill  -     hydrOXYzine (ATARAX) 10 MG tablet; Take 1 tablet by mouth 3 times daily as needed for Itching      Lab today as previously ordered. Declines AWV. As above. Call or go to ED immediately if symptoms worsen or persist.  Return in about 6 months (around 7/11/2021). with above lab prior, or sooner if necessary. Educational materials and/or home exercises printed for patient's review and were included in patient instructions on his/her After Visit Summary and given to patient at the end of visit. Counseled regarding above diagnosis, including possible risks and complications,  especially if left uncontrolled.     Counseled regarding the possible side effects, risks, benefits and alternatives to treatment; patient and/or guardian verbalizes understanding,

## 2021-01-12 ENCOUNTER — TELEPHONE (OUTPATIENT)
Dept: FAMILY MEDICINE CLINIC | Age: 86
End: 2021-01-12

## 2021-01-12 LAB
CREATININE URINE: 235 MG/DL (ref 29–226)
MICROALBUMIN UR-MCNC: 53.7 MG/L
MICROALBUMIN/CREAT UR-RTO: 22.9 (ref 0–30)
VITAMIN D 25-HYDROXY: 41 NG/ML (ref 30–100)

## 2021-01-12 NOTE — TELEPHONE ENCOUNTER
Lab called to advise PTH ordered 1/11/21 could not be tested because there was not enough blood received.  If Dr would like test completed they will need a new order and patient will need to be redrawn

## 2021-01-26 ENCOUNTER — IMMUNIZATION (OUTPATIENT)
Dept: PRIMARY CARE CLINIC | Age: 86
End: 2021-01-26
Payer: MEDICARE

## 2021-01-26 DIAGNOSIS — Z76.0 MEDICATION REFILL: ICD-10-CM

## 2021-01-26 PROCEDURE — 0001A COVID-19, PFIZER VACCINE 30MCG/0.3ML DOSE: CPT | Performed by: PHYSICIAN ASSISTANT

## 2021-01-26 PROCEDURE — 91300 COVID-19, PFIZER VACCINE 30MCG/0.3ML DOSE: CPT | Performed by: PHYSICIAN ASSISTANT

## 2021-01-26 RX ORDER — HYDROXYZINE HYDROCHLORIDE 10 MG/1
10 TABLET, FILM COATED ORAL 3 TIMES DAILY PRN
Qty: 30 TABLET | Refills: 3 | Status: SHIPPED
Start: 2021-01-26 | End: 2021-08-24 | Stop reason: ALTCHOICE

## 2021-01-26 NOTE — TELEPHONE ENCOUNTER
Requested Prescriptions     Pending Prescriptions Disp Refills    hydrOXYzine (ATARAX) 10 MG tablet 30 tablet 3     Sig: Take 1 tablet by mouth 3 times daily as needed for Itching

## 2021-02-05 DIAGNOSIS — F41.1 GENERALIZED ANXIETY DISORDER: ICD-10-CM

## 2021-02-05 DIAGNOSIS — F51.01 PRIMARY INSOMNIA: ICD-10-CM

## 2021-02-05 RX ORDER — ALPRAZOLAM 0.5 MG/1
0.5 TABLET ORAL NIGHTLY PRN
Qty: 30 TABLET | Refills: 0 | Status: SHIPPED
Start: 2021-02-11 | End: 2021-03-08 | Stop reason: SDUPTHER

## 2021-02-05 NOTE — TELEPHONE ENCOUNTER
Requested Prescriptions     Pending Prescriptions Disp Refills    ALPRAZolam (XANAX) 0.5 MG tablet 30 tablet 0     Sig: Take 1 tablet by mouth nightly as needed for Sleep or Anxiety for up to 30 days.

## 2021-02-12 ENCOUNTER — NURSE TRIAGE (OUTPATIENT)
Dept: OTHER | Facility: CLINIC | Age: 86
End: 2021-02-12

## 2021-02-12 NOTE — TELEPHONE ENCOUNTER
[facility] [place] [address] [facility] Brief description of triage: No triage - Pts dtr called to inquire about rescheduling mothers covid vaccine appt d/t winter weather storm next week. Mother is scheduled for 2nd dose on 2/16/21. TC to vaccine line, spoke to Emilee, who explained that cancellations are not being rescheduled at this time. If a site closes for weather, they will contact the pts to rescheduled at that time. Relayed this information to pts dtr. Voiced understanding. States she may call to see if cancellation Is possible on Monday. Care advice provided. Recommended using local department of health website for testing locations and up to date information. Caller verbalizes understanding, denies any other questions or concerns; instructed to call back for any new or worsening symptoms. Reason for Disposition   Information only question and nurse able to answer    Answer Assessment - Initial Assessment Questions  1. REASON FOR CALL or QUESTION: \"What is your reason for calling today? \" or \"How can I best help you? \" or \"What question do you have that I can help answer? \"      Dtr calling to inquire about rescheduling mother covid vaccine d/t upcoming weather.     Protocols used: INFORMATION ONLY CALL - NO TRIAGE-ADULT-[place]

## 2021-02-17 ENCOUNTER — IMMUNIZATION (OUTPATIENT)
Dept: PRIMARY CARE CLINIC | Age: 86
End: 2021-02-17
Payer: MEDICARE

## 2021-02-17 PROCEDURE — 91300 COVID-19, PFIZER VACCINE 30MCG/0.3ML DOSE: CPT | Performed by: NURSE PRACTITIONER

## 2021-02-17 PROCEDURE — 0002A COVID-19, PFIZER VACCINE 30MCG/0.3ML DOSE: CPT | Performed by: NURSE PRACTITIONER

## 2021-03-08 DIAGNOSIS — F41.1 GENERALIZED ANXIETY DISORDER: ICD-10-CM

## 2021-03-08 DIAGNOSIS — F51.01 PRIMARY INSOMNIA: ICD-10-CM

## 2021-03-08 DIAGNOSIS — E78.2 MIXED HYPERLIPIDEMIA: ICD-10-CM

## 2021-03-08 RX ORDER — ALPRAZOLAM 0.5 MG/1
0.5 TABLET ORAL NIGHTLY PRN
Qty: 30 TABLET | Refills: 0 | Status: SHIPPED
Start: 2021-03-08 | End: 2021-04-07 | Stop reason: SDUPTHER

## 2021-03-08 RX ORDER — ROSUVASTATIN CALCIUM 10 MG/1
10 TABLET, COATED ORAL DAILY
Qty: 90 TABLET | Refills: 1 | Status: SHIPPED
Start: 2021-03-08 | End: 2021-06-22 | Stop reason: SDUPTHER

## 2021-03-08 NOTE — TELEPHONE ENCOUNTER
Requested Prescriptions     Pending Prescriptions Disp Refills    ALPRAZolam (XANAX) 0.5 MG tablet 30 tablet 0     Sig: Take 1 tablet by mouth nightly as needed for Sleep or Anxiety for up to 30 days.     rosuvastatin (CRESTOR) 10 MG tablet 90 tablet 1     Sig: Take 1 tablet by mouth daily

## 2021-04-07 DIAGNOSIS — F41.1 GENERALIZED ANXIETY DISORDER: ICD-10-CM

## 2021-04-07 DIAGNOSIS — F51.01 PRIMARY INSOMNIA: ICD-10-CM

## 2021-04-07 RX ORDER — ALPRAZOLAM 0.5 MG/1
0.5 TABLET ORAL NIGHTLY PRN
Qty: 30 TABLET | Refills: 0 | Status: SHIPPED
Start: 2021-04-07 | End: 2021-05-10 | Stop reason: SDUPTHER

## 2021-05-10 DIAGNOSIS — F51.01 PRIMARY INSOMNIA: ICD-10-CM

## 2021-05-10 DIAGNOSIS — F41.1 GENERALIZED ANXIETY DISORDER: ICD-10-CM

## 2021-05-10 RX ORDER — ALPRAZOLAM 0.5 MG/1
0.5 TABLET ORAL NIGHTLY PRN
Qty: 30 TABLET | Refills: 0 | Status: SHIPPED
Start: 2021-05-10 | End: 2021-06-07 | Stop reason: SDUPTHER

## 2021-05-25 DIAGNOSIS — I10 ESSENTIAL HYPERTENSION: ICD-10-CM

## 2021-05-25 RX ORDER — LOSARTAN POTASSIUM 50 MG/1
TABLET ORAL
Qty: 90 TABLET | Refills: 1 | Status: SHIPPED
Start: 2021-05-25 | End: 2021-08-30 | Stop reason: SDUPTHER

## 2021-06-07 DIAGNOSIS — F41.1 GENERALIZED ANXIETY DISORDER: ICD-10-CM

## 2021-06-07 DIAGNOSIS — F51.01 PRIMARY INSOMNIA: ICD-10-CM

## 2021-06-07 RX ORDER — ALPRAZOLAM 0.5 MG/1
0.5 TABLET ORAL NIGHTLY PRN
Qty: 30 TABLET | Refills: 0 | Status: SHIPPED
Start: 2021-06-07 | End: 2021-07-06 | Stop reason: SDUPTHER

## 2021-06-22 DIAGNOSIS — E78.2 MIXED HYPERLIPIDEMIA: ICD-10-CM

## 2021-06-22 RX ORDER — ROSUVASTATIN CALCIUM 10 MG/1
10 TABLET, COATED ORAL DAILY
Qty: 90 TABLET | Refills: 1 | Status: SHIPPED
Start: 2021-06-22 | End: 2021-09-27 | Stop reason: SDUPTHER

## 2021-07-06 DIAGNOSIS — F51.01 PRIMARY INSOMNIA: ICD-10-CM

## 2021-07-06 DIAGNOSIS — F41.1 GENERALIZED ANXIETY DISORDER: ICD-10-CM

## 2021-07-06 RX ORDER — ALPRAZOLAM 0.5 MG/1
0.5 TABLET ORAL NIGHTLY PRN
Qty: 30 TABLET | Refills: 1 | Status: SHIPPED
Start: 2021-07-06 | End: 2021-08-30 | Stop reason: SDUPTHER

## 2021-07-14 ENCOUNTER — NURSE ONLY (OUTPATIENT)
Dept: FAMILY MEDICINE CLINIC | Age: 86
End: 2021-07-14
Payer: MEDICARE

## 2021-07-14 DIAGNOSIS — N18.32 STAGE 3B CHRONIC KIDNEY DISEASE (HCC): ICD-10-CM

## 2021-07-14 DIAGNOSIS — E78.2 MIXED HYPERLIPIDEMIA: ICD-10-CM

## 2021-07-14 DIAGNOSIS — I10 ESSENTIAL HYPERTENSION: ICD-10-CM

## 2021-07-14 LAB
ALBUMIN SERPL-MCNC: 4.4 G/DL (ref 3.5–5.2)
ALP BLD-CCNC: 118 U/L (ref 35–104)
ALT SERPL-CCNC: 16 U/L (ref 0–32)
ANION GAP SERPL CALCULATED.3IONS-SCNC: 17 MMOL/L (ref 7–16)
ANISOCYTOSIS: ABNORMAL
AST SERPL-CCNC: 32 U/L (ref 0–31)
BASOPHILS ABSOLUTE: 0.27 E9/L (ref 0–0.2)
BASOPHILS RELATIVE PERCENT: 2.6 % (ref 0–2)
BILIRUB SERPL-MCNC: 0.4 MG/DL (ref 0–1.2)
BUN BLDV-MCNC: 28 MG/DL (ref 6–23)
BURR CELLS: ABNORMAL
CALCIUM SERPL-MCNC: 9.3 MG/DL (ref 8.6–10.2)
CHLORIDE BLD-SCNC: 106 MMOL/L (ref 98–107)
CHOLESTEROL, FASTING: 103 MG/DL (ref 0–199)
CO2: 21 MMOL/L (ref 22–29)
CREAT SERPL-MCNC: 1.2 MG/DL (ref 0.5–1)
EOSINOPHILS ABSOLUTE: 0 E9/L (ref 0.05–0.5)
EOSINOPHILS RELATIVE PERCENT: 0.1 % (ref 0–6)
GFR AFRICAN AMERICAN: 52
GFR NON-AFRICAN AMERICAN: 43 ML/MIN/1.73
GLUCOSE BLD-MCNC: 76 MG/DL (ref 74–99)
HCT VFR BLD CALC: 43.5 % (ref 34–48)
HDLC SERPL-MCNC: 42 MG/DL
HEMOGLOBIN: 14 G/DL (ref 11.5–15.5)
LDL CHOLESTEROL CALCULATED: 42 MG/DL (ref 0–99)
LYMPHOCYTES ABSOLUTE: 2.5 E9/L (ref 1.5–4)
LYMPHOCYTES RELATIVE PERCENT: 23.5 % (ref 20–42)
MAGNESIUM: 2.1 MG/DL (ref 1.6–2.6)
MCH RBC QN AUTO: 32.4 PG (ref 26–35)
MCHC RBC AUTO-ENTMCNC: 32.2 % (ref 32–34.5)
MCV RBC AUTO: 100.7 FL (ref 80–99.9)
MONOCYTES ABSOLUTE: 0.52 E9/L (ref 0.1–0.95)
MONOCYTES RELATIVE PERCENT: 5.2 % (ref 2–12)
NEUTROPHILS ABSOLUTE: 7.18 E9/L (ref 1.8–7.3)
NEUTROPHILS RELATIVE PERCENT: 68.7 % (ref 43–80)
OVALOCYTES: ABNORMAL
PARATHYROID HORMONE INTACT: 39 PG/ML (ref 15–65)
PDW BLD-RTO: 12.8 FL (ref 11.5–15)
PHOSPHORUS: 3.9 MG/DL (ref 2.5–4.5)
PLATELET # BLD: 222 E9/L (ref 130–450)
PMV BLD AUTO: 14.5 FL (ref 7–12)
POIKILOCYTES: ABNORMAL
POTASSIUM SERPL-SCNC: 5.1 MMOL/L (ref 3.5–5)
RBC # BLD: 4.32 E12/L (ref 3.5–5.5)
SODIUM BLD-SCNC: 144 MMOL/L (ref 132–146)
TOTAL PROTEIN: 7.4 G/DL (ref 6.4–8.3)
TRIGLYCERIDE, FASTING: 94 MG/DL (ref 0–149)
TSH SERPL DL<=0.05 MIU/L-ACNC: 2.05 UIU/ML (ref 0.27–4.2)
URIC ACID, SERUM: 5 MG/DL (ref 2.4–5.7)
VITAMIN D 25-HYDROXY: 45 NG/ML (ref 30–100)
VLDLC SERPL CALC-MCNC: 19 MG/DL
WBC # BLD: 10.4 E9/L (ref 4.5–11.5)

## 2021-07-14 PROCEDURE — 36415 COLL VENOUS BLD VENIPUNCTURE: CPT | Performed by: FAMILY MEDICINE

## 2021-08-03 NOTE — TELEPHONE ENCOUNTER
- Last rx was written on 7/6/21 for 30 with 1 refill.  - I called pharmacy to confirm there is a refill on file, they will get ready for patient.  - LVM stating that pharmacy has a refill on file, they are going to get this ready for her today and to call if they have any questions.

## 2021-08-24 ENCOUNTER — OFFICE VISIT (OUTPATIENT)
Dept: FAMILY MEDICINE CLINIC | Age: 86
End: 2021-08-24

## 2021-08-24 VITALS
OXYGEN SATURATION: 97 % | TEMPERATURE: 97.7 F | RESPIRATION RATE: 20 BRPM | WEIGHT: 155.2 LBS | BODY MASS INDEX: 29.3 KG/M2 | SYSTOLIC BLOOD PRESSURE: 136 MMHG | HEART RATE: 60 BPM | HEIGHT: 61 IN | DIASTOLIC BLOOD PRESSURE: 67 MMHG

## 2021-08-24 DIAGNOSIS — N18.32 STAGE 3B CHRONIC KIDNEY DISEASE (HCC): ICD-10-CM

## 2021-08-24 DIAGNOSIS — E78.2 MIXED HYPERLIPIDEMIA: ICD-10-CM

## 2021-08-24 DIAGNOSIS — Z00.00 ROUTINE GENERAL MEDICAL EXAMINATION AT A HEALTH CARE FACILITY: Primary | ICD-10-CM

## 2021-08-24 PROCEDURE — G0438 PPPS, INITIAL VISIT: HCPCS | Performed by: FAMILY MEDICINE

## 2021-08-24 ASSESSMENT — PATIENT HEALTH QUESTIONNAIRE - PHQ9
SUM OF ALL RESPONSES TO PHQ9 QUESTIONS 1 & 2: 0
SUM OF ALL RESPONSES TO PHQ QUESTIONS 1-9: 0
2. FEELING DOWN, DEPRESSED OR HOPELESS: 0
SUM OF ALL RESPONSES TO PHQ QUESTIONS 1-9: 0
SUM OF ALL RESPONSES TO PHQ QUESTIONS 1-9: 0
1. LITTLE INTEREST OR PLEASURE IN DOING THINGS: 0

## 2021-08-24 ASSESSMENT — LIFESTYLE VARIABLES: HOW OFTEN DO YOU HAVE A DRINK CONTAINING ALCOHOL: 0

## 2021-08-24 NOTE — PROGRESS NOTES
Medicare Annual Wellness Visit  Name: Tiffani Valerio Date: 2021   MRN: [id number] Sex: Female   Age: 81 y.o. Ethnicity: Non- / Non    : 1935 Race: White (non-)      Finesse Dominguez is here for Medicare AWV and Hypertension    Reviewed recent labs with patient, overall stable  Cardiology: Dr. Trae Evans, moderate aortic stenosis, seen 10/17, doesn't want to go back, no symptoms, does not want repeat echo  Pulmonology: Dr. Bebeto Desai, last seen couple years ago, no inhalers, no symptoms, no desire to go back  Anxiety/insomnia: Xanax 0.5mg nightly, taking xanax for many years, now taking xanax at night and helping more, staying awake more during the day, no falls   No longer taking melatonin, xanax only at night  Hyperlipidemia: crestor 10mg, taking and tolerating  CKD3: Stable, does not see nephrology, does take ibuprofen 400mg most every day now, no PPI  Derm: Dr. Tanner Her regularly, appt next month; hand rash resolved  Feels strong in legs  Declines DEXA  Continues to smoke  Back in casinos  Lives with daughter    Screenings for behavioral, psychosocial and functional/safety risks, and cognitive dysfunction are all negative except as indicated below. These results, as well as other patient data from the [address] form, are documented in Flowsheets linked to this Encounter. No Known Allergies      Prior to Visit Medications    Medication Sig Taking? Authorizing Provider   ALPRAZolam Belkya Garrido) 0.5 MG tablet Take 1 tablet by mouth nightly as needed for Sleep or Anxiety for up to 60 days.  Yes Chucho Reaves MD   rosuvastatin (CRESTOR) 10 MG tablet Take 1 tablet by mouth daily Yes Chucho Reaves MD   losartan (COZAAR) 50 MG tablet TAKE ONE TABLET BY MOUTH EVERY DAY Yes Chucho Reaves MD   azelastine (OPTIVAR) 0.05 % ophthalmic solution Place 1 drop into both eyes 2 times daily Yes Chucho Reaves MD   fluocinonide (LIDEX) 0.05 % ointment APPLY TWICE A DAY AS NEEDED FOR FLARES Yes El Hobbs MD   betamethasone dipropionate (DIPROLENE) 0.05 % ointment APPLY  TWICE DAILY AS NEEDED FOR FLARES FOR 14 DAYS Yes Historical Provider, MD   vitamin B-6 (PYRIDOXINE) 100 MG tablet Take 100 mg by mouth daily Yes Historical Provider, MD   Cholecalciferol (VITAMIN D3) 1000 units CAPS Take by mouth daily Yes Historical Provider, MD   vitamin B-12 (CYANOCOBALAMIN) 1000 MCG tablet Take 1,000 mcg by mouth daily Yes Historical Provider, MD         Past Medical History:   Diagnosis Date    Aortic stenosis     COPD (chronic obstructive pulmonary disease) (HCC)     Disorder of bone     Elevated hemoglobin A1c     Fatigue     Hyperlipidemia     Hypertension     Smoker     Wound of right leg        History reviewed. No pertinent surgical history. Family History   Problem Relation Age of Onset    No Known Problems Mother     Stroke Father     No Known Problems Brother        CareTeam (Including outside providers/suppliers regularly involved in providing care):   Patient Care Team:  El Hobbs MD as PCP - General (Family Medicine)  El Hobbs MD as PCP - [facility] Empaneled Provider    Wt Readings from Last 3 Encounters:   08/24/21 155 lb 3.2 oz (70.4 kg)   01/11/21 160 lb (72.6 kg)   06/08/20 168 lb 12.8 oz (76.6 kg)     Vitals:    08/24/21 0947   BP: 136/67   Pulse: 60   Resp: 20   Temp: 97.7 °F (36.5 °C)   SpO2: 97%   Weight: 155 lb 3.2 oz (70.4 kg)   Height: 5' 1\" (1.549 m)     Body mass index is 29.32 kg/m². Based upon direct observation of the patient, evaluation of cognition reveals recent and remote memory intact.     General:  Patient alert and oriented x 3, NAD, pleasant, overweight-appearing  HEENT:  Atraumatic, normocephalic, pupils equal and normal, EOMI, +mask  Neck:  Supple, no goiter, no carotid bruits, no LAD  Lungs:  CTA B, symmetric breath sounds, no resp distress, decreased at bases  Heart:  RRR, no gallops or rubs, +2/6 JENNA  Abdomen:  Soft/nt/nd, + bowel sounds  Extremities:  No clubbing, cyanosis or edema  Skin: unremarkable    Patient's complete Health Risk Assessment and screening values have been reviewed and are found in Flowsheets. The following problems were reviewed today and where indicated follow up appointments were made and/or referrals ordered. Positive Risk Factor Screenings with Interventions:     Fall Risk:  Timed Up and Go Test > 12 seconds? (Complete if either Fall Risk answers are Yes): no  2 or more falls in past year?: (!) yes  Fall with injury in past year?: no  Fall Risk Interventions:    · Home safety tips provided  · Patient declines any further evaluation/treatment for this issue       Substance History:  Social History     Tobacco History     Smoking Status  Current Every Day Smoker Smoking Frequency  0.5 packs/day Smoking Tobacco Type  Cigarettes    Smokeless Tobacco Use  Never Used          Alcohol History     Alcohol Use Status  No          Drug Use     Drug Use Status  No          Sexual Activity     Sexually Active  Not Currently               Alcohol Screening:       A score of 8 or more is associated with harmful or hazardous drinking. A score of 13 or more in women, and 15 or more in men, is likely to indicate alcohol dependence. Substance Abuse Interventions:  · Tobacco abuse:  patient is not ready to work toward tobacco cessation at this time    [address] [facility] and ACP:  General  In general, how would you say your health is?: Good  In the past 7 days, have you experienced any of the following?  New or Increased Pain, New or Increased Fatigue, Loneliness, Social Isolation, Stress or Anger?: None of These  Do you get the social and emotional support that you need?: Yes  Do you have a Living Will?: (!) No  Advance Directives     Power of [address] [place] [address] Will ACP-Advance Directive ACP-Power of     Not on File Not on File Not on File Not on File      General Health Risk Interventions:  · No Living Will: Paperwork/information given, plans to get done    Health Habits/Nutrition:  Health Habits/Nutrition  Do you exercise for at least 20 minutes 2-3 times per week?: (!) No  Have you lost any weight without trying in the past 3 months?: No  Do you eat only one meal per day?: (!) Yes  Have you seen the dentist within the past year?: N/A - wear dentures  Body mass index: (!) 29.32  Health Habits/Nutrition Interventions:  · Inadequate physical activity:  Willing to work on increasing activity     Safety:  Safety  Do you have working smoke detectors?: Yes  Have all throw rugs been removed or fastened?: Yes  Do you have non-slip mats or surfaces in all bathtubs/showers?: yes  Do all of your stairways have a railing or banister?: Yes  Are your doorways, halls and stairs free of clutter?: Yes  Do you always fasten your seatbelt when you are in a car?: Yes  Safety Interventions:  · Home safety tips provided     Personalized Preventive Plan   Current Health Maintenance Status  Immunization History   Administered Date(s) Administered    COVID-19, Ambrose Peter, PF, 30mcg/0.3mL 01/26/2021, 02/17/2021    Influenza Vaccine, unspecified formulation 09/30/2014, 10/16/2018    Influenza, High Dose (Fluzone 65 yrs and older) 09/29/2016    Influenza, MDCK Quadv, IM, PF (Flucelvax 4 yrs and older) 01/19/2018    Influenza, Quadv, adjuvanted, 65 yrs +, IM, PF (Fluad) 10/07/2020    Influenza, Triv, inactivated, subunit, adjuvanted, IM (Fluad 65 yrs and older) 12/11/2018, 09/30/2019    Pneumococcal Conjugate 13-valent ([id number]) 05/28/2019    Pneumococcal Polysaccharide ([id number]) 11/15/2017    Tdap (Boostrix, Adacel) 11/22/2016, 01/09/2019        Health Maintenance   Topic Date Due    Shingles Vaccine (1 of 2) Never done    DEXA (modify frequency per FRAX score)  Never done   [employer] Visit (AWV)  Never done    Flu vaccine (1) 09/01/2021    Lipid screen  07/14/2022    Potassium monitoring  07/14/2022    Creatinine monitoring  07/14/2022    DTaP/Tdap/Td vaccine (3 - Td or Tdap) 01/09/2029    Pneumococcal 65+ years Vaccine  Completed    COVID-19 Vaccine  Completed    Hepatitis A vaccine  Aged Out    Hepatitis B vaccine  Aged Out    Hib vaccine  Aged Out    Meningococcal (ACWY) vaccine  Aged Out     Recommendations for bSafe Due: see orders and patient instructions/AVS.  . Recommended screening schedule for the next 5-10 years is provided to the patient in written form: see Patient Dayami Fry was seen today for medicare awv and hypertension. Diagnoses and all orders for this visit:    Routine general medical examination at a health care facility  See above    Stage 3b chronic kidney disease ([facility] [address] stable  -     Comprehensive Metabolic Panel; Future  -     CBC Auto Differential; Future  + Plan for full lab eval yearly    Mixed hyperlipidemia  -     LIPID PANEL; Future    Will check about Shingrix at the pharmacy. RTC in 6 months or sooner as needed with above lab prior.

## 2021-08-30 DIAGNOSIS — I10 ESSENTIAL HYPERTENSION: ICD-10-CM

## 2021-08-30 DIAGNOSIS — F41.1 GENERALIZED ANXIETY DISORDER: ICD-10-CM

## 2021-08-30 DIAGNOSIS — F51.01 PRIMARY INSOMNIA: ICD-10-CM

## 2021-08-30 RX ORDER — LOSARTAN POTASSIUM 50 MG/1
TABLET ORAL
Qty: 90 TABLET | Refills: 1 | Status: SHIPPED
Start: 2021-08-30 | End: 2022-03-25 | Stop reason: SDUPTHER

## 2021-08-30 RX ORDER — ALPRAZOLAM 0.5 MG/1
0.5 TABLET ORAL NIGHTLY PRN
Qty: 30 TABLET | Refills: 1 | Status: SHIPPED
Start: 2021-08-30 | End: 2021-10-04 | Stop reason: SDUPTHER

## 2021-09-27 DIAGNOSIS — E78.2 MIXED HYPERLIPIDEMIA: ICD-10-CM

## 2021-09-27 RX ORDER — ROSUVASTATIN CALCIUM 10 MG/1
10 TABLET, COATED ORAL DAILY
Qty: 90 TABLET | Refills: 1 | Status: SHIPPED
Start: 2021-09-27 | End: 2022-04-04 | Stop reason: SDUPTHER

## 2021-10-04 DIAGNOSIS — F51.01 PRIMARY INSOMNIA: ICD-10-CM

## 2021-10-04 DIAGNOSIS — F41.1 GENERALIZED ANXIETY DISORDER: ICD-10-CM

## 2021-10-04 RX ORDER — ALPRAZOLAM 0.5 MG/1
0.5 TABLET ORAL NIGHTLY PRN
Qty: 30 TABLET | Refills: 1 | Status: SHIPPED
Start: 2021-10-04 | End: 2021-11-29 | Stop reason: SDUPTHER

## 2021-11-01 NOTE — TELEPHONE ENCOUNTER
Tried to call patient, no answer, no voicemail  - Alprazolam 0.5 mg tab was sent on 10/4/21 for 30 tab/1R.

## 2021-11-29 DIAGNOSIS — F41.1 GENERALIZED ANXIETY DISORDER: ICD-10-CM

## 2021-11-29 DIAGNOSIS — F51.01 PRIMARY INSOMNIA: ICD-10-CM

## 2021-11-29 RX ORDER — ALPRAZOLAM 0.5 MG/1
0.5 TABLET ORAL NIGHTLY PRN
Qty: 30 TABLET | Refills: 1 | Status: SHIPPED
Start: 2021-11-29 | End: 2022-01-31 | Stop reason: SDUPTHER

## 2021-12-02 ENCOUNTER — TELEPHONE (OUTPATIENT)
Dept: FAMILY MEDICINE CLINIC | Age: 86
End: 2021-12-02

## 2021-12-02 NOTE — TELEPHONE ENCOUNTER
Patient has a constant runny nose and a cough for 3 days. No headache, body aches or fever. She has tried Robitussin with no relief. Please advise if there is something you can send in for her.

## 2021-12-02 NOTE — TELEPHONE ENCOUNTER
Called patient; she stated it was not allergies. Feels like a cold. Advised to try Flonase and Mucinex DM and let us know how she is feeling in a few days. Patient understands and agrees.

## 2021-12-02 NOTE — TELEPHONE ENCOUNTER
Feel like allergies or a cold? If allergies, she can try a nasal spray like flonase. Otherwise, she can try something like mucinex DM--should help decongest and thin the mucus. Thanks.

## 2021-12-09 ENCOUNTER — TELEPHONE (OUTPATIENT)
Dept: FAMILY MEDICINE CLINIC | Age: 86
End: 2021-12-09

## 2021-12-09 DIAGNOSIS — J44.1 COPD EXACERBATION (HCC): Primary | ICD-10-CM

## 2021-12-09 RX ORDER — ALBUTEROL SULFATE 90 UG/1
2 AEROSOL, METERED RESPIRATORY (INHALATION) 4 TIMES DAILY
Qty: 18 G | Refills: 0 | Status: SHIPPED
Start: 2021-12-09 | End: 2022-03-01

## 2021-12-09 RX ORDER — PREDNISONE 20 MG/1
40 TABLET ORAL DAILY
Qty: 20 TABLET | Refills: 0 | Status: SHIPPED | OUTPATIENT
Start: 2021-12-09 | End: 2021-12-19

## 2021-12-09 RX ORDER — AZITHROMYCIN 250 MG/1
250 TABLET, FILM COATED ORAL SEE ADMIN INSTRUCTIONS
Qty: 6 TABLET | Refills: 0 | Status: SHIPPED | OUTPATIENT
Start: 2021-12-09 | End: 2021-12-14

## 2021-12-09 RX ORDER — GUAIFENESIN 600 MG/1
600 TABLET, EXTENDED RELEASE ORAL 2 TIMES DAILY
Qty: 30 TABLET | Refills: 0 | Status: SHIPPED | OUTPATIENT
Start: 2021-12-09 | End: 2021-12-24

## 2021-12-09 NOTE — TELEPHONE ENCOUNTER
Scripts sent for oral steroids, zpack, mucinex, and albuterol inhaler. If she continues to not feel better or gets any worse, she will need to be seen. Thanks.

## 2021-12-09 NOTE — TELEPHONE ENCOUNTER
Patient is calling again this week regarding chest tightness and runny nose. The patient was prescribed Mucinex, but her chest continues to be congested. Her runny nose is better. Please advise.

## 2021-12-29 DIAGNOSIS — H10.13 ALLERGIC CONJUNCTIVITIS OF BOTH EYES: ICD-10-CM

## 2021-12-29 RX ORDER — AZELASTINE HYDROCHLORIDE 0.5 MG/ML
1 SOLUTION/ DROPS OPHTHALMIC 2 TIMES DAILY
Qty: 1 EACH | Refills: 2 | Status: SHIPPED
Start: 2021-12-29 | End: 2022-07-21

## 2021-12-29 NOTE — TELEPHONE ENCOUNTER
----- Message from Namrata Baugh sent at 12/28/2021  4:56 PM EST -----  Subject: Refill Request    QUESTIONS  Name of Medication? azelastine (OPTIVAR) 0.05 % ophthalmic solution  Patient-reported dosage and instructions? once at night time   How many days do you have left? 6  Preferred Pharmacy? [facility] phone number (if available)? [phone]  Additional Information for Provider? needs refill   ---------------------------------------------------------------------------  --------------  CALL BACK INFO  What is the best way for the office to contact you? Do not leave any   message, patient will call back for answer  Preferred Call Back Phone Number?  [phone]

## 2022-01-04 ENCOUNTER — TELEPHONE (OUTPATIENT)
Dept: FAMILY MEDICINE CLINIC | Age: 87
End: 2022-01-04

## 2022-01-04 NOTE — TELEPHONE ENCOUNTER
Patient states cough is dry, non-productive. Only other symptom is a runny nose. No SOB, fever, etc. She completed the antibiotic & steroid prescribed 12/9/21 and felt better until the last 2 days when the cough started.

## 2022-01-04 NOTE — TELEPHONE ENCOUNTER
What has she tried this time? The Mucinex DM that was used last time? Albuterol? No dyspnea? Thanks.

## 2022-01-04 NOTE — TELEPHONE ENCOUNTER
No answer on home phone/no option to leave message.  Will try to reach patient again in the morning no

## 2022-01-04 NOTE — TELEPHONE ENCOUNTER
Patient would like a cough medicine called into [facility] for chronic cough. Patient has not been Covid tested recently.

## 2022-01-31 DIAGNOSIS — F41.1 GENERALIZED ANXIETY DISORDER: ICD-10-CM

## 2022-01-31 DIAGNOSIS — F51.01 PRIMARY INSOMNIA: ICD-10-CM

## 2022-01-31 RX ORDER — ALPRAZOLAM 0.5 MG/1
0.5 TABLET ORAL NIGHTLY PRN
Qty: 30 TABLET | Refills: 1 | Status: SHIPPED
Start: 2022-01-31 | End: 2022-04-04 | Stop reason: SDUPTHER

## 2022-02-23 DIAGNOSIS — E78.2 MIXED HYPERLIPIDEMIA: ICD-10-CM

## 2022-02-23 DIAGNOSIS — N18.32 STAGE 3B CHRONIC KIDNEY DISEASE (HCC): ICD-10-CM

## 2022-02-23 LAB
ALBUMIN SERPL-MCNC: 4.1 G/DL (ref 3.5–5.2)
ALP BLD-CCNC: 104 U/L (ref 35–104)
ALT SERPL-CCNC: 20 U/L (ref 0–32)
ANION GAP SERPL CALCULATED.3IONS-SCNC: 12 MMOL/L (ref 7–16)
AST SERPL-CCNC: 31 U/L (ref 0–31)
BASOPHILS ABSOLUTE: 0.05 E9/L (ref 0–0.2)
BASOPHILS RELATIVE PERCENT: 0.7 % (ref 0–2)
BILIRUB SERPL-MCNC: 0.5 MG/DL (ref 0–1.2)
BUN BLDV-MCNC: 21 MG/DL (ref 6–23)
CALCIUM SERPL-MCNC: 9.1 MG/DL (ref 8.6–10.2)
CHLORIDE BLD-SCNC: 108 MMOL/L (ref 98–107)
CHOLESTEROL, TOTAL: 116 MG/DL (ref 0–199)
CO2: 24 MMOL/L (ref 22–29)
CREAT SERPL-MCNC: 1.1 MG/DL (ref 0.5–1)
EOSINOPHILS ABSOLUTE: 0 E9/L (ref 0.05–0.5)
EOSINOPHILS RELATIVE PERCENT: 0 % (ref 0–6)
GFR AFRICAN AMERICAN: 57
GFR NON-AFRICAN AMERICAN: 47 ML/MIN/1.73
GLUCOSE BLD-MCNC: 76 MG/DL (ref 74–99)
HCT VFR BLD CALC: 41.8 % (ref 34–48)
HDLC SERPL-MCNC: 52 MG/DL
HEMOGLOBIN: 13.4 G/DL (ref 11.5–15.5)
IMMATURE GRANULOCYTES #: 0.01 E9/L
IMMATURE GRANULOCYTES %: 0.1 % (ref 0–5)
LDL CHOLESTEROL CALCULATED: 47 MG/DL (ref 0–99)
LYMPHOCYTES ABSOLUTE: 1.89 E9/L (ref 1.5–4)
LYMPHOCYTES RELATIVE PERCENT: 24.6 % (ref 20–42)
MCH RBC QN AUTO: 32.4 PG (ref 26–35)
MCHC RBC AUTO-ENTMCNC: 32.1 % (ref 32–34.5)
MCV RBC AUTO: 101.2 FL (ref 80–99.9)
MONOCYTES ABSOLUTE: 0.56 E9/L (ref 0.1–0.95)
MONOCYTES RELATIVE PERCENT: 7.3 % (ref 2–12)
NEUTROPHILS ABSOLUTE: 5.17 E9/L (ref 1.8–7.3)
NEUTROPHILS RELATIVE PERCENT: 67.3 % (ref 43–80)
PDW BLD-RTO: 12.8 FL (ref 11.5–15)
PLATELET # BLD: 187 E9/L (ref 130–450)
PMV BLD AUTO: 13.7 FL (ref 7–12)
POTASSIUM SERPL-SCNC: 4.1 MMOL/L (ref 3.5–5)
RBC # BLD: 4.13 E12/L (ref 3.5–5.5)
SODIUM BLD-SCNC: 144 MMOL/L (ref 132–146)
TOTAL PROTEIN: 7 G/DL (ref 6.4–8.3)
TRIGL SERPL-MCNC: 85 MG/DL (ref 0–149)
VLDLC SERPL CALC-MCNC: 17 MG/DL
WBC # BLD: 7.7 E9/L (ref 4.5–11.5)

## 2022-03-01 ENCOUNTER — OFFICE VISIT (OUTPATIENT)
Dept: FAMILY MEDICINE CLINIC | Age: 87
End: 2022-03-01
Payer: MEDICARE

## 2022-03-01 VITALS
RESPIRATION RATE: 14 BRPM | OXYGEN SATURATION: 99 % | HEIGHT: 61 IN | DIASTOLIC BLOOD PRESSURE: 72 MMHG | SYSTOLIC BLOOD PRESSURE: 132 MMHG | WEIGHT: 153 LBS | HEART RATE: 78 BPM | BODY MASS INDEX: 28.89 KG/M2 | TEMPERATURE: 97.8 F

## 2022-03-01 DIAGNOSIS — E78.2 MIXED HYPERLIPIDEMIA: ICD-10-CM

## 2022-03-01 DIAGNOSIS — J43.0 UNILATERAL EMPHYSEMA (HCC): ICD-10-CM

## 2022-03-01 DIAGNOSIS — F51.01 PRIMARY INSOMNIA: ICD-10-CM

## 2022-03-01 DIAGNOSIS — I10 ESSENTIAL HYPERTENSION: Primary | ICD-10-CM

## 2022-03-01 DIAGNOSIS — I35.0 NONRHEUMATIC AORTIC VALVE STENOSIS: ICD-10-CM

## 2022-03-01 DIAGNOSIS — N18.31 STAGE 3A CHRONIC KIDNEY DISEASE (HCC): ICD-10-CM

## 2022-03-01 PROBLEM — L97.912 TRAUMATIC ULCER OF LOWER LEG, RIGHT, WITH FAT LAYER EXPOSED (HCC): Status: RESOLVED | Noted: 2018-03-28 | Resolved: 2022-03-01

## 2022-03-01 PROCEDURE — 99214 OFFICE O/P EST MOD 30 MIN: CPT | Performed by: FAMILY MEDICINE

## 2022-03-01 NOTE — PATIENT INSTRUCTIONS
Patient Education        Insomnia: Care Instructions  Overview     Insomnia is the inability to sleep well. Insomnia may make it hard for you to get to sleep, stay asleep, or sleep as long as you need to. This can make you tired and grouchy during the day. It can also make you forgetful, less effective at work, and unhappy. Insomnia can be linked to many things. These include health problems, medicines, and stressful events. Treatment may include treating problems that may be linked with your insomnia. Treatment also includes behavior and lifestyle changes. This may include cognitive-behavioral therapy for insomnia (CBT-I). CBT-I uses different ways to help you change your thoughts and behaviors that may interfere with sleep. Your doctor can recommend specific things you can try. Examples include doing relaxation exercises, keeping regular bedtimes and wake times, limiting alcohol, and making healthy sleep habits. Some people decide to take medicine for a while to help with sleep. Follow-up care is a key part of your treatment and safety. Be sure to make and go to all appointments, and call your doctor if you are having problems. It's also a good idea to know your test results and keep a list of the medicines you take. How can you care for yourself at home? Cognitive-behavioral therapy for insomnia (CBT-I)  · If your doctor recommends CBT-I, follow your treatment plan. Your doctor will give you instructions that are unique for you. · Your plan will likely include a few things that you can try at home. For example:  ? Try meditation or other relaxation techniques before you go to bed. ? Go to bed at the same time every night, and wake up at the same time every morning. Do not take naps during the day. ? Do not stay in bed awake for too long.  If you can't fall asleep, or if you wake up in the middle of the night and can't get back to sleep within about 15 to 20 minutes, get out of bed and go to another room until you feel sleepy. ? If watching the clock makes you anxious, turn it facing away from you so you cannot see the time. ? If you worry when you lie down, start a worry book. Well before bedtime, write down your worries, and then set the book and your concerns aside. Healthy sleep habits  · If your doctor recommends it, try making healthy sleep habits. For example:  ? Keep your bedroom quiet, dark, and cool. ? Do not have drinks with caffeine, such as coffee or black tea, for 8 hours before bed. ? Do not smoke or use other types of tobacco near bedtime. Nicotine is a stimulant and can keep you awake. ? Avoid drinking alcohol late in the evening, because it can cause you to wake in the middle of the night. ? Do not eat a big meal close to bedtime. If you are hungry, eat a light snack. ? Do not drink a lot of water close to bedtime, because the need to urinate may wake you up during the night. ? Do not read, watch TV, or use your phone in bed. Use the bed only for sleeping and sex. Medicine  · Be safe with medicines. Take your medicines exactly as prescribed. Call your doctor if you think you are having a problem with your medicine. · Talk with your doctor before you try an over-the-counter medicine, herbal product, or supplement to try to improve your sleep. Your doctor can recommend how much to take and when to take it. Make sure your doctor knows all of the medicines, vitamins, herbal products, and supplements you take. · You will get more details on the specific medicines your doctor prescribes. When should you call for help? Watch closely for changes in your health, and be sure to contact your doctor if:    · Your efforts to improve your sleep do not work.     · Your insomnia gets worse.     · You have been feeling down, depressed, or hopeless or have lost interest in things that you usually enjoy. Where can you learn more? Go to [email or web address] org and sign in to your [employer] account. Enter P513 in the [facility] box to learn more about \"Insomnia: Care Instructions. \"     If you do not have an account, please click on the \"Sign Up Now\" link. Current as of: June 16, 2021               Content Version: 13.1  © 4634-7505 [employer], [employer]orporated. Care instructions adapted under license by [facility] ([facility]). If you have questions about a medical condition or this instruction, always ask your healthcare professional. [address] any warranty or liability for your use of this information.

## 2022-03-01 NOTE — PROGRESS NOTES
Subjective:  81 y.o. y/o female is here for follow up chronic hypertension. This is generally controlled on current medication regimen. Takes meds as directed and tolerates them well. Reviewed recent labs with patient, overall stable. No symptoms from htn standpoint per ROS. Patient is somewhat compliant with lifestyle modifications. Patient does smoke. Comorbid conditions include below. Cardiology: Dr. Nelsy Avalos, moderate aortic stenosis, seen 10/17, doesn't want to go back, no symptoms, does not want repeat echo  Pulmonology: Dr. Nahomy Trevino, last seen couple years ago, no inhalers, no symptoms, no desire to go back  Anxiety/insomnia: Xanax 0.5mg nightly, taking xanax for many years, now taking xanax at night and helping more, staying awake more during the day, no falls, able to fall asleep but trouble staying asleep, sleeps 4.5-5 hours/night   Hyperlipidemia: crestor 10mg, taking and tolerating  CKD3: Stable, does not see nephrology, does take ibuprofen 400mg most every day now, no PPI  Derm: Dr. Isis Keita regularly  Feels strong in legs, no fall in over 2 years  Declines DEXA  Continues to smoke, no desire to quit  Back in casinos, +bingo nights  Lives with daughter      Patient's past medical, surgical, social and/or family history reviewed, updated in chart, and are non-contributory (unless otherwise stated). Medications and allergies also reviewed and updated in chart.         Review of Systems:  Constitutional:  No fever, no fatigue, no chills, +occ headaches, no weight change  Dermatology:  No rash, no mole, + dry or sensitive skin, +picking at hands (when nervous)  ENT:  No cough, no sore throat, no sinus pain, no runny nose, no ear pain  Cardiology:  No chest pain, no palpitations, no leg edema, no shortness of breath, no PND  Gastroenterology:  No dysphagia, no abdominal pain, no nausea, no vomiting, no constipation, no diarrhea, no heartburn  Musculoskeletal:  + joint pain, no leg cramps, no back pain, no muscle aches  Neuro:  No dizziness, no numbness/tingling, no lightheadedness  Respiratory:  No shortness of breath, no orthopnea, no wheezing, no LINARES  Urology:  No blood in the urine, no urinary frequency, no urinary incontinence, no urinary urgency, no nocturia, no dysuria    Vitals:    03/01/22 1036   BP: 132/72   Pulse: 78   Resp: 14   Temp: 97.8 °F (36.6 °C)   TempSrc: Temporal   SpO2: 99%   Weight: 153 lb (69.4 kg)   Height: 5' 1\" (1.549 m)     Body mass index is 28.91 kg/m². General:  Patient alert and oriented x 3, NAD, pleasant, overweight-appearing  HEENT:  Atraumatic, normocephalic, pupils equal and normal, EOMI, right conjunctiva erythematous, +mask  Neck:  Supple, no goiter, no carotid bruits, no LAD  Lungs:  CTA B, symmetric breath sounds, no resp distress, decreased at bases  Heart:  RRR, no gallops or rubs, +2/6 JENNA  Abdomen:  Soft/nt/nd, + bowel sounds  Extremities:  No clubbing, cyanosis or edema  Skin: overall unremarkable    Assessment/Plan:    Dave Narvaez was seen today for hypertension, hyperlipidemia, chronic kidney disease and insomnia. Diagnoses and all orders for this visit:    Essential hypertension, controlled  + Continue current medication(s) along with dietary and lifestyle modifications. Stage 3a chronic kidney disease ([address] stable  -     Comprehensive Metabolic Panel; Future  -     CBC with Auto Differential; Future  -     Magnesium; Future  -     Microalbumin / Creatinine Urine Ratio; Future  -     Phosphorus; Future  -     PTH, Intact; Future  -     Vitamin D 25 Hydroxy; Future  -     Uric Acid; Future  + Continue to monitor  + Try to avoid NSAIDs    Mixed hyperlipidemia, controlled  -     LIPID PANEL; Future  + Continue current medication(s) along with dietary and lifestyle modifications.     Primary insomnia  Does use xanax at night, understands risks, no falls or confusion  Emphasized good sleep hygiene  Needs to get out of bed when wakes up and not watch TV in bed  No TV when can't sleep, try to read instead  Advised against cigarette prior to bedtime    Nonrheumatic aortic valve stenosis, no symptoms  Continue to monitor    Unilateral emphysema (HCC)  No symptoms  No inhalers  Encourage smoking cessation        As above. Call or go to ED immediately if symptoms worsen or persist.  Return in about 6 months (around 9/1/2022) for AWV. with above lab prior, or sooner if necessary. Educational materials and/or home exercises printed for patient's review and were included in patient instructions on his/her After Visit Summary and given to patient at the end of visit. Counseled regarding above diagnosis, including possible risks and complications,  especially if left uncontrolled. Counseled regarding the possible side effects, risks, benefits and alternatives to treatment; patient and/or guardian verbalizes understanding, agrees, feels comfortable with and wishes to proceed with above treatment plan. Advised patient to call with any new medication issues, and read all Rx info from pharmacy to assure aware of all possible risks and side effects of medication before taking. Reviewed age and gender appropriate health screening exams and vaccinations. Advised patient regarding importance of keeping up with recommended health maintenance and to schedule as soon as possible if overdue, as this is important in assessing for undiagnosed pathology, especially cancer, as well as protecting against potentially harmful/life threatening disease. Patient and/or guardian verbalizes understanding and agrees with above counseling, assessment and plan. All questions answered.

## 2022-03-25 DIAGNOSIS — I10 ESSENTIAL HYPERTENSION: ICD-10-CM

## 2022-03-25 RX ORDER — LOSARTAN POTASSIUM 50 MG/1
TABLET ORAL
Qty: 90 TABLET | Refills: 1 | Status: SHIPPED
Start: 2022-03-25 | End: 2022-09-19 | Stop reason: SDUPTHER

## 2022-04-04 DIAGNOSIS — F41.1 GENERALIZED ANXIETY DISORDER: ICD-10-CM

## 2022-04-04 DIAGNOSIS — F51.01 PRIMARY INSOMNIA: ICD-10-CM

## 2022-04-04 DIAGNOSIS — E78.2 MIXED HYPERLIPIDEMIA: ICD-10-CM

## 2022-04-04 RX ORDER — ALPRAZOLAM 0.5 MG/1
0.5 TABLET ORAL NIGHTLY PRN
Qty: 30 TABLET | Refills: 1 | Status: SHIPPED
Start: 2022-04-04 | End: 2022-05-31 | Stop reason: SDUPTHER

## 2022-04-04 RX ORDER — ROSUVASTATIN CALCIUM 10 MG/1
10 TABLET, COATED ORAL DAILY
Qty: 90 TABLET | Refills: 1 | Status: SHIPPED
Start: 2022-04-04 | End: 2022-09-28 | Stop reason: SDUPTHER

## 2022-05-31 DIAGNOSIS — F51.01 PRIMARY INSOMNIA: ICD-10-CM

## 2022-05-31 DIAGNOSIS — F41.1 GENERALIZED ANXIETY DISORDER: ICD-10-CM

## 2022-05-31 RX ORDER — ALPRAZOLAM 0.5 MG/1
0.5 TABLET ORAL NIGHTLY PRN
Qty: 30 TABLET | Refills: 1 | Status: SHIPPED
Start: 2022-05-31 | End: 2022-07-28 | Stop reason: SDUPTHER

## 2022-06-27 DIAGNOSIS — F51.01 PRIMARY INSOMNIA: ICD-10-CM

## 2022-06-27 DIAGNOSIS — F41.1 GENERALIZED ANXIETY DISORDER: ICD-10-CM

## 2022-07-21 ENCOUNTER — OFFICE VISIT (OUTPATIENT)
Dept: FAMILY MEDICINE CLINIC | Age: 87
End: 2022-07-21
Payer: MEDICARE

## 2022-07-21 VITALS
HEART RATE: 70 BPM | TEMPERATURE: 97.6 F | OXYGEN SATURATION: 98 % | BODY MASS INDEX: 28.49 KG/M2 | SYSTOLIC BLOOD PRESSURE: 106 MMHG | WEIGHT: 150.8 LBS | DIASTOLIC BLOOD PRESSURE: 62 MMHG

## 2022-07-21 DIAGNOSIS — J30.9 ALLERGIC SHINERS: ICD-10-CM

## 2022-07-21 DIAGNOSIS — H10.13 ALLERGIC CONJUNCTIVITIS, BILATERAL: Primary | ICD-10-CM

## 2022-07-21 PROCEDURE — 1123F ACP DISCUSS/DSCN MKR DOCD: CPT | Performed by: PHYSICIAN ASSISTANT

## 2022-07-21 PROCEDURE — 96372 THER/PROPH/DIAG INJ SC/IM: CPT | Performed by: PHYSICIAN ASSISTANT

## 2022-07-21 PROCEDURE — 99203 OFFICE O/P NEW LOW 30 MIN: CPT | Performed by: PHYSICIAN ASSISTANT

## 2022-07-21 RX ORDER — NAPHAZOLINE HYDROCHLORIDE AND PHENIRAMINE MALEATE .25; 3 MG/ML; MG/ML
1 SOLUTION/ DROPS OPHTHALMIC 4 TIMES DAILY
Qty: 5 ML | Refills: 0 | Status: SHIPPED
Start: 2022-07-21 | End: 2022-08-10 | Stop reason: ALTCHOICE

## 2022-07-21 RX ORDER — METHYLPREDNISOLONE 4 MG/1
TABLET ORAL
Qty: 1 KIT | Refills: 0 | Status: SHIPPED
Start: 2022-07-21 | End: 2022-08-10 | Stop reason: ALTCHOICE

## 2022-07-21 RX ORDER — METHYLPREDNISOLONE ACETATE 40 MG/ML
40 INJECTION, SUSPENSION INTRA-ARTICULAR; INTRALESIONAL; INTRAMUSCULAR; SOFT TISSUE ONCE
Status: COMPLETED | OUTPATIENT
Start: 2022-07-21 | End: 2022-07-21

## 2022-07-21 RX ADMIN — METHYLPREDNISOLONE ACETATE 40 MG: 40 INJECTION, SUSPENSION INTRA-ARTICULAR; INTRALESIONAL; INTRAMUSCULAR; SOFT TISSUE at 12:22

## 2022-07-21 NOTE — PROGRESS NOTES
22  Tiffany Loco : 1935 Sex: female  Age 81 y.o. Subjective:  Chief Complaint   Patient presents with    Eye Problem     Eyes are watery and itchy         HPI:   Tiffany Loco , 81 y.o. female presents to express care for evaluation of watery itchy red eyes    HPI  81-year-old female presents to express care for evaluation of watery itchy eyes. The patient has had the symptoms ongoing for about 2 to 3 days now. The patient states that she has had the symptoms in the past.  The patient is not having any visual disturbances. The patient is not having any matting of the eye lids or eyelashes. The patient denies any diplopia or photophobia. The patient states that they itch. The patient states that she has been outside but does not recall getting into anything abnormal.  The patient is not having any other complaints at this time. ROS:   Unless otherwise stated in this report the patient's positive and negative responses for review of systems for constitutional, eyes, ENT, cardiovascular, respiratory, gastrointestinal, neurological, , musculoskeletal, and integument systems and related systems to the presenting problem are either stated in the history of present illness or were not pertinent or were negative for the symptoms and/or complaints related to the presenting medical problem. Positives and pertinent negatives as per HPI. All others reviewed and are negative. PMH:     Past Medical History:   Diagnosis Date    Aortic stenosis     COPD (chronic obstructive pulmonary disease) (HCC)     Disorder of bone     Elevated hemoglobin A1c     Fatigue     Hyperlipidemia     Hypertension     Smoker     Traumatic ulcer of lower leg, right, with fat layer exposed ([facility] [address] 3/28/2018    Wound of right leg        History reviewed. No pertinent surgical history.     Family History   Problem Relation Age of Onset    No Known Problems Mother     Stroke Father     No Known Problems Brother Medications:     Current Outpatient Medications:     naphazoline-pheniramine (NAPHCON-A) 0.025-0.3 % ophthalmic solution, Place 1 drop into both eyes in the morning and 1 drop at noon and 1 drop in the evening and 1 drop before bedtime. , Disp: 5 mL, Rfl: 0    methylPREDNISolone (MEDROL DOSEPACK) 4 MG tablet, Take by mouth., Disp: 1 kit, Rfl: 0    ALPRAZolam (XANAX) 0.5 MG tablet, Take 1 tablet by mouth nightly as needed for Sleep or Anxiety for up to 60 days. , Disp: 30 tablet, Rfl: 1    rosuvastatin (CRESTOR) 10 MG tablet, Take 1 tablet by mouth daily, Disp: 90 tablet, Rfl: 1    losartan (COZAAR) 50 MG tablet, TAKE ONE TABLET BY MOUTH EVERY DAY, Disp: 90 tablet, Rfl: 1    vitamin B-6 (PYRIDOXINE) 100 MG tablet, Take 100 mg by mouth daily, Disp: , Rfl:     Cholecalciferol (VITAMIN D3) 1000 units CAPS, Take by mouth daily, Disp: , Rfl:     vitamin B-12 (CYANOCOBALAMIN) 1000 MCG tablet, Take 1,000 mcg by mouth daily, Disp: , Rfl:     Allergies:   No Known Allergies    Social History:     Social History     Tobacco Use    Smoking status: Every Day     Packs/day: 0.50     Types: Cigarettes    Smokeless tobacco: Never   Substance Use Topics    Alcohol use: No    Drug use: No       Patient lives at home. Physical Exam:     Vitals:    07/21/22 1155   BP: 106/62   Site: Right Upper Arm   Position: Sitting   Pulse: 70   Temp: 97.6 °F (36.4 °C)   TempSrc: Temporal   SpO2: 98%   Weight: 150 lb 12.8 oz (68.4 kg)       Exam:  Physical Exam  Nurse's notes and vital signs reviewed. The patient is not hypoxic. ? General: Alert, no acute distress, patient resting comfortably Patient is not toxic or lethargic. Skin: Warm, intact, no pallor noted. There is no evidence of rash at this time. Head: Normocephalic, atraumatic  Eye: Bilateral conjunctival injection with evidence of irritation and redness around each of the eyes that seem to be consistent with an allergic shiners.   The patient has no evidence of gross cellulitis, no fluctuance, induration. EOMI, PERRLA, no pain with extraocular eye motion  Ears, Nose, Throat: Right tympanic membrane clear, left tympanic membrane clear. No drainage or discharge noted. No pre- or post-auricular tenderness, erythema, or swelling noted. No rhinorrhea or congestion noted. Posterior oropharynx shows no erythema, tonsillar hypertrophy, or exudate. the uvula is midline. No trismus or drooling is noted. Moist mucous membranes. Neck: No anterior/posterior lymphadenopathy noted. No erythema, no masses, no fluctuance or induration noted. No meningeal signs. Cardiovascular: Regular Rate and Rhythm  Respiratory: No acute distress, no rhonchi, wheezing or crackles noted. No stridor or retractions are noted. Neurological: A&O x4, normal speech  Psychiatric: Cooperative       Testing:           Medical Decision Making:     Vital signs reviewed    Past medical history reviewed. Allergies reviewed. Medications reviewed. Patient on arrival does not appear to be in any apparent distress or discomfort. The patient has been seen and evaluated. The patient does not appear to be toxic or lethargic. The patient will be treated with intramuscular injection of methylprednisone. The patient will be given a Medrol Dosepak. The patient will be given Naphcon-A. The patient is to monitor symptoms closely. Use cool compresses. Try to stay indoors. The patient will monitor symptoms closely. The patient is to return to express care or go directly to the emergency department should any of the signs or symptoms worsen. The patient is to followup with primary care physician in 2-3 days for repeat evaluation. The patient has no other questions or concerns at this time the patient will be discharged home. Clinical Impression:   Tee Pelletier was seen today for eye problem.     Diagnoses and all orders for this visit:    Allergic conjunctivitis, bilateral    Allergic shiners    Other orders  - naphazoline-pheniramine (NAPHCON-A) 0.025-0.3 % ophthalmic solution; Place 1 drop into both eyes in the morning and 1 drop at noon and 1 drop in the evening and 1 drop before bedtime.  -     methylPREDNISolone (MEDROL DOSEPACK) 4 MG tablet; Take by mouth. -     methylPREDNISolone acetate (DEPO-MEDROL) injection 40 mg      The patient is to call for any concerns or return if any of the signs or symptoms worsen. The patient is to follow-up with PCP in the next 2-3 days for repeat evaluation repeat assessment or go directly to the emergency department.      SIGNATURE: David Walters III, PA-C

## 2022-07-28 DIAGNOSIS — F51.01 PRIMARY INSOMNIA: ICD-10-CM

## 2022-07-28 DIAGNOSIS — F41.1 GENERALIZED ANXIETY DISORDER: ICD-10-CM

## 2022-07-28 RX ORDER — ALPRAZOLAM 0.5 MG/1
0.5 TABLET ORAL NIGHTLY PRN
Qty: 30 TABLET | Refills: 1 | Status: SHIPPED
Start: 2022-07-28 | End: 2022-08-29 | Stop reason: SDUPTHER

## 2022-08-05 ENCOUNTER — OFFICE VISIT (OUTPATIENT)
Dept: FAMILY MEDICINE CLINIC | Age: 87
End: 2022-08-05
Payer: MEDICARE

## 2022-08-05 VITALS
TEMPERATURE: 97 F | WEIGHT: 143 LBS | BODY MASS INDEX: 27.02 KG/M2 | SYSTOLIC BLOOD PRESSURE: 110 MMHG | OXYGEN SATURATION: 97 % | HEART RATE: 52 BPM | DIASTOLIC BLOOD PRESSURE: 78 MMHG

## 2022-08-05 DIAGNOSIS — H01.00A BLEPHARITIS OF UPPER AND LOWER EYELIDS OF BOTH EYES, UNSPECIFIED TYPE: Primary | ICD-10-CM

## 2022-08-05 DIAGNOSIS — H01.00B BLEPHARITIS OF UPPER AND LOWER EYELIDS OF BOTH EYES, UNSPECIFIED TYPE: Primary | ICD-10-CM

## 2022-08-05 DIAGNOSIS — J30.9 ALLERGIC SHINERS: ICD-10-CM

## 2022-08-05 DIAGNOSIS — H10.13 ALLERGIC CONJUNCTIVITIS, BILATERAL: ICD-10-CM

## 2022-08-05 PROCEDURE — 1123F ACP DISCUSS/DSCN MKR DOCD: CPT | Performed by: PHYSICIAN ASSISTANT

## 2022-08-05 PROCEDURE — 99213 OFFICE O/P EST LOW 20 MIN: CPT | Performed by: PHYSICIAN ASSISTANT

## 2022-08-05 RX ORDER — PREDNISONE 10 MG/1
TABLET ORAL
Qty: 18 TABLET | Refills: 0 | Status: SHIPPED
Start: 2022-08-05 | End: 2022-10-04 | Stop reason: ALTCHOICE

## 2022-08-05 RX ORDER — ERYTHROMYCIN 5 MG/G
OINTMENT OPHTHALMIC
Qty: 3.5 G | Refills: 0 | Status: SHIPPED
Start: 2022-08-05 | End: 2022-10-04 | Stop reason: ALTCHOICE

## 2022-08-05 NOTE — PROGRESS NOTES
22  Al Roger : 1935 Sex: female  Age 81 y.o. Subjective:  Chief Complaint   Patient presents with    Eye Problem     Puffiness under both eyes. HPI:   Al Roger , 81 y.o. female presents to express care for evaluation of eye irritation, swelling    HPI  45-year-old female presents to express care for evaluation of watery itchy eyes. The patient has noted some increased irritation and redness around the eyes initially it was just the right eye and she was seen and evaluated on 2022. The patient was given IM injection of methylprednisone, Medrol Dosepak, and Naphcon a. The patient states that the eyedrops did not really seem to help but the pills seemed to help her immensely. The patient states that when she stopped the Medrol Dosepak the symptoms returned and now seems to be going to the left eye. She denies any photophobia, diplopia. The patient denies any traumatic injury. She not having any matting of the upper or lower eyelashes      ROS:   Unless otherwise stated in this report the patient's positive and negative responses for review of systems for constitutional, eyes, ENT, cardiovascular, respiratory, gastrointestinal, neurological, , musculoskeletal, and integument systems and related systems to the presenting problem are either stated in the history of present illness or were not pertinent or were negative for the symptoms and/or complaints related to the presenting medical problem. Positives and pertinent negatives as per HPI. All others reviewed and are negative. PMH:     Past Medical History:   Diagnosis Date    Aortic stenosis     COPD (chronic obstructive pulmonary disease) (HCC)     Disorder of bone     Elevated hemoglobin A1c     Fatigue     Hyperlipidemia     Hypertension     Smoker     Traumatic ulcer of lower leg, right, with fat layer exposed ([address] 3/28/2018    Wound of right leg        History reviewed.  No pertinent surgical history. Family History   Problem Relation Age of Onset    No Known Problems Mother     Stroke Father     No Known Problems Brother        Medications:     Current Outpatient Medications:     predniSONE (DELTASONE) 10 MG tablet, 3 tabs once daily for 3 days, 2 tabs once daily for 3 days, 1 tab once daily for 3 days, Disp: 18 tablet, Rfl: 0    erythromycin (ROMYCIN) 5 MG/GM ophthalmic ointment, Apply to the upper and lower eyelids for the next 10 days, Disp: 3.5 g, Rfl: 0    ALPRAZolam (XANAX) 0.5 MG tablet, Take 1 tablet by mouth nightly as needed for Sleep or Anxiety for up to 60 days. , Disp: 30 tablet, Rfl: 1    naphazoline-pheniramine (NAPHCON-A) 0.025-0.3 % ophthalmic solution, Place 1 drop into both eyes in the morning and 1 drop at noon and 1 drop in the evening and 1 drop before bedtime. , Disp: 5 mL, Rfl: 0    rosuvastatin (CRESTOR) 10 MG tablet, Take 1 tablet by mouth daily, Disp: 90 tablet, Rfl: 1    losartan (COZAAR) 50 MG tablet, TAKE ONE TABLET BY MOUTH EVERY DAY, Disp: 90 tablet, Rfl: 1    vitamin B-6 (PYRIDOXINE) 100 MG tablet, Take 100 mg by mouth daily, Disp: , Rfl:     Cholecalciferol (VITAMIN D3) 1000 units CAPS, Take by mouth daily, Disp: , Rfl:     vitamin B-12 (CYANOCOBALAMIN) 1000 MCG tablet, Take 1,000 mcg by mouth daily, Disp: , Rfl:     methylPREDNISolone (MEDROL DOSEPACK) 4 MG tablet, Take by mouth. (Patient not taking: Reported on 8/5/2022), Disp: 1 kit, Rfl: 0    Allergies:   No Known Allergies    Social History:     Social History     Tobacco Use    Smoking status: Every Day     Packs/day: 0.50     Types: Cigarettes    Smokeless tobacco: Never   Substance Use Topics    Alcohol use: No    Drug use: No       Patient lives at home. Physical Exam:     Vitals:    08/05/22 1229   BP: 110/78   Pulse: 52   Temp: 97 °F (36.1 °C)   SpO2: 97%   Weight: 143 lb (64.9 kg)       Exam:  Physical Exam  Nurse's notes and vital signs reviewed. The patient is not hypoxic. ?   General: Alert, no acute distress, patient resting comfortably Patient is not toxic or lethargic. Skin: Warm, intact, no pallor noted. There is no evidence of rash at this time. Head: Normocephalic, atraumatic  Eye: Bilateral conjunctival injection with evidence of irritation and redness around each of the eyes that seem to be consistent with an allergic shiners. The patient has no evidence of gross cellulitis, no fluctuance, induration. EOMI, PERRLA, no pain with extraocular eye motion  Ears, Nose, Throat: Right tympanic membrane clear, left tympanic membrane clear. No drainage or discharge noted. No pre- or post-auricular tenderness, erythema, or swelling noted. No rhinorrhea or congestion noted. Posterior oropharynx shows no erythema, tonsillar hypertrophy, or exudate. the uvula is midline. No trismus or drooling is noted. Moist mucous membranes. Neck: No anterior/posterior lymphadenopathy noted. No erythema, no masses, no fluctuance or induration noted. No meningeal signs. Cardiovascular: Regular Rate and Rhythm  Respiratory: No acute distress, no rhonchi, wheezing or crackles noted. No stridor or retractions are noted. Neurological: A&O x4, normal speech  Psychiatric: Cooperative     Testing:           Medical Decision Making:     Vital signs reviewed    Past medical history reviewed. Allergies reviewed. Medications reviewed. Patient on arrival does not appear to be in any apparent distress or discomfort. The patient has been seen and evaluated. The patient does not appear to be toxic or lethargic. The patient will be treated with prednisone and erythromycin ophthalmic ointment. We will have the patient follow-up with PCP. Patient may ultimately need to follow-up with dermatology as well. The patient is to return to express care or go directly to the emergency department should any of the signs or symptoms worsen. The patient is to followup with primary care physician in 2-3 days for repeat evaluation.  The patient has no other questions or concerns at this time the patient will be discharged home. Clinical Impression:   Jeanie Vela was seen today for eye problem. Diagnoses and all orders for this visit:    Blepharitis of upper and lower eyelids of both eyes, unspecified type    Allergic shiners    Allergic conjunctivitis, bilateral    Other orders  -     predniSONE (DELTASONE) 10 MG tablet; 3 tabs once daily for 3 days, 2 tabs once daily for 3 days, 1 tab once daily for 3 days  -     erythromycin (ROMYCIN) 5 MG/GM ophthalmic ointment; Apply to the upper and lower eyelids for the next 10 days      The patient is to call for any concerns or return if any of the signs or symptoms worsen. The patient is to follow-up with PCP in the next 2-3 days for repeat evaluation repeat assessment or go directly to the emergency department.      SIGNATURE: Hi Toledo III, PA-C

## 2022-08-08 ENCOUNTER — TELEPHONE (OUTPATIENT)
Dept: FAMILY MEDICINE CLINIC | Age: 87
End: 2022-08-08

## 2022-08-08 NOTE — TELEPHONE ENCOUNTER
Pt is wondering if she can get a referral for eye dr- her eyes are red and puffy and thinks they are infected. Has taken prednisone 10mg and an ointment (erythromycin 5mg) that she puts on top and bottom lids, both eyes. Says this has been going on for a couple weeks, states she has gone to urgent care twice and they gave her pills first and then second time they gave prednisone and ointment. Scheduled pt appt with Dr Marino Oliver tomorrow morning to be evaluated.

## 2022-08-09 ENCOUNTER — OFFICE VISIT (OUTPATIENT)
Dept: FAMILY MEDICINE CLINIC | Age: 87
End: 2022-08-09
Payer: MEDICARE

## 2022-08-09 VITALS
HEIGHT: 61 IN | HEART RATE: 50 BPM | BODY MASS INDEX: 27.15 KG/M2 | WEIGHT: 143.8 LBS | TEMPERATURE: 97.5 F | DIASTOLIC BLOOD PRESSURE: 68 MMHG | SYSTOLIC BLOOD PRESSURE: 138 MMHG

## 2022-08-09 DIAGNOSIS — J30.9 ALLERGIC RHINITIS, UNSPECIFIED SEASONALITY, UNSPECIFIED TRIGGER: ICD-10-CM

## 2022-08-09 DIAGNOSIS — H57.89 ITCHY, WATERY, AND RED EYE: Primary | ICD-10-CM

## 2022-08-09 PROCEDURE — 99213 OFFICE O/P EST LOW 20 MIN: CPT | Performed by: FAMILY MEDICINE

## 2022-08-09 PROCEDURE — 1123F ACP DISCUSS/DSCN MKR DOCD: CPT | Performed by: FAMILY MEDICINE

## 2022-08-09 RX ORDER — CETIRIZINE HYDROCHLORIDE 10 MG/1
10 TABLET ORAL DAILY
Qty: 30 TABLET | Refills: 2 | Status: SHIPPED
Start: 2022-08-09 | End: 2022-09-06 | Stop reason: ALTCHOICE

## 2022-08-09 RX ORDER — FLUTICASONE PROPIONATE 50 MCG
1 SPRAY, SUSPENSION (ML) NASAL DAILY
Qty: 32 G | Refills: 2 | Status: SHIPPED
Start: 2022-08-09 | End: 2022-10-04 | Stop reason: ALTCHOICE

## 2022-08-09 RX ORDER — OLOPATADINE HYDROCHLORIDE 1 MG/ML
1 SOLUTION/ DROPS OPHTHALMIC 2 TIMES DAILY
Qty: 5 ML | Refills: 2 | Status: SHIPPED | OUTPATIENT
Start: 2022-08-09 | End: 2022-09-08

## 2022-08-09 SDOH — ECONOMIC STABILITY: FOOD INSECURITY: WITHIN THE PAST 12 MONTHS, THE FOOD YOU BOUGHT JUST DIDN'T LAST AND YOU DIDN'T HAVE MONEY TO GET MORE.: NEVER TRUE

## 2022-08-09 SDOH — ECONOMIC STABILITY: FOOD INSECURITY: WITHIN THE PAST 12 MONTHS, YOU WORRIED THAT YOUR FOOD WOULD RUN OUT BEFORE YOU GOT MONEY TO BUY MORE.: NEVER TRUE

## 2022-08-09 ASSESSMENT — PATIENT HEALTH QUESTIONNAIRE - PHQ9
SUM OF ALL RESPONSES TO PHQ QUESTIONS 1-9: 0
2. FEELING DOWN, DEPRESSED OR HOPELESS: 0
1. LITTLE INTEREST OR PLEASURE IN DOING THINGS: 0
SUM OF ALL RESPONSES TO PHQ QUESTIONS 1-9: 0
SUM OF ALL RESPONSES TO PHQ9 QUESTIONS 1 & 2: 0
SUM OF ALL RESPONSES TO PHQ QUESTIONS 1-9: 0
SUM OF ALL RESPONSES TO PHQ QUESTIONS 1-9: 0

## 2022-08-09 ASSESSMENT — SOCIAL DETERMINANTS OF HEALTH (SDOH): HOW HARD IS IT FOR YOU TO PAY FOR THE VERY BASICS LIKE FOOD, HOUSING, MEDICAL CARE, AND HEATING?: NOT HARD AT ALL

## 2022-08-09 NOTE — PROGRESS NOTES
Eyes red and swollen for 3 weeks  Was treated in the ED with steroids and erythromycin ointment  Examination  Blood pressure 138/68, pulse 50, temperature 97.5 °F (36.4 °C), temperature source Temporal, height 5' 1\" (1.549 m), weight 143 lb 12.8 oz (65.2 kg), not currently breastfeeding. Eyes appear normal  Edema of the eyelids  Nasal mucosa edematous  A/P  Allergic reaction  Azelastine  Flonase  Attending Physician Statement  I have discussed the case, including pertinent history and exam findings with the resident. I agree with the documented assessment and plan.

## 2022-08-09 NOTE — PROGRESS NOTES
[facility]  Department of Family Medicine  Family Medicine Residency Program      Patient:  Divya Snow 81 y.o. female  Date of Service: 8/9/22      Chief complaint:   Chief Complaint   Patient presents with    Eye Problem     Red and puffy          History ofPresent Illness   Divya Snow is a 81 y.o. female who presents to the clinic with complaints as above. Bags Under Eyes, Itchy Eyes  Since about 7/21  Noted itchy, watery, red eyes, bags under eyes that she normally doesn't have  Seen in urgent care twice, given allergic eye drops, steroid burst, then another steroid burst and erythromycin eye ointment; steroids helped, still using eye ointment which helps a little  No pain in the eyes or under the eyes  Hasn't seen an eye doc yet for this  Very bothered by this  No systemic symptoms    Past Medical History:      Diagnosis Date    Aortic stenosis     COPD (chronic obstructive pulmonary disease) (HCC)     Disorder of bone     Elevated hemoglobin A1c     Fatigue     Hyperlipidemia     Hypertension     Smoker     Traumatic ulcer of lower leg, right, with fat layer exposed ([address] 3/28/2018    Wound of right leg        Past Surgical History:    History reviewed. No pertinent surgical history. Allergies:    Patient has no known allergies. Social History:   Social History     Socioeconomic History    Marital status:       Spouse name: Not on file    Number of children: Not on file    Years of education: Not on file    Highest education level: Not on file   Occupational History    Not on file   Tobacco Use    Smoking status: Every Day     Packs/day: 0.50     Types: Cigarettes    Smokeless tobacco: Never   Substance and Sexual Activity    Alcohol use: No    Drug use: No    Sexual activity: Not Currently   Other Topics Concern    Not on file   Social History Narrative    Not on file     Social Determinants of Health     Financial Resource Strain: Low Risk     Difficulty of Paying Living Expenses: Not hard at all   Food Insecurity: No Food Insecurity    Worried About Running Out of Food in the Last Year: Never true    Ran Out of Food in the Last Year: Never true   Transportation Needs: Not on file   Physical Activity: Not on file   Stress: Not on file   Social Connections: Not on file   Intimate Partner Violence: Not on file   Housing Stability: Not on file        Family History:       Problem Relation Age of Onset    No Known Problems Mother     Stroke Father     No Known Problems Brother        Medication List:    Current Outpatient Medications   Medication Sig Dispense Refill    fluticasone (FLONASE) 50 MCG/ACT nasal spray 1 spray by Each Nostril route in the morning. 32 g 2    cetirizine (ZYRTEC) 10 MG tablet Take 1 tablet by mouth in the morning. 30 tablet 2    olopatadine (PATANOL) 0.1 % ophthalmic solution Place 1 drop into both eyes in the morning and 1 drop before bedtime. 5 mL 2    predniSONE (DELTASONE) 10 MG tablet 3 tabs once daily for 3 days, 2 tabs once daily for 3 days, 1 tab once daily for 3 days 18 tablet 0    erythromycin (ROMYCIN) 5 MG/GM ophthalmic ointment Apply to the upper and lower eyelids for the next 10 days 3.5 g 0    ALPRAZolam (XANAX) 0.5 MG tablet Take 1 tablet by mouth nightly as needed for Sleep or Anxiety for up to 60 days. 30 tablet 1    rosuvastatin (CRESTOR) 10 MG tablet Take 1 tablet by mouth daily 90 tablet 1    losartan (COZAAR) 50 MG tablet TAKE ONE TABLET BY MOUTH EVERY DAY 90 tablet 1    vitamin B-6 (PYRIDOXINE) 100 MG tablet Take 100 mg by mouth daily      Cholecalciferol (VITAMIN D3) 1000 units CAPS Take by mouth daily      vitamin B-12 (CYANOCOBALAMIN) 1000 MCG tablet Take 1,000 mcg by mouth daily       No current facility-administered medications for this visit.          Review of Systems:   Review of Systems as per HPI    Physical Exam   Vitals: /68   Pulse 50   Temp 97.5 °F (36.4 °C) (Temporal)   Ht 5' 1\" (1.549 m)   Wt 143 lb 12.8 oz (65.2 kg)   BMI 27.17 kg/m²   Physical Exam  Vitals and nursing note reviewed. Constitutional:       General: She is not in acute distress. Appearance: Normal appearance. HENT:      Head: Normocephalic and atraumatic. Right Ear: Tympanic membrane normal.      Left Ear: Tympanic membrane normal.      Nose: Congestion and rhinorrhea present. Mouth/Throat:      Mouth: Mucous membranes are moist.      Pharynx: Oropharynx is clear. Eyes:      General: No scleral icterus. Right eye: No discharge. Left eye: No discharge. Extraocular Movements: Extraocular movements intact. Conjunctiva/sclera: Conjunctivae normal.      Pupils: Pupils are equal, round, and reactive to light. Comments: Edema under bilateral eyelids without overlying skin rash or changes, with the left more edematous/larger than the right. Not TTP. Cardiovascular:      Rate and Rhythm: Normal rate and regular rhythm. Heart sounds: No murmur heard. Pulmonary:      Effort: Pulmonary effort is normal.      Breath sounds: Normal breath sounds. No wheezing. Abdominal:      General: Bowel sounds are normal.      Palpations: Abdomen is soft. Musculoskeletal:         General: Normal range of motion. Cervical back: Normal range of motion. No rigidity. Right lower leg: No edema. Left lower leg: No edema. Skin:     General: Skin is warm and dry. Neurological:      Mental Status: She is alert and oriented to person, place, and time. Mental status is at baseline. Assessment and Plan     1. Itchy, watery, and red eye  No signs of infection  Trial flonase, zyrtec, and patanol for allergic eye drop  FU in about 1 month  - fluticasone (FLONASE) 50 MCG/ACT nasal spray; 1 spray by Each Nostril route in the morning. Dispense: 32 g; Refill: 2  - cetirizine (ZYRTEC) 10 MG tablet; Take 1 tablet by mouth in the morning. Dispense: 30 tablet;  Refill: 2  - olopatadine (PATANOL) 0.1 % ophthalmic solution; Place 1 drop into both eyes in the morning and 1 drop before bedtime. Dispense: 5 mL; Refill: 2    2. Allergic rhinitis, unspecified seasonality, unspecified trigger  As above  - fluticasone (FLONASE) 50 MCG/ACT nasal spray; 1 spray by Each Nostril route in the morning. Dispense: 32 g; Refill: 2  - cetirizine (ZYRTEC) 10 MG tablet; Take 1 tablet by mouth in the morning. Dispense: 30 tablet; Refill: 2  - olopatadine (PATANOL) 0.1 % ophthalmic solution; Place 1 drop into both eyes in the morning and 1 drop before bedtime. Dispense: 5 mL; Refill: 2    Counseled regarding above diagnosis, including possible risks and complications, especially if left uncontrolled. Counseled regarding the possible side effects, risks, benefits and alternatives to treatment; patient and/or guardian verbalizes understanding, agrees, feels comfortable with, and wishes to proceed with above treatment plan. Call or go to ED immediately if symptoms worsen or persist. Advised patient to call with any new medication issues and, as applicable, read all Rx info from pharmacy to assure aware of all possible risks and side effects of medication before taking. Patient and/or guardian given opportunity to ask questions/raise concerns. The patient verbalized comfort and understanding of instructions. I encourage further reading and education about your health conditions. Information on many health conditions is provided by the American Academy of Family Physicians: https://familydoctor. org/  Please bring any questions to me at your next visit. Return to Office: Return in about 4 weeks (around 9/6/2022) for FU eye problem.     Cathy Gomes,

## 2022-08-10 PROBLEM — H57.9 ITCHY, WATERY, AND RED EYE: Status: ACTIVE | Noted: 2022-08-10

## 2022-08-10 PROBLEM — H57.89 ITCHY, WATERY, AND RED EYE: Status: ACTIVE | Noted: 2022-08-10

## 2022-08-10 PROBLEM — J30.9 ALLERGIC RHINITIS: Status: ACTIVE | Noted: 2022-08-10

## 2022-08-29 ENCOUNTER — TELEPHONE (OUTPATIENT)
Dept: FAMILY MEDICINE CLINIC | Age: 87
End: 2022-08-29

## 2022-08-29 DIAGNOSIS — F51.01 PRIMARY INSOMNIA: ICD-10-CM

## 2022-08-29 DIAGNOSIS — F41.1 GENERALIZED ANXIETY DISORDER: ICD-10-CM

## 2022-08-29 RX ORDER — ALPRAZOLAM 0.5 MG/1
0.5 TABLET ORAL NIGHTLY PRN
Qty: 30 TABLET | Refills: 1 | Status: SHIPPED
Start: 2022-08-29 | End: 2022-10-24 | Stop reason: SDUPTHER

## 2022-08-31 ENCOUNTER — HOSPITAL ENCOUNTER (OUTPATIENT)
Age: 87
Discharge: HOME | End: 2022-08-31
Payer: MEDICARE

## 2022-08-31 DIAGNOSIS — N18.31 STAGE 3A CHRONIC KIDNEY DISEASE (HCC): ICD-10-CM

## 2022-08-31 DIAGNOSIS — E78.2 MIXED HYPERLIPIDEMIA: ICD-10-CM

## 2022-08-31 LAB
ALBUMIN SERPL-MCNC: 4.2 G/DL (ref 3.5–5.2)
ALP BLD-CCNC: 156 U/L (ref 35–104)
ALT SERPL-CCNC: 17 U/L (ref 0–32)
ANION GAP SERPL CALCULATED.3IONS-SCNC: 15 MMOL/L (ref 7–16)
AST SERPL-CCNC: 24 U/L (ref 0–31)
BASOPHILS ABSOLUTE: 0.04 E9/L (ref 0–0.2)
BASOPHILS RELATIVE PERCENT: 0.4 % (ref 0–2)
BILIRUB SERPL-MCNC: 0.5 MG/DL (ref 0–1.2)
BUN BLDV-MCNC: 24 MG/DL (ref 6–23)
CALCIUM SERPL-MCNC: 9.3 MG/DL (ref 8.6–10.2)
CHLORIDE BLD-SCNC: 102 MMOL/L (ref 98–107)
CHOLESTEROL, TOTAL: 110 MG/DL (ref 0–199)
CO2: 23 MMOL/L (ref 22–29)
CREAT SERPL-MCNC: 1.3 MG/DL (ref 0.5–1)
CREATININE URINE: 221 MG/DL (ref 29–226)
EOSINOPHILS ABSOLUTE: 0 E9/L (ref 0.05–0.5)
EOSINOPHILS RELATIVE PERCENT: 0 % (ref 0–6)
GFR AFRICAN AMERICAN: 47
GFR NON-AFRICAN AMERICAN: 39 ML/MIN/1.73
GLUCOSE BLD-MCNC: 85 MG/DL (ref 74–99)
HCT VFR BLD CALC: 43.3 % (ref 34–48)
HDLC SERPL-MCNC: 54 MG/DL
HEMOGLOBIN: 14 G/DL (ref 11.5–15.5)
IMMATURE GRANULOCYTES #: 0.01 E9/L
IMMATURE GRANULOCYTES %: 0.1 % (ref 0–5)
LDL CHOLESTEROL CALCULATED: 37 MG/DL (ref 0–99)
LYMPHOCYTES ABSOLUTE: 1.53 E9/L (ref 1.5–4)
LYMPHOCYTES RELATIVE PERCENT: 16.2 % (ref 20–42)
MAGNESIUM: 1.9 MG/DL (ref 1.6–2.6)
MCH RBC QN AUTO: 32.5 PG (ref 26–35)
MCHC RBC AUTO-ENTMCNC: 32.3 % (ref 32–34.5)
MCV RBC AUTO: 100.5 FL (ref 80–99.9)
MICROALBUMIN UR-MCNC: 178.3 MG/L
MICROALBUMIN/CREAT UR-RTO: 80.7 (ref 0–30)
MONOCYTES ABSOLUTE: 0.6 E9/L (ref 0.1–0.95)
MONOCYTES RELATIVE PERCENT: 6.4 % (ref 2–12)
NEUTROPHILS ABSOLUTE: 7.24 E9/L (ref 1.8–7.3)
NEUTROPHILS RELATIVE PERCENT: 76.9 % (ref 43–80)
PARATHYROID HORMONE INTACT: 48 PG/ML (ref 15–65)
PDW BLD-RTO: 12.2 FL (ref 11.5–15)
PHOSPHORUS: 3.6 MG/DL (ref 2.5–4.5)
PLATELET # BLD: 271 E9/L (ref 130–450)
PMV BLD AUTO: 12.9 FL (ref 7–12)
POTASSIUM SERPL-SCNC: 4 MMOL/L (ref 3.5–5)
RBC # BLD: 4.31 E12/L (ref 3.5–5.5)
SODIUM BLD-SCNC: 140 MMOL/L (ref 132–146)
TOTAL PROTEIN: 7.3 G/DL (ref 6.4–8.3)
TRIGL SERPL-MCNC: 94 MG/DL (ref 0–149)
URIC ACID, SERUM: 4.8 MG/DL (ref 2.4–5.7)
VITAMIN D 25-HYDROXY: 59 NG/ML (ref 30–100)
VLDLC SERPL CALC-MCNC: 19 MG/DL
WBC # BLD: 9.4 E9/L (ref 4.5–11.5)

## 2022-08-31 PROCEDURE — 82570 ASSAY OF URINE CREATININE: CPT

## 2022-08-31 PROCEDURE — 36415 COLL VENOUS BLD VENIPUNCTURE: CPT

## 2022-08-31 PROCEDURE — 82306 VITAMIN D 25 HYDROXY: CPT

## 2022-08-31 PROCEDURE — 85025 COMPLETE CBC W/AUTO DIFF WBC: CPT

## 2022-08-31 PROCEDURE — 82044 UR ALBUMIN SEMIQUANTITATIVE: CPT

## 2022-08-31 PROCEDURE — 80053 COMPREHEN METABOLIC PANEL: CPT

## 2022-08-31 PROCEDURE — 83970 ASSAY OF PARATHORMONE: CPT

## 2022-08-31 PROCEDURE — 84100 ASSAY OF PHOSPHORUS: CPT

## 2022-08-31 PROCEDURE — 84550 ASSAY OF BLOOD/URIC ACID: CPT

## 2022-08-31 PROCEDURE — 83735 ASSAY OF MAGNESIUM: CPT

## 2022-08-31 PROCEDURE — 80061 LIPID PANEL: CPT

## 2022-09-06 ENCOUNTER — OFFICE VISIT (OUTPATIENT)
Dept: FAMILY MEDICINE CLINIC | Age: 87
End: 2022-09-06
Payer: MEDICARE

## 2022-09-06 VITALS
SYSTOLIC BLOOD PRESSURE: 118 MMHG | DIASTOLIC BLOOD PRESSURE: 72 MMHG | WEIGHT: 137 LBS | HEIGHT: 61 IN | HEART RATE: 52 BPM | TEMPERATURE: 97.7 F | BODY MASS INDEX: 25.86 KG/M2 | OXYGEN SATURATION: 94 % | RESPIRATION RATE: 14 BRPM

## 2022-09-06 DIAGNOSIS — F41.1 GENERALIZED ANXIETY DISORDER: ICD-10-CM

## 2022-09-06 DIAGNOSIS — F51.01 PRIMARY INSOMNIA: ICD-10-CM

## 2022-09-06 DIAGNOSIS — R63.4 WEIGHT LOSS: ICD-10-CM

## 2022-09-06 DIAGNOSIS — H57.89 ITCHY, WATERY, AND RED EYE: ICD-10-CM

## 2022-09-06 DIAGNOSIS — Z00.00 MEDICARE ANNUAL WELLNESS VISIT, SUBSEQUENT: Primary | ICD-10-CM

## 2022-09-06 DIAGNOSIS — E78.2 MIXED HYPERLIPIDEMIA: ICD-10-CM

## 2022-09-06 DIAGNOSIS — N18.31 STAGE 3A CHRONIC KIDNEY DISEASE (HCC): ICD-10-CM

## 2022-09-06 PROCEDURE — 1123F ACP DISCUSS/DSCN MKR DOCD: CPT | Performed by: FAMILY MEDICINE

## 2022-09-06 PROCEDURE — G0439 PPPS, SUBSEQ VISIT: HCPCS | Performed by: FAMILY MEDICINE

## 2022-09-06 RX ORDER — NAPHAZOLINE HYDROCHLORIDE AND PHENIRAMINE MALEATE .25; 3 MG/ML; MG/ML
1 SOLUTION/ DROPS OPHTHALMIC DAILY
COMMUNITY
End: 2022-10-04 | Stop reason: ALTCHOICE

## 2022-09-06 ASSESSMENT — PATIENT HEALTH QUESTIONNAIRE - PHQ9
2. FEELING DOWN, DEPRESSED OR HOPELESS: 0
SUM OF ALL RESPONSES TO PHQ9 QUESTIONS 1 & 2: 0
SUM OF ALL RESPONSES TO PHQ QUESTIONS 1-9: 0
1. LITTLE INTEREST OR PLEASURE IN DOING THINGS: 0
SUM OF ALL RESPONSES TO PHQ QUESTIONS 1-9: 0

## 2022-09-06 ASSESSMENT — LIFESTYLE VARIABLES
HOW OFTEN DO YOU HAVE A DRINK CONTAINING ALCOHOL: NEVER
HOW MANY STANDARD DRINKS CONTAINING ALCOHOL DO YOU HAVE ON A TYPICAL DAY: PATIENT DOES NOT DRINK

## 2022-09-06 NOTE — PATIENT INSTRUCTIONS
Make an appointment with your eye doctor. Personalized Preventive Plan for Divya Snow - 9/6/2022  Medicare offers a range of preventive health benefits. Some of the tests and screenings are paid in full while other may be subject to a deductible, co-insurance, and/or copay. Some of these benefits include a comprehensive review of your medical history including lifestyle, illnesses that may run in your family, and various assessments and screenings as appropriate. After reviewing your medical record and screening and assessments performed today your provider may have ordered immunizations, labs, imaging, and/or referrals for you. A list of these orders (if applicable) as well as your Preventive Care list are included within your After Visit Summary for your review. Other Preventive Recommendations:    A preventive eye exam performed by an eye specialist is recommended every 1-2 years to screen for glaucoma; cataracts, macular degeneration, and other eye disorders. A preventive dental visit is recommended every 6 months. Try to get at least 150 minutes of exercise per week or 10,000 steps per day on a pedometer . Order or download the FREE \"Exercise & Physical Activity: Your Everyday Guide\" from [employer]. Call 0-197.485.6341 or search [employer] online. You need 8776-0096 mg of calcium and 7796-5189 IU of vitamin D per day. It is possible to meet your calcium requirement with diet alone, but a vitamin D supplement is usually necessary to meet this goal.  When exposed to the sun, use a sunscreen that protects against both UVA and UVB radiation with an SPF of 30 or greater. Reapply every 2 to 3 hours or after sweating, drying off with a towel, or swimming. Always wear a seat belt when traveling in a car. Always wear a helmet when riding a bicycle or motorcycle.

## 2022-09-07 ENCOUNTER — CLINICAL DOCUMENTATION (OUTPATIENT)
Dept: SPIRITUAL SERVICES | Age: 87
End: 2022-09-07

## 2022-09-07 NOTE — ACP (ADVANCE CARE PLANNING)
Advance Care Planning   Ambulatory ACP Specialist Patient Outreach    Date:  9/7/2022  ACP Specialist:  Jaden Durant    Outreach call to patient in follow-up to ACP Specialist referral from: Jorje Romberg, MD    [x] PCP  [] Provider   [] Ambulatory Care Management [] Other for Reason:    [x] Advance Directive Assistance  [] Code Status Discussion  [] Complete Portable DNR Order  [] Discuss Goals of Care  [] Complete POST/MOST  [] Early ACP Decision-Making  [] Other    Date Referral Received:9/6/22    Today's Outreach:  [x] First   [] Second  [] Third                               Third outreach made by []  phone  [] email []   [employer]     Intervention:  [] Spoke with Patient  [x] Left VM requesting return call      Outcome: I left a voice message for Patricia Franklin and am mailing documents. I will continue to follow up. Patricia Franklin returned my call and stated she spoke with her daughter who says she already has the documents. I will close referral.   Next Step:   [] ACP scheduled conversation  [] Outreach again in one week               [x] Email / Mail ACP Info Sheets  [x] Email / Mail Advance Directive            [] Close Referral. Routing closure to referring provider/staff and to ACP Specialist . [] Closure Letter mailed to Patient with Invitation to Contact ACP Specialist if/when ready.     Thank you for this referral.

## 2022-09-19 ENCOUNTER — TELEPHONE (OUTPATIENT)
Dept: FAMILY MEDICINE CLINIC | Age: 87
End: 2022-09-19

## 2022-09-19 DIAGNOSIS — I10 ESSENTIAL HYPERTENSION: ICD-10-CM

## 2022-09-19 RX ORDER — LOSARTAN POTASSIUM 50 MG/1
TABLET ORAL
Qty: 90 TABLET | Refills: 1 | Status: SHIPPED | OUTPATIENT
Start: 2022-09-19

## 2022-09-19 NOTE — TELEPHONE ENCOUNTER
Patient has an eye infection that she went to [facility] for. She was given Patidan and Manny and Certrizine once daily but she is still experiencing watery eyes and swelling under the eyes.      Refill needed on     Losartan 50 mg at  on [address]

## 2022-09-19 NOTE — TELEPHONE ENCOUNTER
Do recommend she f/u with the eye specialists so they can reevaluate and change their management. They should be able to help her the best! Thanks.

## 2022-09-19 NOTE — TELEPHONE ENCOUNTER
(Refill request sent separately)    Spoke with patient who states she saw the eye doctor last week and was prescribed the Patanol and Ivizia drops but neither one seems to be helping much. Also confirmed she is taking Zyrtec and Naphcon drops. She used Erthythromycin ointment for 10 days and felt that seemed to help more than anything else. Eyes are still watery/itchy and eyelids are swollen and drooping.

## 2022-09-20 NOTE — TELEPHONE ENCOUNTER
Spoke to patient, relayed Dr. Rivera Longest answer to see eye specialist again to change treatment. Patient understood recommendation.

## 2022-09-27 DIAGNOSIS — E78.2 MIXED HYPERLIPIDEMIA: ICD-10-CM

## 2022-09-27 RX ORDER — ROSUVASTATIN CALCIUM 10 MG/1
TABLET, COATED ORAL
Qty: 90 TABLET | Refills: 0 | OUTPATIENT
Start: 2022-09-27

## 2022-09-28 RX ORDER — ROSUVASTATIN CALCIUM 10 MG/1
10 TABLET, COATED ORAL DAILY
Qty: 90 TABLET | Refills: 0 | Status: SHIPPED | OUTPATIENT
Start: 2022-09-28

## 2022-10-03 ENCOUNTER — TELEPHONE (OUTPATIENT)
Dept: FAMILY MEDICINE CLINIC | Age: 87
End: 2022-10-03

## 2022-10-03 NOTE — TELEPHONE ENCOUNTER
Received a transfer from nurse triage:     Patient calling in with the same complaint that she had back on 9/19/2022    She was prescribed the Patanol and Ivizia drops from her eye doctor, but neither one seems to be helping much. Also confirmed she is taking Zyrtec and Naphcon drops. She used Erthythromycin ointment for 10 days and felt that seemed to help more than anything else. Eyes are still watery/itchy and eyelids are swollen and drooping. When she called on 9/19/2022 it was advised that she call her eye specialist since that is where the medication was prescribed. Spoke with patient and daughter, she did not call and get an appt 2 weeks ago when advised. I reiterated to patient's daughter, that a call to the eye specialist will be her best bet since the issue is all with her eyes and they prescribed all the medication.      They both understood and agreed to call and schedule an appt

## 2022-10-04 ENCOUNTER — OFFICE VISIT (OUTPATIENT)
Dept: FAMILY MEDICINE CLINIC | Age: 87
End: 2022-10-04
Payer: MEDICARE

## 2022-10-04 VITALS
DIASTOLIC BLOOD PRESSURE: 68 MMHG | HEIGHT: 61 IN | HEART RATE: 70 BPM | WEIGHT: 137 LBS | BODY MASS INDEX: 25.86 KG/M2 | TEMPERATURE: 97.7 F | OXYGEN SATURATION: 96 % | RESPIRATION RATE: 16 BRPM | SYSTOLIC BLOOD PRESSURE: 148 MMHG

## 2022-10-04 DIAGNOSIS — H57.89 ITCHY, WATERY, AND RED EYE: ICD-10-CM

## 2022-10-04 DIAGNOSIS — J30.9 ALLERGIC RHINITIS, UNSPECIFIED SEASONALITY, UNSPECIFIED TRIGGER: ICD-10-CM

## 2022-10-04 PROCEDURE — 99213 OFFICE O/P EST LOW 20 MIN: CPT

## 2022-10-04 PROCEDURE — 1123F ACP DISCUSS/DSCN MKR DOCD: CPT

## 2022-10-04 RX ORDER — FLUTICASONE PROPIONATE 50 MCG
1 SPRAY, SUSPENSION (ML) NASAL DAILY
Qty: 32 G | Refills: 2 | Status: SHIPPED | OUTPATIENT
Start: 2022-10-04

## 2022-10-04 RX ORDER — LORATADINE 10 MG/1
10 CAPSULE, LIQUID FILLED ORAL DAILY
COMMUNITY
End: 2022-10-04 | Stop reason: ALTCHOICE

## 2022-10-04 RX ORDER — FEXOFENADINE HYDROCHLORIDE 60 MG/1
60 TABLET, FILM COATED ORAL DAILY
Qty: 30 TABLET | Refills: 0 | Status: SHIPPED
Start: 2022-10-04 | End: 2022-11-01 | Stop reason: SDUPTHER

## 2022-10-04 RX ORDER — OLOPATADINE HYDROCHLORIDE 1 MG/ML
1 SOLUTION/ DROPS OPHTHALMIC 2 TIMES DAILY
COMMUNITY

## 2022-10-04 RX ORDER — CETIRIZINE HYDROCHLORIDE 10 MG/1
10 TABLET ORAL DAILY
COMMUNITY
End: 2022-10-04 | Stop reason: ALTCHOICE

## 2022-10-04 NOTE — PROGRESS NOTES
[facility]  Department of Family Medicine  Family Medicine Residency Program      Patient: Reyna Santiago 81 y.o. female     Date of Service: 10/4/22      Chief complaint:   Chief Complaint   Patient presents with    Eye Problem     Itchy, watery, red eyes x 2 months       HISTORY OF PRESENTING ILLNESS     81 y.o. female presented to the clinic today for complaint of watery eyes. Accompanied by friend. 2 month hx of watery, red, itchy B/L eye. No discharge. No pain in eyes. Exacerbated a couple weeks ago with increasing itchiness and watering. No change in vision. Has emi using lubricating drops QD and pataday BID for 3 weeks. Was seen by Optometry. Patient demonstrated use of eye drops, found to be incorrect and therefore products have not been effective. Seen at [facility]. Updated reading glasses. Health Maintenance:  Health Maintenance Due   Topic Date Due    Shingles vaccine (1 of 2) Never done    COVID-19 Vaccine (4 - Booster for Pfizer series) 01/30/2022    Flu vaccine (1) 08/01/2022     Allergies:    Patient has no known allergies. Past Medical History:      Diagnosis Date    Aortic stenosis     COPD (chronic obstructive pulmonary disease) (HCC)     Disorder of bone     Elevated hemoglobin A1c     Fatigue     Hyperlipidemia     Hypertension     Smoker     Traumatic ulcer of lower leg, right, with fat layer exposed ([address] 3/28/2018    Wound of right leg      Past Surgical History:    No past surgical history on file. Social History:   Social History     Socioeconomic History    Marital status:      Spouse name: Not on file    Number of children: Not on file    Years of education: Not on file    Highest education level: Not on file   Occupational History    Not on file   Tobacco Use    Smoking status: Every Day     Packs/day: 0.50     Types: Cigarettes    Smokeless tobacco: Never   Substance and Sexual Activity    Alcohol use: No    Drug use:  No Sexual activity: Not Currently   Other Topics Concern    Not on file   Social History Narrative    Not on file     Social Determinants of Health     Financial Resource Strain: Low Risk     Difficulty of Paying Living Expenses: Not hard at all   Food Insecurity: No Food Insecurity    Worried About Running Out of Food in the Last Year: Never true    Ran Out of Food in the Last Year: Never true   Transportation Needs: Not on file   Physical Activity: Inactive    Days of Exercise per Week: 0 days    Minutes of Exercise per Session: 0 min   Stress: Not on file   Social Connections: Not on file   Intimate Partner Violence: Not on file   Housing Stability: Not on file      Family History:       Problem Relation Age of Onset    No Known Problems Mother     Stroke Father     No Known Problems Brother      Review of Systems:   Review of Systems   Constitutional:  Negative for appetite change and fatigue. HENT:  Negative for sore throat and trouble swallowing. Eyes:  Positive for redness and itching. Negative for pain and visual disturbance. Respiratory:  Negative for cough and shortness of breath. Cardiovascular:  Negative for chest pain, palpitations and leg swelling. Gastrointestinal:  Negative for abdominal pain, constipation, diarrhea, nausea and vomiting. Genitourinary:  Negative for difficulty urinating and dysuria. Musculoskeletal:  Negative for arthralgias and myalgias. Skin:  Negative for color change and rash. Neurological:  Negative for dizziness and headaches. Psychiatric/Behavioral:  Negative for confusion and decreased concentration. PHYSICAL EXAM   Vitals: BP (!) 148/68   Pulse 70   Temp 97.7 °F (36.5 °C) (Temporal)   Resp 16   Ht 5' 1\" (1.549 m)   Wt 137 lb (62.1 kg)   SpO2 96%   BMI 25.89 kg/m²     Physical Exam  Constitutional:       Appearance: Normal appearance. HENT:      Head: Normocephalic and atraumatic.       Mouth/Throat:      Mouth: Mucous membranes are moist. Eyes:      Extraocular Movements: Extraocular movements intact. Pupils: Pupils are equal, round, and reactive to light. Comments: B/L eyes- Mild injected conjunctiva. Watery output. Allergic shiners. No pain with EOM. Cardiovascular:      Rate and Rhythm: Normal rate and regular rhythm. Pulses: Normal pulses. Heart sounds: Normal heart sounds. No murmur heard. Pulmonary:      Effort: Pulmonary effort is normal.      Breath sounds: No stridor. No wheezing. Abdominal:      General: Abdomen is flat. Bowel sounds are normal.      Palpations: Abdomen is soft. Tenderness: There is no abdominal tenderness. Musculoskeletal:         General: No swelling. Normal range of motion. Cervical back: Normal range of motion and neck supple. Skin:     General: Skin is warm and dry. Neurological:      General: No focal deficit present. Mental Status: She is alert and oriented to person, place, and time. Psychiatric:         Mood and Affect: Mood normal.         Behavior: Behavior normal.       ASSESSMENT AND PLAN     1. Itchy, watery, and red eye  - Demonstrated correct usage of eye drop  - Continue current use of 2 eye drop solutions: Patanol and lubricating eye drops  - Will f/u and refer to Optho if no improvement  - fluticasone (FLONASE) 50 MCG/ACT nasal spray; 1 spray by Each Nostril route daily  Dispense: 32 g; Refill: 2    2. Allergic rhinitis, unspecified seasonality, unspecified trigger  - Will add Flonase and Allegra  - fluticasone (FLONASE) 50 MCG/ACT nasal spray; 1 spray by Each Nostril route daily  Dispense: 32 g; Refill: 2  - fexofenadine (ALLEGRA) 60 MG tablet; Take 1 tablet by mouth daily  Dispense: 30 tablet; Refill: 0      Counseled regarding above diagnosis, including possible risks and complications, especially if left uncontrolled.  Counseled regarding the possible side effects, risks, benefits and alternatives to treatment; patient and/or guardian verbalizes understanding, agrees, feels comfortable with and wishes to proceed with above treatment plan. Call or go to ED immediately if symptoms worsen or persist. Advised patient to call with any new medication issues, and, as applicable, read all Rx info from pharmacy to assure aware of all possible risks and side effects of medication before taking. Patient and/or guardian given opportunity to ask questions/raise concerns. The patient verbalized comfort and understanding of instructions. I encourage further reading and education about your health conditions. Information on many health conditions is provided by the American Academy of Family Physicians: https://familydoctor. org/  Please bring any questions to me at your next visit. Current Outpatient Medications   Medication Sig Dispense Refill    olopatadine (PATANOL) 0.1 % ophthalmic solution Place 1 drop into both eyes 2 times daily      Homeopathic Products (SIMILASAN DRY EYE RELIEF OP) Apply to eye      fluticasone (FLONASE) 50 MCG/ACT nasal spray 1 spray by Each Nostril route daily 32 g 2    fexofenadine (ALLEGRA) 60 MG tablet Take 1 tablet by mouth daily 30 tablet 0    rosuvastatin (CRESTOR) 10 MG tablet Take 1 tablet by mouth daily 90 tablet 0    losartan (COZAAR) 50 MG tablet TAKE ONE TABLET BY MOUTH EVERY DAY 90 tablet 1    ALPRAZolam (XANAX) 0.5 MG tablet Take 1 tablet by mouth nightly as needed for Sleep or Anxiety for up to 60 days. 30 tablet 1    vitamin B-6 (PYRIDOXINE) 100 MG tablet Take 100 mg by mouth daily      Cholecalciferol (VITAMIN D3) 1000 units CAPS Take by mouth daily      vitamin B-12 (CYANOCOBALAMIN) 1000 MCG tablet Take 1,000 mcg by mouth daily       No current facility-administered medications for this visit. Return to Office: Return in about 1 month (around 11/4/2022). This document may have been prepared at least partially through the use of voice recognition software.  Although effort is taken to assure the accuracy of this document, it is possible that grammatical, syntax,  or spelling errors may occur.     Pari Calix MD  Family Medicine Resident PGY-2  10/12/2022

## 2022-10-04 NOTE — PROGRESS NOTES
S: 81 y.o. female with   Chief Complaint   Patient presents with    Eye Problem     Itchy, watery, red eyes x 2 months       Using patanol drops, claritin  Eye doctor said should get better with allergy season ending  No vision issues  Itching and watery  2 months, seen for multiple visits    O: VS:  height is 5' 1\" (1.549 m) and weight is 137 lb (62.1 kg). Her temporal temperature is 97.7 °F (36.5 °C). Her blood pressure is 160/80 (abnormal) and her pulse is 70. Her respiration is 16 and oxygen saturation is 96%. BP Readings from Last 3 Encounters:   10/04/22 (!) 160/80   09/06/22 118/72   08/09/22 138/68     See resident note      Impression/Plan:   1) Allergic rhinitis: Improve proper use of patanol drops, switch to allergra, restart flonase; if no improvement, will need to see ophthalmology         Health Maintenance Due   Topic Date Due    Shingles vaccine (1 of 2) Never done    COVID-19 Vaccine (4 - Booster for Ambrose Peter series) 01/30/2022    Flu vaccine (1) 08/01/2022         Attending Physician Statement  I have discussed the case, including pertinent history and exam findings with the resident. I agree with the documented assessment and plan.       Maryana Mccarthy MD

## 2022-10-06 ENCOUNTER — TELEPHONE (OUTPATIENT)
Dept: FAMILY MEDICINE CLINIC | Age: 87
End: 2022-10-06

## 2022-10-06 NOTE — TELEPHONE ENCOUNTER
Patient called in to ask what the name of the eye drop you wanted her to get is?  She can not remember

## 2022-10-12 ASSESSMENT — ENCOUNTER SYMPTOMS
NAUSEA: 0
CONSTIPATION: 0
COLOR CHANGE: 0
EYE ITCHING: 1
EYE REDNESS: 1
SORE THROAT: 0
SHORTNESS OF BREATH: 0
VOMITING: 0
DIARRHEA: 0
COUGH: 0
ABDOMINAL PAIN: 0
EYE PAIN: 0
TROUBLE SWALLOWING: 0

## 2022-10-24 DIAGNOSIS — F51.01 PRIMARY INSOMNIA: ICD-10-CM

## 2022-10-24 DIAGNOSIS — F41.1 GENERALIZED ANXIETY DISORDER: ICD-10-CM

## 2022-10-24 RX ORDER — ALPRAZOLAM 0.5 MG/1
0.5 TABLET ORAL NIGHTLY PRN
Qty: 30 TABLET | Refills: 0 | Status: SHIPPED
Start: 2022-10-24 | End: 2022-10-26 | Stop reason: SDUPTHER

## 2022-10-26 DIAGNOSIS — F41.1 GENERALIZED ANXIETY DISORDER: ICD-10-CM

## 2022-10-26 DIAGNOSIS — F51.01 PRIMARY INSOMNIA: ICD-10-CM

## 2022-10-26 RX ORDER — ALPRAZOLAM 0.5 MG/1
0.5 TABLET ORAL NIGHTLY PRN
Qty: 30 TABLET | Refills: 0 | Status: SHIPPED | OUTPATIENT
Start: 2022-10-26 | End: 2022-12-25

## 2022-10-26 RX ORDER — ALPRAZOLAM 0.5 MG/1
TABLET ORAL
Qty: 30 TABLET | Refills: 0 | OUTPATIENT
Start: 2022-10-26

## 2022-10-26 NOTE — TELEPHONE ENCOUNTER
Patient called in stating that she accidentally lost or threw away the xanax script that she picked up yesterday. Called [facility], spoke with the pharmacist, Juan Phillips. He stated we can send in another script with an RX message and he can fill again. Patient will have to pay cash and it's fairly cheap. Called patient to notify her, she is ok to pay cash. Advised her to check with the pharmacy after 4pm today to  new script.      Pending new script with message to pharmacy

## 2022-11-01 DIAGNOSIS — H57.89 ITCHY, WATERY, AND RED EYE: ICD-10-CM

## 2022-11-01 RX ORDER — FEXOFENADINE HYDROCHLORIDE 60 MG/1
60 TABLET, FILM COATED ORAL DAILY
Qty: 30 TABLET | Refills: 5 | Status: SHIPPED
Start: 2022-11-01 | End: 2022-11-08 | Stop reason: SDUPTHER

## 2022-11-08 ENCOUNTER — OFFICE VISIT (OUTPATIENT)
Dept: FAMILY MEDICINE CLINIC | Age: 87
End: 2022-11-08
Payer: MEDICARE

## 2022-11-08 VITALS
HEIGHT: 61 IN | SYSTOLIC BLOOD PRESSURE: 122 MMHG | RESPIRATION RATE: 16 BRPM | BODY MASS INDEX: 25.68 KG/M2 | WEIGHT: 136 LBS | TEMPERATURE: 98.2 F | DIASTOLIC BLOOD PRESSURE: 75 MMHG | OXYGEN SATURATION: 98 % | HEART RATE: 61 BPM

## 2022-11-08 DIAGNOSIS — H57.89 ITCHY, WATERY, AND RED EYE: ICD-10-CM

## 2022-11-08 DIAGNOSIS — Z23 FLU VACCINE NEED: ICD-10-CM

## 2022-11-08 PROCEDURE — G0008 ADMIN INFLUENZA VIRUS VAC: HCPCS | Performed by: FAMILY MEDICINE

## 2022-11-08 PROCEDURE — 99213 OFFICE O/P EST LOW 20 MIN: CPT

## 2022-11-08 PROCEDURE — 90694 VACC AIIV4 NO PRSRV 0.5ML IM: CPT | Performed by: FAMILY MEDICINE

## 2022-11-08 PROCEDURE — 1123F ACP DISCUSS/DSCN MKR DOCD: CPT

## 2022-11-08 RX ORDER — OLOPATADINE HYDROCHLORIDE 1 MG/ML
1 SOLUTION/ DROPS OPHTHALMIC 2 TIMES DAILY
Qty: 5 ML | Refills: 2 | Status: SHIPPED | OUTPATIENT
Start: 2022-11-08

## 2022-11-08 RX ORDER — FEXOFENADINE HYDROCHLORIDE 60 MG/1
60 TABLET, FILM COATED ORAL DAILY
Qty: 30 TABLET | Refills: 3 | Status: SHIPPED | OUTPATIENT
Start: 2022-11-08 | End: 2023-05-07

## 2022-11-08 NOTE — PROGRESS NOTES
[facility]  Department of Family Medicine  Family Medicine Residency Program      Patient: Karri Bernard 81 y.o. female     Date of Service: 11/8/22      Chief complaint:   Chief Complaint   Patient presents with    Eye Problem     A little better       HISTORY OF PRESENTING ILLNESS     81 y.o. female presented to the clinic today for f/u on B/L red, itchy eyes. F/u on 10/4. Eyes not red anymore. Continues to have B/L itchy, watery eyes. Notices improvement with swelling  Has been using Patanol only and Allegra, daily. Flonase daily. Clear rhinorrhea. Health Maintenance:  Health Maintenance Due   Topic Date Due    Shingles vaccine (1 of 2) Never done    COVID-19 Vaccine (4 - Booster for Pfizer series) 11/25/2021     Allergies:    Patient has no known allergies. Past Medical History:      Diagnosis Date    Aortic stenosis     COPD (chronic obstructive pulmonary disease) ([facility])     Disorder of bone     Elevated hemoglobin A1c     Fatigue     Hyperlipidemia     Hypertension     Smoker     Traumatic ulcer of lower leg, right, with fat layer exposed ([facility] [address] 3/28/2018    Wound of right leg      Past Surgical History:    No past surgical history on file. Social History:   Social History     Socioeconomic History    Marital status:       Spouse name: Not on file    Number of children: Not on file    Years of education: Not on file    Highest education level: Not on file   Occupational History    Not on file   Tobacco Use    Smoking status: Every Day     Packs/day: 0.50     Types: Cigarettes    Smokeless tobacco: Never   Substance and Sexual Activity    Alcohol use: No    Drug use: No    Sexual activity: Not Currently   Other Topics Concern    Not on file   Social History Narrative    Not on file     Social Determinants of Health     Financial Resource Strain: Low Risk     Difficulty of Paying Living Expenses: Not hard at all   Food Insecurity: No Food Insecurity    Worried About Running Out of Food in the Last Year: Never true    Ran Out of Food in the Last Year: Never true   Transportation Needs: Not on file   Physical Activity: Inactive    Days of Exercise per Week: 0 days    Minutes of Exercise per Session: 0 min   Stress: Not on file   Social Connections: Not on file   Intimate Partner Violence: Not on file   Housing Stability: Not on file      Family History:       Problem Relation Age of Onset    No Known Problems Mother     Stroke Father     No Known Problems Brother      Review of Systems:   Review of Systems   Constitutional:  Negative for appetite change and fatigue. HENT:  Positive for rhinorrhea. Negative for sore throat and trouble swallowing. Eyes:  Positive for discharge and redness. Respiratory:  Negative for cough and shortness of breath. Cardiovascular:  Negative for chest pain, palpitations and leg swelling. Gastrointestinal:  Negative for abdominal pain, constipation, diarrhea, nausea and vomiting. Genitourinary:  Negative for difficulty urinating and dysuria. Musculoskeletal:  Negative for arthralgias and myalgias. Skin:  Negative for color change and rash. Neurological:  Negative for dizziness and headaches. Psychiatric/Behavioral:  Negative for confusion and decreased concentration. PHYSICAL EXAM   Vitals: /75   Pulse 61   Temp 98.2 °F (36.8 °C) (Temporal)   Resp 16   Ht 5' 1\" (1.549 m)   Wt 136 lb (61.7 kg)   SpO2 98%   BMI 25.70 kg/m²     Physical Exam  Constitutional:       Appearance: Normal appearance. HENT:      Head: Normocephalic and atraumatic. Ears:      Comments: Bilateral eyes not injected. Improvement in periorbital swelling. Mouth/Throat:      Mouth: Mucous membranes are moist.   Eyes:      Extraocular Movements: Extraocular movements intact. Conjunctiva/sclera: Conjunctivae normal.   Cardiovascular:      Rate and Rhythm: Normal rate and regular rhythm. Pulses: Normal pulses.       Heart sounds: Normal heart sounds. No murmur heard. Pulmonary:      Effort: Pulmonary effort is normal.      Breath sounds: No stridor. No wheezing. Abdominal:      General: Abdomen is flat. Bowel sounds are normal.      Palpations: Abdomen is soft. Tenderness: There is no abdominal tenderness. Musculoskeletal:         General: No swelling. Normal range of motion. Cervical back: Normal range of motion and neck supple. Skin:     General: Skin is warm and dry. Neurological:      General: No focal deficit present. Mental Status: She is alert and oriented to person, place, and time. Psychiatric:         Mood and Affect: Mood normal.         Behavior: Behavior normal.       ASSESSMENT AND PLAN     1. Itchy, watery, and red eye  - Symptoms have improved  - Due to extended duration of symptoms without complete resolution will refer to ophthalmology  - olopatadine (PATANOL) 0.1 % ophthalmic solution; Place 1 drop into both eyes 2 times daily  Dispense: 5 mL; Refill: 2  - fexofenadine (ALLEGRA) 60 MG tablet; Take 1 tablet by mouth daily  Dispense: 30 tablet; Refill: 3  - Ambulatory referral to Ophthalmology    2. Flu vaccine need  - Influenza, FLUAD, (age 72 y+), IM, Preservative Free, 0.5 mL      Counseled regarding above diagnosis, including possible risks and complications, especially if left uncontrolled. Counseled regarding the possible side effects, risks, benefits and alternatives to treatment; patient and/or guardian verbalizes understanding, agrees, feels comfortable with and wishes to proceed with above treatment plan. Call or go to ED immediately if symptoms worsen or persist. Advised patient to call with any new medication issues, and, as applicable, read all Rx info from pharmacy to assure aware of all possible risks and side effects of medication before taking. Patient and/or guardian given opportunity to ask questions/raise concerns. The patient verbalized comfort and understanding of instructions. I encourage further reading and education about your health conditions. Information on many health conditions is provided by the American Academy of Family Physicians: https://familydoctor. org/  Please bring any questions to me at your next visit. Current Outpatient Medications   Medication Sig Dispense Refill    olopatadine (PATANOL) 0.1 % ophthalmic solution Place 1 drop into both eyes 2 times daily 5 mL 2    fexofenadine (ALLEGRA) 60 MG tablet Take 1 tablet by mouth daily 30 tablet 3    ALPRAZolam (XANAX) 0.5 MG tablet Take 1 tablet by mouth nightly as needed for Sleep or Anxiety for up to 60 days. 30 tablet 0    Homeopathic Products (SIMILASAN DRY EYE RELIEF OP) Apply to eye      fluticasone (FLONASE) 50 MCG/ACT nasal spray 1 spray by Each Nostril route daily 32 g 2    rosuvastatin (CRESTOR) 10 MG tablet Take 1 tablet by mouth daily 90 tablet 0    losartan (COZAAR) 50 MG tablet TAKE ONE TABLET BY MOUTH EVERY DAY 90 tablet 1    vitamin B-6 (PYRIDOXINE) 100 MG tablet Take 100 mg by mouth daily      Cholecalciferol (VITAMIN D3) 1000 units CAPS Take by mouth daily      vitamin B-12 (CYANOCOBALAMIN) 1000 MCG tablet Take 1,000 mcg by mouth daily       No current facility-administered medications for this visit. Return to Office: No follow-ups on file. This document may have been prepared at least partially through the use of voice recognition software. Although effort is taken to assure the accuracy of this document, it is possible that grammatical, syntax,  or spelling errors may occur.     Nasra Armijo MD  Family Medicine Resident PGY-2  12/5/2022

## 2022-11-08 NOTE — PROGRESS NOTES
S: 81 y.o. female with   Chief Complaint   Patient presents with    Eye Problem     A little better       F/u eye tearing  Using Patanol and Allergra, +flonase  Redness and swelling improved  Still with itching and watering     O: VS:  height is 5' 1\" (1.549 m) and weight is 136 lb (61.7 kg). Her temporal temperature is 98.2 °F (36.8 °C). Her blood pressure is 122/75 and her pulse is 61. Her respiration is 16 and oxygen saturation is 98%. BP Readings from Last 3 Encounters:   11/08/22 122/75   10/04/22 (!) 148/68   09/06/22 118/72     See resident note      Impression/Plan:   1) Itchy/watery/eyes: +improvement but significant symptoms for 2 months, +referral to ophthalmology, continue same med regimen for now       Health Maintenance Due   Topic Date Due    Shingles vaccine (1 of 2) Never done    COVID-19 Vaccine (4 - Booster for Ambrose Peter series) 11/25/2021    Flu vaccine (1) 08/01/2022         Attending Physician Statement  I have discussed the case, including pertinent history and exam findings with the resident. I agree with the documented assessment and plan.       Lazaro Diaz MD

## 2022-11-09 ENCOUNTER — TELEPHONE (OUTPATIENT)
Dept: FAMILY MEDICINE CLINIC | Age: 87
End: 2022-11-09

## 2022-11-09 NOTE — TELEPHONE ENCOUNTER
----- Message from [place], [place] sent at 11/9/2022  9:19 AM EST -----  Subject: Message to Provider    QUESTIONS  Information for Provider? Patient Called to report that she called to   schedule with Eye specialist that she was referred to yesterday Dr. Desean Pop   and was told they need the referral and could not see one. Pt is asking if   the office can call and set up appt and call her with the time she like   late afternoons. ---------------------------------------------------------------------------  --------------  Aspen Ortega [facility]  [phone]; OK to leave message on voicemail  ---------------------------------------------------------------------------  --------------  SCRIPT ANSWERS  Relationship to Patient?  Self

## 2022-11-21 ENCOUNTER — TELEPHONE (OUTPATIENT)
Dept: FAMILY MEDICINE CLINIC | Age: 87
End: 2022-11-21

## 2022-11-21 DIAGNOSIS — F41.1 GENERALIZED ANXIETY DISORDER: ICD-10-CM

## 2022-11-21 DIAGNOSIS — F51.01 PRIMARY INSOMNIA: ICD-10-CM

## 2022-11-22 RX ORDER — ALPRAZOLAM 0.5 MG/1
0.5 TABLET ORAL NIGHTLY PRN
Qty: 30 TABLET | Refills: 0 | OUTPATIENT
Start: 2022-11-22 | End: 2023-01-21

## 2022-11-22 NOTE — TELEPHONE ENCOUNTER
Per PDMP this was just filled    Also there was an issue with her medication being lost last month and an additional prescription was sent.      Defer management to Dr Sue Miguel - consider in person appointment

## 2022-11-23 NOTE — TELEPHONE ENCOUNTER
Pharmacy confirmed a 30-day supply was picked up on 11/21/22 at 6:38pm by Radhika GUTIÉRREZ on home phone for patient to verify she received this medication

## 2022-12-05 PROBLEM — Z23 FLU VACCINE NEED: Status: ACTIVE | Noted: 2022-12-05

## 2022-12-05 ASSESSMENT — ENCOUNTER SYMPTOMS
TROUBLE SWALLOWING: 0
VOMITING: 0
EYE REDNESS: 1
COLOR CHANGE: 0
ABDOMINAL PAIN: 0
SORE THROAT: 0
CONSTIPATION: 0
NAUSEA: 0
DIARRHEA: 0
EYE DISCHARGE: 1
COUGH: 0
SHORTNESS OF BREATH: 0
RHINORRHEA: 1

## 2022-12-15 ENCOUNTER — TELEPHONE (OUTPATIENT)
Dept: FAMILY MEDICINE CLINIC | Age: 87
End: 2022-12-15

## 2022-12-15 DIAGNOSIS — J30.9 ALLERGIC RHINITIS, UNSPECIFIED SEASONALITY, UNSPECIFIED TRIGGER: ICD-10-CM

## 2022-12-15 DIAGNOSIS — E78.2 MIXED HYPERLIPIDEMIA: ICD-10-CM

## 2022-12-15 DIAGNOSIS — F41.1 GENERALIZED ANXIETY DISORDER: ICD-10-CM

## 2022-12-15 DIAGNOSIS — I10 ESSENTIAL HYPERTENSION: ICD-10-CM

## 2022-12-15 DIAGNOSIS — F51.01 PRIMARY INSOMNIA: ICD-10-CM

## 2022-12-15 DIAGNOSIS — H57.89 ITCHY, WATERY, AND RED EYE: ICD-10-CM

## 2022-12-15 RX ORDER — ALPRAZOLAM 0.5 MG/1
0.5 TABLET ORAL NIGHTLY PRN
Qty: 30 TABLET | Refills: 0 | Status: SHIPPED | OUTPATIENT
Start: 2022-12-15 | End: 2023-02-13

## 2022-12-15 RX ORDER — LOSARTAN POTASSIUM 50 MG/1
TABLET ORAL
Qty: 90 TABLET | Refills: 1 | Status: SHIPPED | OUTPATIENT
Start: 2022-12-15

## 2022-12-15 RX ORDER — CETIRIZINE HYDROCHLORIDE 10 MG/1
10 TABLET ORAL DAILY
Qty: 30 TABLET | Refills: 2 | Status: SHIPPED | OUTPATIENT
Start: 2022-12-15 | End: 2023-01-14

## 2022-12-15 RX ORDER — ROSUVASTATIN CALCIUM 10 MG/1
10 TABLET, COATED ORAL DAILY
Qty: 90 TABLET | Refills: 1 | Status: SHIPPED | OUTPATIENT
Start: 2022-12-15

## 2022-12-15 NOTE — TELEPHONE ENCOUNTER
Refill needed on[de-identified]    Alprazolam 05.  Mg    Cetirizine 10 mg     Losartan 50 mg    Rosuvastatin 10 mg     [facility] on [address]

## 2022-12-19 ENCOUNTER — TELEPHONE (OUTPATIENT)
Dept: FAMILY MEDICINE CLINIC | Age: 87
End: 2022-12-19

## 2022-12-27 ENCOUNTER — OFFICE VISIT (OUTPATIENT)
Dept: FAMILY MEDICINE CLINIC | Age: 87
End: 2022-12-27
Payer: MEDICARE

## 2022-12-27 VITALS
OXYGEN SATURATION: 98 % | DIASTOLIC BLOOD PRESSURE: 65 MMHG | SYSTOLIC BLOOD PRESSURE: 106 MMHG | HEART RATE: 63 BPM | BODY MASS INDEX: 24.73 KG/M2 | RESPIRATION RATE: 18 BRPM | WEIGHT: 131 LBS | HEIGHT: 61 IN

## 2022-12-27 DIAGNOSIS — L03.115 CELLULITIS OF RIGHT LEG: Primary | ICD-10-CM

## 2022-12-27 DIAGNOSIS — S80.811A ABRASION OF RIGHT LOWER EXTREMITY, INITIAL ENCOUNTER: ICD-10-CM

## 2022-12-27 PROCEDURE — 1123F ACP DISCUSS/DSCN MKR DOCD: CPT | Performed by: FAMILY MEDICINE

## 2022-12-27 PROCEDURE — 99214 OFFICE O/P EST MOD 30 MIN: CPT | Performed by: FAMILY MEDICINE

## 2022-12-27 RX ORDER — DOXYCYCLINE HYCLATE 100 MG
100 TABLET ORAL 2 TIMES DAILY
Qty: 14 TABLET | Refills: 0 | Status: SHIPPED
Start: 2022-12-27 | End: 2023-01-04 | Stop reason: SDUPTHER

## 2022-12-27 NOTE — PROGRESS NOTES
oxy    1/2/2023    Adam Coburn is a 81 y.o. femalehere for:    HPI:    Here for sore on knee  Had fall on right knee 1 week ago  She walk by sliding herself without any device  She had tripped on floor   Did not hit head   She was able to get up and go to room   Daughter in other room came to check on her   Was bleeding at that time   Daughter has been using polysporin and bandage   No fever   Minimal pain   Able to walk without assiatnce  No previous injury no surgery   Living with daughter   Not on ASA       BP Readings from Last 3 Encounters:   12/29/22 (!) 116/58   12/27/22 106/65   11/08/22 122/75     Current Outpatient Medications   Medication Sig Dispense Refill    doxycycline hyclate (VIBRA-TABS) 100 MG tablet Take 1 tablet by mouth 2 times daily for 7 days 14 tablet 0    ALPRAZolam (XANAX) 0.5 MG tablet Take 1 tablet by mouth nightly as needed for Sleep or Anxiety for up to 60 days. 30 tablet 0    losartan (COZAAR) 50 MG tablet TAKE ONE TABLET BY MOUTH EVERY DAY 90 tablet 1    rosuvastatin (CRESTOR) 10 MG tablet Take 1 tablet by mouth daily 90 tablet 1    cetirizine (ZYRTEC) 10 MG tablet Take 1 tablet by mouth daily 30 tablet 2    olopatadine (PATANOL) 0.1 % ophthalmic solution Place 1 drop into both eyes 2 times daily 5 mL 2    vitamin B-6 (PYRIDOXINE) 100 MG tablet Take 100 mg by mouth daily (Patient not taking: Reported on 12/29/2022)      Cholecalciferol (VITAMIN D3) 1000 units CAPS Take by mouth daily       No current facility-administered medications for this visit. No Known Allergies    Past Medical & Surgical History:      Diagnosis Date    Aortic stenosis     COPD (chronic obstructive pulmonary disease) ([facility])     Disorder of bone     Elevated hemoglobin A1c     Fatigue     Hyperlipidemia     Hypertension     Smoker     Traumatic ulcer of lower leg, right, with fat layer exposed ([address] 3/28/2018    Wound of right leg      No past surgical history on file.     Family History:      Problem Relation Age of Onset    No Known Problems Mother     Stroke Father     No Known Problems Brother        Social History:  Social History     Tobacco Use    Smoking status: Every Day     Packs/day: 0.50     Types: Cigarettes    Smokeless tobacco: Never   Substance Use Topics    Alcohol use: No       Immunization History   Administered Date(s) Administered    COVID-19, PFIZER PURPLE top, DILUTE for use, (age 15 y+), 30mcg/0.3mL 01/26/2021, 02/17/2021, 09/30/2021    Influenza Vaccine, unspecified formulation 09/30/2014, 10/16/2018    Influenza, FLUAD, (age 72 y+), Adjuvanted, 0.5mL 10/07/2020, 11/08/2022    Influenza, FLUCELVAX, (age 10 mo+), MDCK, PF, 0.5mL 01/19/2018    Influenza, High Dose (Fluzone 65 yrs and older) 09/29/2016    Influenza, Triv, inactivated, subunit, adjuvanted, IM (Fluad 65 yrs and older) 12/11/2018, 09/30/2019    Pneumococcal Conjugate 13-valent ([id number]) 05/28/2019    Pneumococcal Polysaccharide ([id number]) 11/15/2017    Tdap (Boostrix, Adacel) 11/22/2016, 01/09/2019       Review of Systems   Constitutional:  Negative for chills and fever. HENT:  Negative for congestion, sore throat and trouble swallowing. Respiratory:  Negative for cough. Cardiovascular:  Negative for chest pain and leg swelling. Gastrointestinal:  Negative for abdominal pain, constipation, diarrhea, nausea and vomiting. Genitourinary:  Negative for difficulty urinating. Musculoskeletal:  Negative for arthralgias and myalgias. Skin:  Negative for rash and wound. Neurological:  Negative for dizziness and headaches. Psychiatric/Behavioral:  Negative for agitation. VS:  /65   Pulse 63   Resp 18   Ht 5' 1\" (1.549 m)   Wt 131 lb (59.4 kg)   SpO2 98%   BMI 24.75 kg/m²     Physical Exam  Constitutional:       Appearance: Normal appearance. HENT:      Head: Normocephalic. Nose: Nose normal. No rhinorrhea. Eyes:      General: No scleral icterus.      Conjunctiva/sclera: Conjunctivae normal.   Cardiovascular:      Rate and Rhythm: Normal rate and regular rhythm. Pulses: Normal pulses. Heart sounds: Normal heart sounds. No murmur heard. Pulmonary:      Breath sounds: Normal breath sounds. No wheezing, rhonchi or rales. Abdominal:      General: Abdomen is flat. Bowel sounds are normal.   Musculoskeletal:      Right lower leg: No edema. Left lower leg: No edema. Skin:     General: Skin is warm. Findings: No rash. Comments: Large skin tear below right knee, 2-3 cm surface tear with erythema and warmth to touch  Normal ROM   Minimal discharge    Neurological:      General: No focal deficit present. Mental Status: She is alert. Assessment/Plan:  1. Cellulitis of right leg  At this time given concerns of erythema and warmth   Will start pt on doxycycline for MRSA coverage for 7 days   Pt would need to be seen in 2-3 days for wound check    2. Abrasion of right lower extremity, initial encounter  Continue wound care with bandage and bacitracin in the meantime   Pt would need to be seen in 2-3 days for wound check    Follow up:  2-3 days     Patient agrees with the above stated plan. Shauna Shine received counseling on the following healthy behaviors: nutrition, exercise, and medication adherence.     Cassandra Mclean MD  PGY-3 Family Medicine

## 2022-12-27 NOTE — PROGRESS NOTES
S: 81 y.o. female with   Chief Complaint   Patient presents with    Other       Fall 1 week ago, tripped, didn't hit head and no LOC  Hit knee, +bleeding, walked on own afterwards  No confusion  Living at home with daughter  No assistive device  Tried bandaid and neosporin   Redness for few days, worsening  No fevers  No blood thinners    O: VS:  height is 5' 1\" (1.549 m) and weight is 131 lb (59.4 kg). Her blood pressure is 106/65 and her pulse is 63. Her respiration is 18 and oxygen saturation is 98%. BP Readings from Last 3 Encounters:   12/27/22 106/65   11/08/22 122/75   10/04/22 (!) 148/68     See resident note  Right knee, distal surface over tibia, 2-3cm surface abrasion, +surrounding erythema and warmth, +good granulation tissue on wound, normal knee ROM, +minimal serosang drainage     Impression/Plan:   1) Right leg wound and cellulitis: Wound healing well but surrounding cellulitis, pictures and perimeter marked, trial of outpatient abx with close f/u, wash with soap/water BID        Health Maintenance Due   Topic Date Due    Shingles vaccine (1 of 2) Never done    COVID-19 Vaccine (4 - Booster for Ambrose Peter series) 11/25/2021         Attending Physician Statement  I have discussed the case, including pertinent history and exam findings with the resident. I also have seen the patient and performed key portions of the examination. I agree with the documented assessment and plan.       Oscar Quinones MD

## 2022-12-29 ENCOUNTER — OFFICE VISIT (OUTPATIENT)
Dept: FAMILY MEDICINE CLINIC | Age: 87
End: 2022-12-29

## 2022-12-29 VITALS
OXYGEN SATURATION: 93 % | TEMPERATURE: 97.2 F | DIASTOLIC BLOOD PRESSURE: 58 MMHG | HEIGHT: 61 IN | WEIGHT: 128 LBS | BODY MASS INDEX: 24.17 KG/M2 | HEART RATE: 68 BPM | SYSTOLIC BLOOD PRESSURE: 116 MMHG

## 2022-12-29 DIAGNOSIS — L03.115 CELLULITIS OF RIGHT KNEE: ICD-10-CM

## 2022-12-29 DIAGNOSIS — S81.001D OPEN WOUND OF RIGHT KNEE, SUBSEQUENT ENCOUNTER: Primary | ICD-10-CM

## 2022-12-29 PROBLEM — S81.001A OPEN WOUND OF RIGHT KNEE: Status: ACTIVE | Noted: 2022-12-29

## 2022-12-29 NOTE — PROGRESS NOTES
[facility]  Pre[facility]ing [facility]    Subjective:  2 day wound f/u   Had a mechanical fall about a week prior to evaluation   Landed right knee   Developed redness, pain and was seen    Doxy  Wound looks good, cellulitis appears to be improving  No fevers   Managing pain wise     ROS otherwise negative     Past medical, surgical, family and social history were reviewed, non-contributory, and unchanged unless otherwise stated. Objective:    BP (!) 116/58   Pulse 68   Temp 97.2 °F (36.2 °C) (Temporal)   Ht 5' 1\" (1.549 m)   Wt 128 lb (58.1 kg)   SpO2 93%   BMI 24.19 kg/m²     Exam is as noted by resident with which I agree    Assessment/Plan:  Referral to wound care for large skin tear      Attending Physician Statement  I have reviewed the chart, including any radiology or labs. I have discussed the case, including pertinent history and exam findings with the resident. I agree with the assessment, plan and orders as documented by the resident. Please refer to the resident note for additional information.       Electronically signed by Katerin Treviño MD on 12/29/2022 at 12:12 PM

## 2022-12-29 NOTE — PROGRESS NOTES
[facility]  Department of Family Medicine  Family Medicine Residency Program      Patient:  Gifty Vergara 81 y.o. female  Date of Service: 12/29/22      Chief complaint:   Chief Complaint   Patient presents with    Wound Check     Follow up          History ofPresent Illness   Gifty Vergara is a 81 y.o. female who presents to the clinic with complaints as above. Wound Check, Cellulitis Check  Jeff Phipps on about the 20th  Seen in clinic 12/27 and diagnosed with cellulitis and found to have a large open skin tear on her right knee, see pictures  Given Doxycycline   Today, cellulitis improving, taking doxycycline with improvement  Skin tear appears to be creating granulation tissue, but will refer to wound care clinic today as patient unable to care for it and daughter states she cannot care for it either. 12/27 Office Visit, Right Knee Cellulitis and Skin Tear      12/29 (today)       Past Medical History:      Diagnosis Date    Aortic stenosis     COPD (chronic obstructive pulmonary disease) ([facility])     Disorder of bone     Elevated hemoglobin A1c     Fatigue     Hyperlipidemia     Hypertension     Smoker     Traumatic ulcer of lower leg, right, with fat layer exposed ([address] 3/28/2018    Wound of right leg        Past Surgical History:    History reviewed. No pertinent surgical history. Allergies:    Patient has no known allergies. Social History:   Social History     Socioeconomic History    Marital status:       Spouse name: Not on file    Number of children: Not on file    Years of education: Not on file    Highest education level: Not on file   Occupational History    Not on file   Tobacco Use    Smoking status: Every Day     Packs/day: 0.50     Types: Cigarettes    Smokeless tobacco: Never   Substance and Sexual Activity    Alcohol use: No    Drug use: No    Sexual activity: Not Currently   Other Topics Concern    Not on file   Social History Narrative    Not on file Social Determinants of Health     Financial Resource Strain: Low Risk     Difficulty of Paying Living Expenses: Not hard at all   Food Insecurity: No Food Insecurity    Worried About Running Out of Food in the Last Year: Never true    Ran Out of Food in the Last Year: Never true   Transportation Needs: Not on file   Physical Activity: Inactive    Days of Exercise per Week: 0 days    Minutes of Exercise per Session: 0 min   Stress: Not on file   Social Connections: Not on file   Intimate Partner Violence: Not on file   Housing Stability: Not on file        Family History:       Problem Relation Age of Onset    No Known Problems Mother     Stroke Father     No Known Problems Brother        Medication List:    Current Outpatient Medications   Medication Sig Dispense Refill    ALPRAZolam (XANAX) 0.5 MG tablet Take 1 tablet by mouth nightly as needed for Sleep or Anxiety for up to 60 days. 30 tablet 0    losartan (COZAAR) 50 MG tablet TAKE ONE TABLET BY MOUTH EVERY DAY 90 tablet 1    rosuvastatin (CRESTOR) 10 MG tablet Take 1 tablet by mouth daily 90 tablet 1    cetirizine (ZYRTEC) 10 MG tablet Take 1 tablet by mouth daily 30 tablet 2    olopatadine (PATANOL) 0.1 % ophthalmic solution Place 1 drop into both eyes 2 times daily 5 mL 2    Cholecalciferol (VITAMIN D3) 1000 units CAPS Take by mouth daily      vitamin B-6 (PYRIDOXINE) 100 MG tablet Take 100 mg by mouth daily (Patient not taking: Reported on 12/29/2022)       No current facility-administered medications for this visit. Review of Systems:   Review of Systems as per HPI    Physical Exam   Vitals: BP (!) 116/58   Pulse 68   Temp 97.2 °F (36.2 °C) (Temporal)   Ht 5' 1\" (1.549 m)   Wt 128 lb (58.1 kg)   SpO2 93%   BMI 24.19 kg/m²   Physical Exam  Vitals and nursing note reviewed. Constitutional:       General: She is not in acute distress. Appearance: Normal appearance. HENT:      Head: Normocephalic and atraumatic.    Eyes:      General: Right eye: No discharge. Left eye: No discharge. Cardiovascular:      Rate and Rhythm: Normal rate and regular rhythm. Heart sounds: No murmur heard. Pulmonary:      Effort: Pulmonary effort is normal.      Breath sounds: Normal breath sounds. No wheezing. Abdominal:      General: Bowel sounds are normal.      Palpations: Abdomen is soft. Musculoskeletal:         General: Tenderness (cellulitis improving away from marked margins, skin tear without bleeding) present. Normal range of motion. Cervical back: Normal range of motion. No rigidity. Right lower leg: No edema. Left lower leg: No edema. Skin:     General: Skin is warm and dry. Findings: Lesion present. Neurological:      Mental Status: She is alert and oriented to person, place, and time. Mental status is at baseline. Assessment and Plan     1. Open wound of right knee, subsequent encounter  Refer to wound care for large skin tear  No discharge from wound  FU 1 month to assess healing  - [address]    2. Cellulitis of right knee  Resolving, continue doxy  FU MOALISIA  [facility], [address] [place] [address] regarding above diagnosis, including possible risks and complications, especially if left uncontrolled. Counseled regarding the possible side effects, risks, benefits and alternatives to treatment; patient and/or guardian verbalizes understanding, agrees, feels comfortable with, and wishes to proceed with above treatment plan. Call or go to ED immediately if symptoms worsen or persist. Advised patient to call with any new medication issues and, as applicable, read all Rx info from pharmacy to assure aware of all possible risks and side effects of medication before taking. Patient and/or guardian given opportunity to ask questions/raise concerns. The patient verbalized comfort and understanding of instructions.     I encourage further reading and education about your health conditions. Information on many health conditions is provided by the American Academy of Family Physicians: https://familydoctor. org/  Please bring any questions to me at your next visit. Return to Office: Return in about 4 weeks (around 1/26/2023) for FU skin tear.     Clifton Sensing, DO

## 2023-01-02 PROBLEM — L03.115 CELLULITIS OF RIGHT LEG: Status: ACTIVE | Noted: 2023-01-02

## 2023-01-02 PROBLEM — S80.811A ABRASION OF RIGHT LEG: Status: ACTIVE | Noted: 2023-01-02

## 2023-01-02 ASSESSMENT — ENCOUNTER SYMPTOMS
TROUBLE SWALLOWING: 0
ABDOMINAL PAIN: 0
SORE THROAT: 0
COUGH: 0
VOMITING: 0
NAUSEA: 0
DIARRHEA: 0
CONSTIPATION: 0

## 2023-01-03 DIAGNOSIS — S80.811A ABRASION OF RIGHT LOWER EXTREMITY, INITIAL ENCOUNTER: ICD-10-CM

## 2023-01-03 DIAGNOSIS — L03.115 CELLULITIS OF RIGHT LEG: ICD-10-CM

## 2023-01-03 NOTE — TELEPHONE ENCOUNTER
Patient requesting refill on the antibiotic - she does not have an appt at wound care until 1/11/2023

## 2023-01-04 DIAGNOSIS — L03.115 CELLULITIS OF RIGHT LEG: ICD-10-CM

## 2023-01-04 DIAGNOSIS — S80.811A ABRASION OF RIGHT LOWER EXTREMITY, INITIAL ENCOUNTER: ICD-10-CM

## 2023-01-04 RX ORDER — DOXYCYCLINE HYCLATE 100 MG
TABLET ORAL
Qty: 14 TABLET | Refills: 0 | OUTPATIENT
Start: 2023-01-04

## 2023-01-04 RX ORDER — DOXYCYCLINE HYCLATE 100 MG
100 TABLET ORAL 2 TIMES DAILY
Qty: 14 TABLET | Refills: 0 | Status: SHIPPED | OUTPATIENT
Start: 2023-01-04 | End: 2023-01-11

## 2023-01-04 NOTE — DISCHARGE INSTRUCTIONS
Visit Discharge/Physician Orders    Discharge condition: Stable    Assessment of pain at discharge:    Anesthetic used:     Discharge to: Home    Left via:Private automobile    Accompanied by: family/friend    ECF/HHA:     Dressing Orders:to wound right knee, cleanse gently once a day with normal saline solution. Begin to apply small amt of collagen wound matrix. Moisten collagen piece with a few drops of normal saline at application to allow collagen to \"melt\" into wound. Cover and change dressing once daily.     Treatment Orders:    [facility] followup visit __________1 week___________________  (Please note your next appointment above and if you are unable to keep, kindly give a 24 hour notice. Thank you.)    Physician signature:__________________________      If you experience any of the following, please call the Wound Care Center during business hours:    * Increase in Pain  * Temperature over 101  * Increase in drainage from your wound  * Drainage with a foul odor  * Bleeding  * Increase in swelling  * Need for compression bandage changes due to slippage, breakthrough drainage.    If you need medical attention outside of the business hours of the Wound Care Centers please contact your PCP or go to the nearest emergency room.

## 2023-01-04 NOTE — TELEPHONE ENCOUNTER
Can we get an update on the wound? Redness, warmth, drainage? She may potentially not need additional abx. Thanks.

## 2023-01-04 NOTE — TELEPHONE ENCOUNTER
Pt called back asking about antibiotics-    She said she has an appt at the wound center Wednesday. Her wound is red but no drainage or warmth that she can notice. Nam Stephen only other complaint was just that the wound won't close.

## 2023-01-11 ENCOUNTER — HOSPITAL ENCOUNTER (OUTPATIENT)
Dept: WOUND CARE | Age: 88
Discharge: HOME | End: 2023-01-11
Payer: MEDICARE

## 2023-01-11 VITALS
HEIGHT: 61 IN | HEART RATE: 71 BPM | SYSTOLIC BLOOD PRESSURE: 108 MMHG | TEMPERATURE: 96.6 F | RESPIRATION RATE: 18 BRPM | BODY MASS INDEX: 25.11 KG/M2 | WEIGHT: 133 LBS | DIASTOLIC BLOOD PRESSURE: 47 MMHG

## 2023-01-11 DIAGNOSIS — I87.2 VENOUS INSUFFICIENCY (CHRONIC) (PERIPHERAL): ICD-10-CM

## 2023-01-11 DIAGNOSIS — L03.115 CELLULITIS OF RIGHT LEG: Primary | ICD-10-CM

## 2023-01-11 DIAGNOSIS — S81.001A OPEN WOUND OF RIGHT KNEE, INITIAL ENCOUNTER: ICD-10-CM

## 2023-01-11 PROCEDURE — 11042 DBRDMT SUBQ TIS 1ST 20SQCM/<: CPT

## 2023-01-11 PROCEDURE — 99213 OFFICE O/P EST LOW 20 MIN: CPT

## 2023-01-11 RX ORDER — BETAMETHASONE DIPROPIONATE 0.05 %
OINTMENT (GRAM) TOPICAL ONCE
Status: CANCELLED | OUTPATIENT
Start: 2023-01-11 | End: 2023-01-11

## 2023-01-11 RX ORDER — LIDOCAINE 40 MG/G
CREAM TOPICAL ONCE
OUTPATIENT
Start: 2023-01-11 | End: 2023-01-11

## 2023-01-11 RX ORDER — BACITRACIN, NEOMYCIN, POLYMYXIN B 400; 3.5; 5 [USP'U]/G; MG/G; [USP'U]/G
OINTMENT TOPICAL ONCE
OUTPATIENT
Start: 2023-01-11 | End: 2023-01-11

## 2023-01-11 RX ORDER — LIDOCAINE HYDROCHLORIDE 20 MG/ML
JELLY TOPICAL ONCE
Status: CANCELLED | OUTPATIENT
Start: 2023-01-11 | End: 2023-01-11

## 2023-01-11 RX ORDER — LIDOCAINE HYDROCHLORIDE 40 MG/ML
SOLUTION TOPICAL ONCE
Status: CANCELLED | OUTPATIENT
Start: 2023-01-11 | End: 2023-01-11

## 2023-01-11 RX ORDER — LIDOCAINE 50 MG/G
OINTMENT TOPICAL ONCE
Status: CANCELLED | OUTPATIENT
Start: 2023-01-11 | End: 2023-01-11

## 2023-01-11 RX ORDER — BACITRACIN ZINC AND POLYMYXIN B SULFATE 500; 1000 [USP'U]/G; [USP'U]/G
OINTMENT TOPICAL ONCE
Status: CANCELLED | OUTPATIENT
Start: 2023-01-11 | End: 2023-01-11

## 2023-01-11 RX ORDER — BACITRACIN ZINC AND POLYMYXIN B SULFATE 500; 1000 [USP'U]/G; [USP'U]/G
OINTMENT TOPICAL ONCE
OUTPATIENT
Start: 2023-01-11 | End: 2023-01-11

## 2023-01-11 RX ORDER — CLOBETASOL PROPIONATE 0.5 MG/G
OINTMENT TOPICAL ONCE
OUTPATIENT
Start: 2023-01-11 | End: 2023-01-11

## 2023-01-11 RX ORDER — LIDOCAINE HYDROCHLORIDE 20 MG/ML
JELLY TOPICAL ONCE
OUTPATIENT
Start: 2023-01-11 | End: 2023-01-11

## 2023-01-11 RX ORDER — LIDOCAINE 40 MG/G
CREAM TOPICAL ONCE
Status: CANCELLED | OUTPATIENT
Start: 2023-01-11 | End: 2023-01-11

## 2023-01-11 RX ORDER — GENTAMICIN SULFATE 1 MG/G
OINTMENT TOPICAL ONCE
OUTPATIENT
Start: 2023-01-11 | End: 2023-01-11

## 2023-01-11 RX ORDER — GINSENG 100 MG
CAPSULE ORAL ONCE
OUTPATIENT
Start: 2023-01-11 | End: 2023-01-11

## 2023-01-11 RX ORDER — LIDOCAINE HYDROCHLORIDE 40 MG/ML
SOLUTION TOPICAL ONCE
OUTPATIENT
Start: 2023-01-11 | End: 2023-01-11

## 2023-01-11 RX ORDER — GINSENG 100 MG
CAPSULE ORAL ONCE
Status: CANCELLED | OUTPATIENT
Start: 2023-01-11 | End: 2023-01-11

## 2023-01-11 RX ORDER — GENTAMICIN SULFATE 1 MG/G
OINTMENT TOPICAL ONCE
Status: CANCELLED | OUTPATIENT
Start: 2023-01-11 | End: 2023-01-11

## 2023-01-11 RX ORDER — CLOBETASOL PROPIONATE 0.5 MG/G
OINTMENT TOPICAL ONCE
Status: CANCELLED | OUTPATIENT
Start: 2023-01-11 | End: 2023-01-11

## 2023-01-11 RX ORDER — BETAMETHASONE DIPROPIONATE 0.05 %
OINTMENT (GRAM) TOPICAL ONCE
OUTPATIENT
Start: 2023-01-11 | End: 2023-01-11

## 2023-01-11 RX ORDER — BACITRACIN, NEOMYCIN, POLYMYXIN B 400; 3.5; 5 [USP'U]/G; MG/G; [USP'U]/G
OINTMENT TOPICAL ONCE
Status: CANCELLED | OUTPATIENT
Start: 2023-01-11 | End: 2023-01-11

## 2023-01-11 RX ORDER — LIDOCAINE 50 MG/G
OINTMENT TOPICAL ONCE
OUTPATIENT
Start: 2023-01-11 | End: 2023-01-11

## 2023-01-11 NOTE — PROGRESS NOTES
Wound Healing Center /Hyperbarics   History and Physical/Consultation  General Surgery/Medicine    Referring Physician : Kay Valladares MD   [facility] RECORD NUMBER:  [id number]  AGE: 81 y.o. GENDER: female  : 1935  EPISODE DATE:  2023  Subjective:     Chief Complaint   Patient presents with    Wound Check     Right knee         HISTORY of PRESENT ILLNESS HPI     Stan Dubon is a 81 y.o. female who presents today for wound/ulcer evaluation. History of Wound Context:  The patient has had a wound of right knee which was first noted approximately 4 weeks ago when she fell on carpet. This has been treated with topical antibiotics. On their initial visit to the wound healing center,  the patient has noted that the wound has not been improving. The patient has not had similar previous wounds in the past.      Pt is not on abx at time of initial visit. Wound/Ulcer Pain Timing/Severity: intermittent  Quality of pain: aching  Severity:  6 / 10   Modifying Factors: Pain is relieved/improved with rest  Associated Signs/Symptoms: edema, erythema, and drainage    Ulcer Identification:  Ulcer Type: traumatic  Contribut    Wound:           PAST MEDICAL HISTORY      Diagnosis Date    Aortic stenosis     COPD (chronic obstructive pulmonary disease) (HCC)     Disorder of bone     Elevated hemoglobin A1c     Fatigue     Hyperlipidemia     Hypertension     Smoker     Traumatic ulcer of lower leg, right, with fat layer exposed ([address] 3/28/2018    Wound of right leg      History reviewed. No pertinent surgical history.   Family History   Problem Relation Age of Onset    No Known Problems Mother     Stroke Father     No Known Problems Brother      Social History     Tobacco Use    Smoking status: Every Day     Packs/day: 0.50     Types: Cigarettes    Smokeless tobacco: Never   Vaping Use    Vaping Use: Never used   Substance Use Topics    Alcohol use: No    Drug use: No     No Known Allergies  Current Outpatient Medications on File Prior to Encounter   Medication Sig Dispense Refill    ALPRAZolam (XANAX) 0.5 MG tablet Take 1 tablet by mouth nightly as needed for Sleep or Anxiety for up to 60 days. 30 tablet 0    losartan (COZAAR) 50 MG tablet TAKE ONE TABLET BY MOUTH EVERY DAY 90 tablet 1    rosuvastatin (CRESTOR) 10 MG tablet Take 1 tablet by mouth daily 90 tablet 1    cetirizine (ZYRTEC) 10 MG tablet Take 1 tablet by mouth daily 30 tablet 2    olopatadine (PATANOL) 0.1 % ophthalmic solution Place 1 drop into both eyes 2 times daily 5 mL 2    vitamin B-6 (PYRIDOXINE) 100 MG tablet Take 100 mg by mouth daily (Patient not taking: No sig reported)      Cholecalciferol (VITAMIN D3) 1000 units CAPS Take by mouth daily       No current facility-administered medications on file prior to encounter.        REVIEW OF SYSTEMS   ROS : All others Negative if blank [], Positive if [x]  General Urinary   [] Fevers [] Hematuria   [] Chills [] Dysuria   [] Weight Loss Neurolgoic   Skin [] Stroke/TIA   [] Tissue Loss [] Focal weakness   Eyes [] Slurred Speech   [] Wears Glasses/Contacts ENT   [] Vision Changes [] Difficulty swallowing   Respiratory Endocrine    [] Shortness of breath [] Increased Thirst   Cardiovascular Gastrointestinal   [] Chest Pain [] Abdominal Pain   [] Shortness of breath with exertion [] Melena    [] Hematochezia     Objective:    BP (!) 108/47   Pulse 71   Temp (!) 96.6 °F (35.9 °C) (Temporal)   Resp 18   Ht 5' 1\" (1.549 m)   Wt 133 lb (60.3 kg)   BMI 25.13 kg/m²   Wt Readings from Last 3 Encounters:   01/11/23 133 lb (60.3 kg)   12/29/22 128 lb (58.1 kg)   12/27/22 131 lb (59.4 kg)       PHYSICAL EXAM  CONSTITUTIONAL:   Awake, alert, cooperative     PSYCHIATRIC :  Oriented to time, place and person        normal insight to disease process  ENT:  External ears and nose without lesions      Hearing deficit is not noted  NECK: Supple, symmetrical, trachea midline      Thyroid goiter not appreciated     LUNGS:  No increased work of breathing                    Clear to auscultation bilaterally     CARDIOVASCULAR:  regular rate and rhythm     ABDOMEN:  soft, non-distended, non-tender      Hernias is not noted  Lymphatics : Cervical lymphadenopathy is not noted     Femoral lymphadenopathy is not noted  SKIN:   Skin color is normal   Texture and turgor is  normal   Induration is not noted  EXTREMITIES:   R UE Edema is not noted  L UE Edema is not noted  R LE Edema is not noted  L LE Edema is not noted  Assessment:     Problem List Items Addressed This Visit    None      Pre Debridement Measurements:  Are located in the [facility]  Documentation Flow Sheet  Post Debridement Measurements:  Wound/Ulcer Descriptions are Pre Debridement except measurements:     Wound 03/28/18 Abrasion(s) Leg Right; Anterior; Lower #1 acq 3/7/2018 RIGHT LOWER ANTERIOR LEG (Active)   Number of days: 1750       Wound 01/11/23 Knee Anterior;Right #1 (Active)   Wound Image   01/11/23 1316   Wound Etiology Traumatic 01/11/23 1316   Wound Length (cm) 1.5 cm 01/11/23 1316   Wound Width (cm) 1 cm 01/11/23 1316   Wound Depth (cm) 0.1 cm 01/11/23 1316   Wound Surface Area (cm^2) 1.5 cm^2 01/11/23 1316   Wound Volume (cm^3) 0.15 cm^3 01/11/23 1316   Post-Procedure Length (cm) 1.5 cm 01/11/23 1345   Post-Procedure Width (cm) 0.1 cm 01/11/23 1345   Post-Procedure Depth (cm) 0.2 cm 01/11/23 1345   Post-Procedure Surface Area (cm^2) 0.15 cm^2 01/11/23 1345   Post-Procedure Volume (cm^3) 0.03 cm^3 01/11/23 1345   Wound Assessment Pink/red 01/11/23 1316   Drainage Amount Small 01/11/23 1316   Drainage Description Yellow 01/11/23 1316   Odor None 01/11/23 1316   Nicki-wound Assessment Non-blanchable erythema 01/11/23 1316   Number of days: 0          Procedure Note  Indications:  Based on my examination of this patient's wound(s)/ulcer(s) today, debridement is required to promote healing and evaluate the wound base.     Performed by: ADAN Mariely Muhammad MD    Consent obtained:  Yes    Time out taken:  Yes    Pain Control: Anesthetic  Anesthetic: 4% Lidocaine Liquid Topical     Debridement:Excisional Debridement    Using curette the wound(s)/ulcer(s) was/were sharply debrided down through and including the removal of epidermis and dermis. Devitalized Tissue Debrided:  biofilm, slough, and necrotic/eschar to stimulate bleeding to promote healing, post debridement good bleeding base and wound edges noted    Wound/Ulcer #: 1    Percent of Wound/Ulcer Debrided: 100%    Total Surface Area Debrided:  <0 sq cm     Estimated Blood Loss:  Minimal  Hemostasis Achieved:  not needed    Procedural Pain:  6  / 10   Post Procedural Pain:  5 / 10     Response to treatment:  Well tolerated by patient. A culture was not done. Plan:     Pt is not a smoker   -    In my professional opinion and based off the information that is available at this time this patient has appropriate indication for HBO Therapy: No    Treatment Note please see attached Discharge Instructions    Written patient dismissal instructions given to patient and signed by patient or POA. Discharge Instructions         Visit Discharge/Physician Orders    Discharge condition: Stable    Assessment of pain at discharge:    Anesthetic used:     Discharge to: Home    Left via:Private automobile    Accompanied by: family/friend    ECF/HHA:     Dressing Orders:to wound right knee, cleanse gently once a day with normal saline solution. Begin to apply small amt of collagen wound matrix. Moisten collagen piece with a few drops of normal saline at application to allow collagen to \"melt\" into wound. Cover and change dressing once daily. Treatment Orders:    [address] [place] [address],[address] followup visit __________1 week___________________  (Please note your next appointment above and if you are unable to keep, kindly give a 24 hour notice.  Thank you.)    Physician signature:__________________________      If you experience any of the following, please call the [facility] during business hours:    * Increase in Pain  * Temperature over 101  * Increase in drainage from your wound  * Drainage with a foul odor  * Bleeding  * Increase in swelling  * Need for compression bandage changes due to slippage, breakthrough drainage. If you need medical attention outside of the business hours of the [facility] please contact your PCP or go to the nearest emergency room.          Electronically signed by Shanice Rincon MD on 1/11/2023 at 1:49 PM

## 2023-01-11 NOTE — DISCHARGE INSTRUCTIONS
Visit Discharge/Physician Orders     Discharge condition: Stable     Assessment of pain at discharge:     Anesthetic used:      Discharge to: Home     Left via:Private automobile     Accompanied by: family/friend     ECF/HHA:      Dressing Orders: wound healed. Purchase OTC cortisone cream and apply once daily to reddened areas surrounding healed wound, not directly on healed wound   Treatment Orders:     [address] [place] [address],[address] followup visit __________discharged from clinic___________________  (Please note your next appointment above and if you are unable to keep, kindly give a 24 hour notice. Thank you.)     Physician signature:__________________________        If you experience any of the following, please call the [facility] during business hours:     * Increase in Pain  * Temperature over 101  * Increase in drainage from your wound  * Drainage with a foul odor  * Bleeding  * Increase in swelling  * Need for compression bandage changes due to slippage, breakthrough drainage. If you need medical attention outside of the business hours of the [facility] please contact your PCP or go to the nearest emergency room.

## 2023-01-11 NOTE — PLAN OF CARE
Problem: Cognitive:  Goal: Knowledge of wound care  Description: Knowledge of wound care  Outcome: Progressing     Problem: Wound:  Goal: Will show signs of wound healing; wound closure and no evidence of infection  Description: Will show signs of wound healing; wound closure and no evidence of infection  Outcome: Progressing

## 2023-01-16 DIAGNOSIS — F51.01 PRIMARY INSOMNIA: ICD-10-CM

## 2023-01-16 DIAGNOSIS — F41.1 GENERALIZED ANXIETY DISORDER: ICD-10-CM

## 2023-01-16 RX ORDER — ALPRAZOLAM 0.5 MG/1
0.5 TABLET ORAL NIGHTLY PRN
Qty: 30 TABLET | Refills: 0 | Status: SHIPPED | OUTPATIENT
Start: 2023-01-16 | End: 2023-03-17

## 2023-01-18 ENCOUNTER — HOSPITAL ENCOUNTER (OUTPATIENT)
Dept: WOUND CARE | Age: 88
Discharge: HOME | End: 2023-01-18
Payer: MEDICARE

## 2023-01-18 VITALS
TEMPERATURE: 97.3 F | RESPIRATION RATE: 18 BRPM | WEIGHT: 133 LBS | BODY MASS INDEX: 25.11 KG/M2 | SYSTOLIC BLOOD PRESSURE: 132 MMHG | HEIGHT: 61 IN | HEART RATE: 60 BPM | DIASTOLIC BLOOD PRESSURE: 54 MMHG

## 2023-01-18 DIAGNOSIS — S81.001A OPEN WOUND OF RIGHT KNEE, INITIAL ENCOUNTER: ICD-10-CM

## 2023-01-18 DIAGNOSIS — L03.115 CELLULITIS OF RIGHT LEG: Primary | ICD-10-CM

## 2023-01-18 DIAGNOSIS — I87.2 VENOUS INSUFFICIENCY (CHRONIC) (PERIPHERAL): ICD-10-CM

## 2023-01-18 PROCEDURE — 99212 OFFICE O/P EST SF 10 MIN: CPT

## 2023-01-18 RX ORDER — LIDOCAINE HYDROCHLORIDE 40 MG/ML
SOLUTION TOPICAL ONCE
Status: CANCELLED | OUTPATIENT
Start: 2023-01-18 | End: 2023-01-18

## 2023-01-18 RX ORDER — BACITRACIN ZINC AND POLYMYXIN B SULFATE 500; 1000 [USP'U]/G; [USP'U]/G
OINTMENT TOPICAL ONCE
Status: CANCELLED | OUTPATIENT
Start: 2023-01-18 | End: 2023-01-18

## 2023-01-18 RX ORDER — LIDOCAINE HYDROCHLORIDE 20 MG/ML
JELLY TOPICAL ONCE
Status: CANCELLED | OUTPATIENT
Start: 2023-01-18 | End: 2023-01-18

## 2023-01-18 RX ORDER — CLOBETASOL PROPIONATE 0.5 MG/G
OINTMENT TOPICAL ONCE
Status: CANCELLED | OUTPATIENT
Start: 2023-01-18 | End: 2023-01-18

## 2023-01-18 RX ORDER — LIDOCAINE 40 MG/G
CREAM TOPICAL ONCE
Status: CANCELLED | OUTPATIENT
Start: 2023-01-18 | End: 2023-01-18

## 2023-01-18 RX ORDER — GINSENG 100 MG
CAPSULE ORAL ONCE
Status: CANCELLED | OUTPATIENT
Start: 2023-01-18 | End: 2023-01-18

## 2023-01-18 RX ORDER — GENTAMICIN SULFATE 1 MG/G
OINTMENT TOPICAL ONCE
Status: CANCELLED | OUTPATIENT
Start: 2023-01-18 | End: 2023-01-18

## 2023-01-18 RX ORDER — LIDOCAINE 50 MG/G
OINTMENT TOPICAL ONCE
Status: CANCELLED | OUTPATIENT
Start: 2023-01-18 | End: 2023-01-18

## 2023-01-18 RX ORDER — BETAMETHASONE DIPROPIONATE 0.05 %
OINTMENT (GRAM) TOPICAL ONCE
Status: CANCELLED | OUTPATIENT
Start: 2023-01-18 | End: 2023-01-18

## 2023-01-18 RX ORDER — BACITRACIN, NEOMYCIN, POLYMYXIN B 400; 3.5; 5 [USP'U]/G; MG/G; [USP'U]/G
OINTMENT TOPICAL ONCE
Status: CANCELLED | OUTPATIENT
Start: 2023-01-18 | End: 2023-01-18

## 2023-01-18 NOTE — PROGRESS NOTES
Patient's Name/Date of Birth: Tee Umanzor / 1935    Date: 1/18/2023    PCP: Cliff Mccarthy MD    Chief Complaint   Patient presents with    Wound Check     Right knee       HPI:  FU open wound with associated cellulitis right knee. Patient feels and looks much better    Patient's medications, allergies, past medical, surgical, social and family histories were reviewed and updated as appropriate. No Known Allergies    Past Medical History:   Diagnosis Date    Aortic stenosis     COPD (chronic obstructive pulmonary disease) (HCC)     Disorder of bone     Elevated hemoglobin A1c     Fatigue     Hyperlipidemia     Hypertension     Smoker     Traumatic ulcer of lower leg, right, with fat layer exposed ([facility] [address] [facility] 3/28/2018    Wound of right leg         History reviewed. No pertinent surgical history. Social History     Tobacco Use    Smoking status: Every Day     Packs/day: 0.50     Types: Cigarettes    Smokeless tobacco: Never   Substance Use Topics    Alcohol use: No       Current Outpatient Medications   Medication Sig Dispense Refill    ALPRAZolam (XANAX) 0.5 MG tablet Take 1 tablet by mouth nightly as needed for Sleep or Anxiety for up to 60 days. 30 tablet 0    losartan (COZAAR) 50 MG tablet TAKE ONE TABLET BY MOUTH EVERY DAY 90 tablet 1    rosuvastatin (CRESTOR) 10 MG tablet Take 1 tablet by mouth daily 90 tablet 1    olopatadine (PATANOL) 0.1 % ophthalmic solution Place 1 drop into both eyes 2 times daily 5 mL 2    vitamin B-6 (PYRIDOXINE) 100 MG tablet Take 100 mg by mouth daily (Patient not taking: No sig reported)      Cholecalciferol (VITAMIN D3) 1000 units CAPS Take by mouth daily       No current facility-administered medications for this encounter.          Labs:      Review of Systems  Constitutional: negative  Eyes: negative  Ears, nose, mouth, throat, and face: negative  Respiratory: negative  Cardiovascular: negative  Gastrointestinal: negative  Genitourinary:negative  Integument/breast: negative  Hematologic/lymphatic: negative  Musculoskeletal:negative  Neurological: negative  Allergic/Immunologic: negative    Physical exam:  BP (!) 132/54   Pulse 60   Temp 97.3 °F (36.3 °C) (Temporal)   Resp 18   Ht 5' 1\" (1.549 m)   Wt 133 lb (60.3 kg)   BMI 25.13 kg/m²   General appearance: no acute distress  Head:NCAT, EOMI, PERRLA, conjunctiva pink  Neck: no masses, supple  Lungs: CTABL  Heart: RRR  Abdomen: soft, nondistended, nontender, no guarding, no peritoneal signs, normoactive bowel sounds  Extremities:no edema, rght knee wound all healed, remaining surrounding rash persists    Assessment/Plan:  . Wound Care Center Follow-Up Progress Note    Carina Junior  AGE: 81 y.o. GENDER: female  : 1935    TODAY'S DATE:  2023    Subjective: Carina Junior is a 81 y.o. female who presents today for follow-up examination and treatment of a delayed-healing wound(s). The patient denies any problems since last visit. Objective: The wound has healed. The previous open area is now closed with new skin. Assessment:     Patient Active Problem List   Diagnosis Code    Dermatitis L30.9    Venous insufficiency (chronic) (peripheral) I87.2    Aortic stenosis I35.0    COPD (chronic obstructive pulmonary disease) ([facility]) J44.9    Disorder of bone M89.9    Fatigue R53.83    Hyperlipidemia E78.5    Hypertension I10    Smoker F17.200    Immunization counseling Z71.85    Anxiety F41.9    Primary insomnia F51.01    CKD (chronic kidney disease) stage 3, GFR 30-59 ml/min ([facility]) N18.30    Generalized anxiety disorder F41.1    Itchy, watery, and red eye H57.89    Allergic rhinitis J30.9    Flu vaccine need Z23    Open wound of right knee S81.001A    Abrasion of right leg S80.811A    Cellulitis of right leg L03.115       Healed right knee wound wound. Plan:       1. The patient was instructed on caring for this healed would. 2. Discussed with patient to call us with any problems.   3. Discharge from [address] [place], [place] [address], [place]   No follow-ups on file.     Edis Beach MD      Send copy of H&P to PCP, Cate Cabrera MD

## 2023-01-25 DIAGNOSIS — F51.01 PRIMARY INSOMNIA: ICD-10-CM

## 2023-01-25 DIAGNOSIS — F41.1 GENERALIZED ANXIETY DISORDER: ICD-10-CM

## 2023-01-25 RX ORDER — ALPRAZOLAM 0.5 MG/1
0.5 TABLET ORAL NIGHTLY PRN
Qty: 30 TABLET | Refills: 0 | Status: CANCELLED | OUTPATIENT
Start: 2023-01-25 | End: 2023-03-26

## 2023-01-25 NOTE — TELEPHONE ENCOUNTER
Patient called today and left a voicemail for a refill on her Xanax. Dr Sue Miguel just refilled this on 1-16-23 because patient called and stated she lost the pills that were filled in December. Called [facility], Spoke with the pharmacist Stephen Zamorano. She stated that on 1/16/2023 #30 Alprazolam (Xanax) 0.5 mg tablets were picked up and signed for by Fanny Link back to patient; told her that #30 tablet were picked up on 1-16-23 by Yasmine German. She states that she \"spilled them or lost them or something\" and she only has 6 left. I explained that she called last month and said she spilled them and we had to send in more medication sooner than we should have. This time, we sent in #30 10 days ago and patient states she \"doesn't know what is happening to them, but she is not taking more than the one per night that she is supposed to take\"     I explained that this is a controlled substance and is monitored. We have sent in #60 tablets in the last 40 days.

## 2023-01-25 NOTE — TELEPHONE ENCOUNTER
Discussed with Luetta Crigler. Please call daughter to investigate further. I can't send in another script for the xanax already. Thanks.

## 2023-01-26 RX ORDER — ALPRAZOLAM 0.5 MG/1
0.5 TABLET ORAL NIGHTLY PRN
Qty: 14 TABLET | Refills: 0 | Status: SHIPPED | OUTPATIENT
Start: 2023-01-30 | End: 2023-02-13

## 2023-01-26 NOTE — TELEPHONE ENCOUNTER
With all the information and the trouble with losing the pills in the sink and trash, I sent a script for #14 to be filled on Monday. Will not be able to fill early again. A solution needs to be made to help ensure pills aren't lost or destroyed--pill boxes? Pill packs? Thanks.

## 2023-01-26 NOTE — TELEPHONE ENCOUNTER
Called and spoke with daughter, Gema Fitzgerald @ [phone]. She stated that Germaine Howard is not putting the lids to her medications on tight because they are too hard to open. When she went to open it the other day, the pills went flying. Some in the sink of dishwater and others in the trash with coffee grounds. They saved as many as they could. I suggested getting her a pill box and have Martina fill weekly for her. We need to avoid this happening in the future. She agreed. Called and spoke with Germaine Howard, advised her that we are unable to send this just yet and that she can take 1/2 tablet nightly for the time being. I talked to Jon Mohan again at the pharmacy, she stated that she will flag the patients chart and they will fill if Dr Hugh Llanos chooses to send in another script sooner than the 30 days. The insurance will not pay, but she said it's fairly cheap and she can pay out of pocket.

## 2023-02-07 NOTE — DISCHARGE INSTRUCTIONS
Visit Discharge/Physician Orders     Discharge condition: Stable     Assessment of pain at discharge:     Anesthetic used:      Discharge to: Home     Left via:Private automobile     Accompanied by: family/friend     ECF/HHA:      Dressing Orders: to wound on right arm, cleanse gently with normal saline. Apply XEROFORM, and dry dressing. Wrap with kerlix gauze. Change every 48 hrs. Treatment Orders:     [address] [place] [address],[address] followup visit __________2 weeks__________________  (Please note your next appointment above and if you are unable to keep, kindly give a 24 hour notice. Thank you.)     Physician signature:__________________________        If you experience any of the following, please call the [facility] during business hours:     * Increase in Pain  * Temperature over 101  * Increase in drainage from your wound  * Drainage with a foul odor  * Bleeding  * Increase in swelling  * Need for compression bandage changes due to slippage, breakthrough drainage. If you need medical attention outside of the business hours of the [facility] please contact your PCP or go to the nearest emergency room.

## 2023-02-08 ENCOUNTER — HOSPITAL ENCOUNTER (OUTPATIENT)
Dept: WOUND CARE | Age: 88
Discharge: HOME | End: 2023-02-08
Payer: MEDICARE

## 2023-02-08 VITALS
SYSTOLIC BLOOD PRESSURE: 110 MMHG | RESPIRATION RATE: 18 BRPM | DIASTOLIC BLOOD PRESSURE: 62 MMHG | BODY MASS INDEX: 25.11 KG/M2 | WEIGHT: 133 LBS | HEART RATE: 84 BPM | TEMPERATURE: 97.3 F | HEIGHT: 61 IN

## 2023-02-08 DIAGNOSIS — S51.801A OPEN WOUND OF RIGHT FOREARM, INITIAL ENCOUNTER: ICD-10-CM

## 2023-02-08 PROCEDURE — 99212 OFFICE O/P EST SF 10 MIN: CPT | Performed by: SURGERY

## 2023-02-08 RX ORDER — LIDOCAINE HYDROCHLORIDE 40 MG/ML
SOLUTION TOPICAL ONCE
Status: DISCONTINUED | OUTPATIENT
Start: 2023-02-08 | End: 2023-02-09 | Stop reason: HOSPADM

## 2023-02-08 NOTE — PROGRESS NOTES
Patient's Name/Date of Birth: Denice Valdovinos / 1935    Date: 2/8/2023    PCP: Angel Hoff MD    Chief Complaint   Patient presents with    Wound Check     Right arm       HPI:  FU open wound with associated cellulitis right knee. Patient feels and looks much better    Patient's medications, allergies, past medical, surgical, social and family histories were reviewed and updated as appropriate. No Known Allergies    Past Medical History:   Diagnosis Date    Aortic stenosis     COPD (chronic obstructive pulmonary disease) (HCC)     Disorder of bone     Elevated hemoglobin A1c     Fatigue     Hyperlipidemia     Hypertension     Open wound of right forearm 2/8/2023    Skin only    Smoker     Traumatic ulcer of lower leg, right, with fat layer exposed ([address] 3/28/2018    Wound of right leg         History reviewed. No pertinent surgical history. Social History     Tobacco Use    Smoking status: Every Day     Packs/day: 0.50     Types: Cigarettes    Smokeless tobacco: Never   Substance Use Topics    Alcohol use: No       Current Outpatient Medications   Medication Sig Dispense Refill    ALPRAZolam (XANAX) 0.5 MG tablet Take 1 tablet by mouth nightly as needed for Sleep or Anxiety for up to 14 days.  Max Daily Amount: 0.5 mg 14 tablet 0    losartan (COZAAR) 50 MG tablet TAKE ONE TABLET BY MOUTH EVERY DAY 90 tablet 1    rosuvastatin (CRESTOR) 10 MG tablet Take 1 tablet by mouth daily 90 tablet 1    olopatadine (PATANOL) 0.1 % ophthalmic solution Place 1 drop into both eyes 2 times daily 5 mL 2    vitamin B-6 (PYRIDOXINE) 100 MG tablet Take 100 mg by mouth daily (Patient not taking: No sig reported)      Cholecalciferol (VITAMIN D3) 1000 units CAPS Take by mouth daily       Current Facility-Administered Medications   Medication Dose Route Frequency Provider Last Rate Last Admin    lidocaine (XYLOCAINE) 4 % external solution   Topical Once Rickyece MD Avis             Labs:      Review of Systems  Constitutional: negative  Eyes: negative  Ears, nose, mouth, throat, and face: negative  Respiratory: negative  Cardiovascular: negative  Gastrointestinal: negative  Genitourinary:negative  Integument/breast: negative  Hematologic/lymphatic: negative  Musculoskeletal:negative  Neurological: negative  Allergic/Immunologic: negative    Physical exam:  /62   Pulse 84   Temp 97.3 °F (36.3 °C) (Temporal)   Resp 18   Ht 5' 1\" (1.549 m)   Wt 133 lb (60.3 kg)   BMI 25.13 kg/m²   General appearance: no acute distress  Head:NCAT, EOMI, PERRLA, conjunctiva pink  Neck: no masses, supple  Lungs: CTABL  Heart: RRR  Abdomen: soft, nondistended, nontender, no guarding, no peritoneal signs, normoactive bowel sounds  Extremities:no edema, rght knee wound all healed, remaining surrounding rash persists    Assessment/Plan:  . Wound Care Center Follow-Up Progress Note    Preethi Clark  AGE: 81 y.o. GENDER: female  : 1935    TODAY'S DATE:  2023    Subjective: Preethi Clark is a 81 y.o. female who presents today for follow-up examination and treatment of a delayed-healing wound(s). The patient denies any problems since last visit. Patient today presents with a open wound, right forearm, tells her that it was there a few weeks ago, 1 area healed up the other area new area showed up denies any history of trauma no history of fever or chills    Objective:      The wound of the right forearm, 1 cm 1 cm, fairly clean, skin only, no cellulitis, no purulent drainage    Good radial pulse      Assessment:     Patient Active Problem List   Diagnosis Code    Dermatitis L30.9    Venous insufficiency (chronic) (peripheral) I87.2    Aortic stenosis I35.0    COPD (chronic obstructive pulmonary disease) ([facility]) J44.9    Disorder of bone M89.9    Fatigue R53.83    Hyperlipidemia E78.5    Hypertension I10    Smoker F17.200    Immunization counseling Z71.85    Anxiety F41.9    Primary insomnia F51.01    CKD (chronic kidney disease) stage 3, GFR 30-59 ml/min ([facility]) N18.30    Generalized anxiety disorder F41.1    Itchy, watery, and red eye H57.89    Allergic rhinitis J30.9    Flu vaccine need Z23    Open wound of right knee S81.001A    Abrasion of right leg S80.811A    Cellulitis of right leg L03.115    Open wound of right forearm S51.801A       Skin ulcer, right forearm, no debridement done          Plan:       1. The patient was instructed on caring of the wound, apply Xeroform gauze on a daily basis until healed  2. Discussed with patient to call us with any problems. Discussed the patient and the nursing staff    Meryl Bui MD   No follow-ups on file.     Meryl Bui MD      Send copy of H&P to PCP, Víctor Calle MD

## 2023-02-16 NOTE — DISCHARGE INSTRUCTIONS
Visit Discharge/Physician Orders     Discharge condition: Stable     Assessment of pain at discharge:     Anesthetic used:      Discharge to: Home     Left via:Private automobile     Accompanied by: family/friend     ECF/HHA:    wound on right arm  now healed. May leave area open to air. To area right knee, apply cortisone cream once daily at bedtime. [address] [place] [address],[address] followup visit __________discharge from wound center; wounds are all now healed. _________________  (Please note your next appointment above and if you are unable to keep, kindly give a 24 hour notice. Thank you.)     Physician signature:__________________________        If you experience any of the following, please call the [facility] during business hours:     * Increase in Pain  * Temperature over 101  * Increase in drainage from your wound  * Drainage with a foul odor  * Bleeding  * Increase in swelling  * Need for compression bandage changes due to slippage, breakthrough drainage. If you need medical attention outside of the business hours of the [facility] please contact your PCP or go to the nearest emergency room.

## 2023-02-22 ENCOUNTER — HOSPITAL ENCOUNTER (OUTPATIENT)
Dept: WOUND CARE | Age: 88
Discharge: HOME | End: 2023-02-22
Payer: MEDICARE

## 2023-02-22 VITALS
WEIGHT: 133 LBS | HEART RATE: 52 BPM | DIASTOLIC BLOOD PRESSURE: 48 MMHG | RESPIRATION RATE: 18 BRPM | HEIGHT: 61 IN | TEMPERATURE: 96.8 F | BODY MASS INDEX: 25.11 KG/M2 | SYSTOLIC BLOOD PRESSURE: 155 MMHG

## 2023-02-22 PROBLEM — S41.101A OPEN WOUND OF RIGHT UPPER ARM: Status: ACTIVE | Noted: 2023-02-22

## 2023-02-22 PROCEDURE — 99213 OFFICE O/P EST LOW 20 MIN: CPT | Performed by: SURGERY

## 2023-02-22 PROCEDURE — 99212 OFFICE O/P EST SF 10 MIN: CPT

## 2023-02-22 NOTE — PROGRESS NOTES
Patient's Name/Date of Birth: Tyesha Perea / 1935    Date: 2/22/2023    PCP: Stefanie Hanson MD    Chief Complaint   Patient presents with    Wound Check     Right arm       HPI:  FU open wound right arm. Patient with no complaints. States wound healed    Patient's medications, allergies, past medical, surgical, social and family histories were reviewed and updated as appropriate. No Known Allergies    Past Medical History:   Diagnosis Date    Aortic stenosis     COPD (chronic obstructive pulmonary disease) (HCC)     Disorder of bone     Elevated hemoglobin A1c     Fatigue     Hyperlipidemia     Hypertension     Open wound of right forearm 2/8/2023    Skin only    Smoker     Traumatic ulcer of lower leg, right, with fat layer exposed ([address] 3/28/2018    Wound of right leg         History reviewed. No pertinent surgical history. Social History     Tobacco Use    Smoking status: Every Day     Packs/day: 0.50     Types: Cigarettes    Smokeless tobacco: Never   Substance Use Topics    Alcohol use: No       Current Outpatient Medications   Medication Sig Dispense Refill    losartan (COZAAR) 50 MG tablet TAKE ONE TABLET BY MOUTH EVERY DAY 90 tablet 1    rosuvastatin (CRESTOR) 10 MG tablet Take 1 tablet by mouth daily 90 tablet 1    olopatadine (PATANOL) 0.1 % ophthalmic solution Place 1 drop into both eyes 2 times daily 5 mL 2    vitamin B-6 (PYRIDOXINE) 100 MG tablet Take 100 mg by mouth daily (Patient not taking: No sig reported)      Cholecalciferol (VITAMIN D3) 1000 units CAPS Take by mouth daily       No current facility-administered medications for this encounter.          Labs:      Review of Systems  Constitutional: negative  Eyes: negative  Ears, nose, mouth, throat, and face: negative  Respiratory: negative  Cardiovascular: negative  Gastrointestinal: negative  Genitourinary:negative  Integument/breast: negative  Hematologic/lymphatic: negative  Musculoskeletal:negative  Neurological: negative  Allergic/Immunologic: negative    Physical exam:  BP (!) 155/48   Pulse 52   Temp 96.8 °F (36 °C) (Temporal)   Resp 18   Ht 5' 1\" (1.549 m)   Wt 133 lb (60.3 kg)   BMI 25.13 kg/m²   General appearance: no acute distress  Head:NCAT, EOMI, PERRLA, conjunctiva pink  Neck: no masses, supple  Lungs: CTABL  Heart: RRR  Abdomen: soft, nondistended, nontender, no guarding, no peritoneal signs, normoactive bowel sounds  Extremities:no edema    Assessment/Plan:  . Wound Care Center Follow-Up Progress Note    Luis Eduardo Best  AGE: 81 y.o. GENDER: female  : 1935    TODAY'S DATE:  2023    Subjective: Luis Eduardo Best is a 81 y.o. female who presents today for follow-up examination and treatment of a delayed-healing wound(s). The patient denies any problems since last visit. Objective: The wound has healed. The previous open area is now closed with new skin. Assessment:     Patient Active Problem List   Diagnosis Code    Dermatitis L30.9    Venous insufficiency (chronic) (peripheral) I87.2    Aortic stenosis I35.0    COPD (chronic obstructive pulmonary disease) ([facility]) J44.9    Disorder of bone M89.9    Fatigue R53.83    Hyperlipidemia E78.5    Hypertension I10    Smoker F17.200    Immunization counseling Z71.85    Anxiety F41.9    Primary insomnia F51.01    CKD (chronic kidney disease) stage 3, GFR 30-59 ml/min ([facility]) N18.30    Generalized anxiety disorder F41.1    Itchy, watery, and red eye H57.89    Allergic rhinitis J30.9    Flu vaccine need Z23    Open wound of right knee S81.001A    Abrasion of right leg S80.811A    Cellulitis of right leg L03.115    Open wound of right forearm S51.801A    Open wound of right upper arm S41.101A       Healed right arm  wound. Plan:       1. The patient was instructed on caring for this healed would. 2. Discussed with patient to call us with any problems.   3. Discharge from [address] [place] [address], MD     No follow-ups on file.    Frankie Sánchez MD      Send copy of H&P to PCP, Shabana Escalante MD

## 2023-02-22 NOTE — PLAN OF CARE
Problem: ABCDS Injury Assessment  Goal: Absence of physical injury  Outcome: Completed     Problem: Cognitive:  Goal: Knowledge of wound care  Description: Knowledge of wound care  Outcome: Completed  Goal: Understands risk factors for wounds  Description: Understands risk factors for wounds  Outcome: Completed     Problem: Wound:  Goal: Will show signs of wound healing; wound closure and no evidence of infection  Description: Will show signs of wound healing; wound closure and no evidence of infection  Outcome: Completed

## 2023-02-27 DIAGNOSIS — F51.01 PRIMARY INSOMNIA: ICD-10-CM

## 2023-02-27 DIAGNOSIS — F41.1 GENERALIZED ANXIETY DISORDER: ICD-10-CM

## 2023-02-27 RX ORDER — ALPRAZOLAM 0.5 MG/1
0.5 TABLET ORAL NIGHTLY PRN
Qty: 30 TABLET | Refills: 0 | Status: SHIPPED | OUTPATIENT
Start: 2023-02-27 | End: 2023-03-29

## 2023-02-27 NOTE — TELEPHONE ENCOUNTER
Refill needed on;    Alprazolam 0.5 mg  at [facility] on [address].  Next appt 3/20/23.

## 2023-03-15 ENCOUNTER — NURSE ONLY (OUTPATIENT)
Dept: FAMILY MEDICINE CLINIC | Age: 88
End: 2023-03-15
Payer: MEDICARE

## 2023-03-15 DIAGNOSIS — E78.2 MIXED HYPERLIPIDEMIA: ICD-10-CM

## 2023-03-15 DIAGNOSIS — N18.31 STAGE 3A CHRONIC KIDNEY DISEASE (HCC): Primary | ICD-10-CM

## 2023-03-15 DIAGNOSIS — N18.31 STAGE 3A CHRONIC KIDNEY DISEASE (HCC): ICD-10-CM

## 2023-03-15 LAB
ALBUMIN SERPL-MCNC: 4 G/DL (ref 3.5–5.2)
ALP SERPL-CCNC: 114 U/L (ref 35–104)
ALT SERPL-CCNC: 17 U/L (ref 0–32)
ANION GAP SERPL CALCULATED.3IONS-SCNC: 13 MMOL/L (ref 7–16)
AST SERPL-CCNC: 27 U/L (ref 0–31)
BASOPHILS # BLD: 0.06 E9/L (ref 0–0.2)
BASOPHILS NFR BLD: 0.8 % (ref 0–2)
BILIRUB SERPL-MCNC: 0.3 MG/DL (ref 0–1.2)
BUN SERPL-MCNC: 24 MG/DL (ref 6–23)
CALCIUM SERPL-MCNC: 9.1 MG/DL (ref 8.6–10.2)
CHLORIDE SERPL-SCNC: 117 MMOL/L (ref 98–107)
CHOLESTEROL, FASTING: 114 MG/DL (ref 0–199)
CO2 SERPL-SCNC: 24 MMOL/L (ref 22–29)
CREAT SERPL-MCNC: 1.2 MG/DL (ref 0.5–1)
EOSINOPHIL # BLD: 0.01 E9/L (ref 0.05–0.5)
EOSINOPHIL NFR BLD: 0.1 % (ref 0–6)
ERYTHROCYTE [DISTWIDTH] IN BLOOD BY AUTOMATED COUNT: 14 FL (ref 11.5–15)
GLUCOSE SERPL-MCNC: 87 MG/DL (ref 74–99)
HCT VFR BLD AUTO: 38.8 % (ref 34–48)
HDLC SERPL-MCNC: 62 MG/DL
HGB BLD-MCNC: 12.3 G/DL (ref 11.5–15.5)
IMM GRANULOCYTES # BLD: 0.02 E9/L
IMM GRANULOCYTES NFR BLD: 0.3 % (ref 0–5)
LDL CHOLESTEROL CALCULATED: 40 MG/DL (ref 0–99)
LYMPHOCYTES # BLD: 2.55 E9/L (ref 1.5–4)
LYMPHOCYTES NFR BLD: 34.6 % (ref 20–42)
MCH RBC QN AUTO: 32.5 PG (ref 26–35)
MCHC RBC AUTO-ENTMCNC: 31.7 % (ref 32–34.5)
MCV RBC AUTO: 102.6 FL (ref 80–99.9)
MONOCYTES # BLD: 0.63 E9/L (ref 0.1–0.95)
MONOCYTES NFR BLD: 8.5 % (ref 2–12)
NEUTROPHILS # BLD: 4.1 E9/L (ref 1.8–7.3)
NEUTS SEG NFR BLD: 55.7 % (ref 43–80)
PLATELET # BLD AUTO: 210 E9/L (ref 130–450)
PMV BLD AUTO: 14.2 FL (ref 7–12)
POTASSIUM SERPL-SCNC: 4.4 MMOL/L (ref 3.5–5)
PROT SERPL-MCNC: 7 G/DL (ref 6.4–8.3)
RBC # BLD AUTO: 3.78 E12/L (ref 3.5–5.5)
SODIUM SERPL-SCNC: 154 MMOL/L (ref 132–146)
TRIGLYCERIDE, FASTING: 59 MG/DL (ref 0–149)
TSH SERPL-MCNC: 4.17 UIU/ML (ref 0.27–4.2)
VLDLC SERPL CALC-MCNC: 12 MG/DL
WBC # BLD: 7.4 E9/L (ref 4.5–11.5)

## 2023-03-15 PROCEDURE — 36415 COLL VENOUS BLD VENIPUNCTURE: CPT | Performed by: FAMILY MEDICINE

## 2023-03-16 ENCOUNTER — TELEPHONE (OUTPATIENT)
Dept: FAMILY MEDICINE CLINIC | Age: 88
End: 2023-03-16

## 2023-03-16 NOTE — TELEPHONE ENCOUNTER
Refill needed on:    Losartan 50 mg  at [facility] on [address]

## 2023-03-20 ENCOUNTER — OFFICE VISIT (OUTPATIENT)
Dept: FAMILY MEDICINE CLINIC | Age: 88
End: 2023-03-20
Payer: MEDICARE

## 2023-03-20 ENCOUNTER — HOSPITAL ENCOUNTER (OUTPATIENT)
Dept: GENERAL RADIOLOGY | Age: 88
Discharge: HOME | End: 2023-03-22
Payer: MEDICARE

## 2023-03-20 ENCOUNTER — HOSPITAL ENCOUNTER (OUTPATIENT)
Age: 88
Discharge: HOME | End: 2023-03-22
Payer: MEDICARE

## 2023-03-20 VITALS
HEIGHT: 61 IN | RESPIRATION RATE: 16 BRPM | WEIGHT: 125 LBS | DIASTOLIC BLOOD PRESSURE: 62 MMHG | TEMPERATURE: 97.2 F | SYSTOLIC BLOOD PRESSURE: 100 MMHG | HEART RATE: 50 BPM | BODY MASS INDEX: 23.6 KG/M2 | OXYGEN SATURATION: 90 %

## 2023-03-20 DIAGNOSIS — E87.0 HYPERNATREMIA: Primary | ICD-10-CM

## 2023-03-20 DIAGNOSIS — J43.0 UNILATERAL EMPHYSEMA (HCC): ICD-10-CM

## 2023-03-20 DIAGNOSIS — E87.0 HYPERNATREMIA: ICD-10-CM

## 2023-03-20 DIAGNOSIS — F51.01 PRIMARY INSOMNIA: ICD-10-CM

## 2023-03-20 DIAGNOSIS — I10 ESSENTIAL HYPERTENSION: ICD-10-CM

## 2023-03-20 DIAGNOSIS — E78.2 MIXED HYPERLIPIDEMIA: ICD-10-CM

## 2023-03-20 DIAGNOSIS — R63.4 WEIGHT LOSS: ICD-10-CM

## 2023-03-20 DIAGNOSIS — F41.1 GENERALIZED ANXIETY DISORDER: ICD-10-CM

## 2023-03-20 DIAGNOSIS — I35.0 NONRHEUMATIC AORTIC VALVE STENOSIS: ICD-10-CM

## 2023-03-20 DIAGNOSIS — N18.31 STAGE 3A CHRONIC KIDNEY DISEASE (HCC): ICD-10-CM

## 2023-03-20 LAB
ANION GAP SERPL CALCULATED.3IONS-SCNC: 16 MMOL/L (ref 7–16)
BUN SERPL-MCNC: 25 MG/DL (ref 6–23)
CALCIUM SERPL-MCNC: 9.4 MG/DL (ref 8.6–10.2)
CHLORIDE SERPL-SCNC: 107 MMOL/L (ref 98–107)
CO2 SERPL-SCNC: 19 MMOL/L (ref 22–29)
CREAT SERPL-MCNC: 1 MG/DL (ref 0.5–1)
GLUCOSE SERPL-MCNC: 79 MG/DL (ref 74–99)
POTASSIUM SERPL-SCNC: 4.2 MMOL/L (ref 3.5–5)
SODIUM SERPL-SCNC: 142 MMOL/L (ref 132–146)

## 2023-03-20 PROCEDURE — 71046 X-RAY EXAM CHEST 2 VIEWS: CPT

## 2023-03-20 PROCEDURE — 1123F ACP DISCUSS/DSCN MKR DOCD: CPT | Performed by: FAMILY MEDICINE

## 2023-03-20 PROCEDURE — 99214 OFFICE O/P EST MOD 30 MIN: CPT | Performed by: FAMILY MEDICINE

## 2023-03-20 RX ORDER — LOSARTAN POTASSIUM 25 MG/1
25 TABLET ORAL DAILY
Qty: 90 TABLET | Refills: 1 | Status: SHIPPED | OUTPATIENT
Start: 2023-03-20

## 2023-03-20 SDOH — ECONOMIC STABILITY: INCOME INSECURITY: HOW HARD IS IT FOR YOU TO PAY FOR THE VERY BASICS LIKE FOOD, HOUSING, MEDICAL CARE, AND HEATING?: NOT HARD AT ALL

## 2023-03-20 SDOH — ECONOMIC STABILITY: FOOD INSECURITY: WITHIN THE PAST 12 MONTHS, YOU WORRIED THAT YOUR FOOD WOULD RUN OUT BEFORE YOU GOT MONEY TO BUY MORE.: NEVER TRUE

## 2023-03-20 SDOH — ECONOMIC STABILITY: FOOD INSECURITY: WITHIN THE PAST 12 MONTHS, THE FOOD YOU BOUGHT JUST DIDN'T LAST AND YOU DIDN'T HAVE MONEY TO GET MORE.: NEVER TRUE

## 2023-03-20 SDOH — ECONOMIC STABILITY: HOUSING INSECURITY
IN THE LAST 12 MONTHS, WAS THERE A TIME WHEN YOU DID NOT HAVE A STEADY PLACE TO SLEEP OR SLEPT IN A SHELTER (INCLUDING NOW)?: NO

## 2023-03-20 ASSESSMENT — PATIENT HEALTH QUESTIONNAIRE - PHQ9
SUM OF ALL RESPONSES TO PHQ QUESTIONS 1-9: 1
2. FEELING DOWN, DEPRESSED OR HOPELESS: 1
1. LITTLE INTEREST OR PLEASURE IN DOING THINGS: 0
SUM OF ALL RESPONSES TO PHQ QUESTIONS 1-9: 1
SUM OF ALL RESPONSES TO PHQ9 QUESTIONS 1 & 2: 1
SUM OF ALL RESPONSES TO PHQ QUESTIONS 1-9: 1
SUM OF ALL RESPONSES TO PHQ QUESTIONS 1-9: 1

## 2023-03-20 NOTE — PROGRESS NOTES
Subjective:  81 y.o. y/o female is here for follow up chronic hypertension. This is generally controlled on current medication regimen, running on low side. Takes meds as directed and tolerates them well. Reviewed recent labs with patient, +elevated Na/Cl/BUN (reduced sodium and increased water over the weekend), otherwise OK and stable. No symptoms from htn standpoint per ROS. Patient is somewhat compliant with lifestyle modifications. Patient does smoke. Comorbid conditions include below. Here with friend    +Weight loss (8lb in 1 month, 28lb in 1 year), +states been lazy and doesn't want to make things, +doesn't want meals delivered or any other intervention  Thyroid normal, CBC unremarkable, no DM, kidney stable, liver OK  No previous lung imaging  Last mammogram 5/17, denies any breast masses/tenderness  No changes in bowels, no blood in stool  +smoker    Wound clinic recently, both wounds now healed, UE laceration and abrasion near knee     Cardiology: Dr. Germaine Barrett, moderate aortic stenosis, seen 10/17, doesn't want to go back, no symptoms, does not want repeat echo  Pulmonology: Dr. Fritz Lr, last seen few years ago, no inhalers, no symptoms, no desire to go back  Anxiety/insomnia: Xanax 0.5mg nightly, taking xanax for many years, no falls, able to fall asleep but trouble staying asleep, sleeps 4.5-5 hours/night   OARRS checked with no discrepancies, understands risks  Hyperlipidemia: crestor 10mg, taking and tolerating  CKD3: Stable, does not see nephrology, does take ibuprofen 400mg most every day now, no PPI  Derm: Dr. Rody Casey regularly  Feels strong in legs, no fall in over 2 years  Declines DEXA  Continues to smoke, no desire to quit  +casinos, +bingo nights  Lives with daughter      Patient's past medical, surgical, social and/or family history reviewed, updated in chart, and are non-contributory (unless otherwise stated). Medications and allergies also reviewed and updated in chart.         Review of

## 2023-03-22 PROBLEM — S81.001A OPEN WOUND OF RIGHT KNEE: Status: RESOLVED | Noted: 2022-12-29 | Resolved: 2023-03-22

## 2023-03-22 PROBLEM — H57.89 ITCHY, WATERY, AND RED EYE: Status: RESOLVED | Noted: 2022-08-10 | Resolved: 2023-03-22

## 2023-03-22 PROBLEM — L30.9 DERMATITIS: Status: RESOLVED | Noted: 2017-09-01 | Resolved: 2023-03-22

## 2023-03-22 PROBLEM — S41.101A OPEN WOUND OF RIGHT UPPER ARM: Status: RESOLVED | Noted: 2023-02-22 | Resolved: 2023-03-22

## 2023-03-22 PROBLEM — L03.115 CELLULITIS OF RIGHT LEG: Status: RESOLVED | Noted: 2023-01-02 | Resolved: 2023-03-22

## 2023-03-22 PROBLEM — S80.811A ABRASION OF RIGHT LEG: Status: RESOLVED | Noted: 2023-01-02 | Resolved: 2023-03-22

## 2023-03-22 PROBLEM — S51.801A OPEN WOUND OF RIGHT FOREARM: Status: RESOLVED | Noted: 2023-02-08 | Resolved: 2023-03-22

## 2023-03-22 PROBLEM — H57.9 ITCHY, WATERY, AND RED EYE: Status: RESOLVED | Noted: 2022-08-10 | Resolved: 2023-03-22

## 2023-03-23 ENCOUNTER — TELEPHONE (OUTPATIENT)
Dept: FAMILY MEDICINE CLINIC | Age: 88
End: 2023-03-23

## 2023-03-23 NOTE — TELEPHONE ENCOUNTER
Patient wants to know why her blood pressure medication was increased when there is no problem with her blood pressure. Please call patient.

## 2023-03-23 NOTE — TELEPHONE ENCOUNTER
Patient informed the Losartan was not increased, it was decreased from 50mg to 25mg.  Reminded her to watch for symptoms of low BP with reduced salt intake

## 2023-03-28 ENCOUNTER — OFFICE VISIT (OUTPATIENT)
Dept: FAMILY MEDICINE CLINIC | Age: 88
End: 2023-03-28

## 2023-03-28 ENCOUNTER — TELEPHONE (OUTPATIENT)
Dept: FAMILY MEDICINE CLINIC | Age: 88
End: 2023-03-28

## 2023-03-28 VITALS
WEIGHT: 124 LBS | OXYGEN SATURATION: 98 % | SYSTOLIC BLOOD PRESSURE: 132 MMHG | TEMPERATURE: 97.1 F | DIASTOLIC BLOOD PRESSURE: 59 MMHG | RESPIRATION RATE: 16 BRPM | BODY MASS INDEX: 23.41 KG/M2 | HEIGHT: 61 IN

## 2023-03-28 DIAGNOSIS — L03.114 CELLULITIS OF LEFT UPPER EXTREMITY: Primary | ICD-10-CM

## 2023-03-28 RX ORDER — DOXYCYCLINE HYCLATE 100 MG
100 TABLET ORAL 2 TIMES DAILY
Qty: 14 TABLET | Refills: 0 | Status: SHIPPED | OUTPATIENT
Start: 2023-03-28 | End: 2023-04-04

## 2023-03-28 NOTE — PROGRESS NOTES
S: 81 y.o. female with   Chief Complaint   Patient presents with    Wound Infection     Left lateral elbow       Wound noticed over the weekend, +draining, +warm, +tender  Recently finished at wound care clinic for 2 different wounds  +trouble with wound healing    O: VS:  height is 5' 1\" (1.549 m) and weight is 124 lb (56.2 kg). Her temporal temperature is 97.1 °F (36.2 °C). Her blood pressure is 132/59 (abnormal). Her respiration is 16 and oxygen saturation is 98%. BP Readings from Last 3 Encounters:   03/28/23 (!) 132/59   03/20/23 100/62   02/22/23 (!) 155/48     See resident note  See media, +warmth    Impression/Plan:   1) Cellulitis with superficial abrasion: Doxy 100mg BID, local wound care, monitor closely, with history may need to go back to wound clinic; NOT related to blood draw from last week    RTC 1 week  Patient to call with any worsening prior      Health Maintenance Due   Topic Date Due    Shingles vaccine (1 of 2) Never done    COVID-19 Vaccine (4 - Booster for [employer] series) 11/25/2021         Attending Physician Statement  I have discussed the case, including pertinent history and exam findings with the resident. I agree with the documented assessment and plan.       Jackie Calix MD

## 2023-03-28 NOTE — TELEPHONE ENCOUNTER
Patient states she had blood drawn from the left antecubital following her appointment on 3/20/23. The site is now red/swollen and painful. She is able to move it normally. It also began bleeding yesterday (minimal), and she has been covering it with a bandaid. Patient does not drive and has no one to transport her to the office today.

## 2023-03-28 NOTE — PROGRESS NOTES
[facility]  Department of Family Medicine  Family Medicine Residency Program      Patient: Tiffany Loco 81 y.o. female     Date of Service: 3/28/23      Chief complaint:   Chief Complaint   Patient presents with    Wound Infection     Left lateral elbow       HISTORY OF PRESENTING ILLNESS     81 y.o. female presented to the clinic     Left upper arm wound  Patient states that she noticed wound on her left upper arm on 3/20. Patient states that this was from blood draw on 3/20/2023, however, the rash is not located in the antecubital fossa. Patient's friend stated that she frequently bumps into objects and gets cut easily. Reports worsening swelling, redness, tenderness  Denies fevers, chills, nausea, vomiting, decreased appetite, rash in other part of body, purulent drainage, bleeding  Patient has been seeing wound care (2/22/2023) for poor wound healing of right knee cellulitis and right forearm      Health Maintenance:  Health Maintenance Due   Topic Date Due    Shingles vaccine (1 of 2) Never done    COVID-19 Vaccine (4 - Booster for [employer] series) 11/25/2021     Past Medical History:      Diagnosis Date    Aortic stenosis     COPD (chronic obstructive pulmonary disease) (HCC)     Disorder of bone     Elevated hemoglobin A1c     Fatigue     Hyperlipidemia     Hypertension     Open wound of right forearm 2/8/2023    Skin only    Smoker     Traumatic ulcer of lower leg, right, with fat layer exposed ([address] 3/28/2018    Wound of right leg      Past Surgical History:    No past surgical history on file. Allergies:    Patient has no known allergies. Social History:   Social History     Socioeconomic History    Marital status:       Spouse name: Not on file    Number of children: Not on file    Years of education: Not on file    Highest education level: Not on file   Occupational History    Not on file   Tobacco Use    Smoking status: Every Day     Packs/day: 0.50     Types:

## 2023-03-29 ENCOUNTER — TELEPHONE (OUTPATIENT)
Dept: FAMILY MEDICINE CLINIC | Age: 88
End: 2023-03-29

## 2023-03-29 DIAGNOSIS — F41.1 GENERALIZED ANXIETY DISORDER: ICD-10-CM

## 2023-03-29 DIAGNOSIS — E78.2 MIXED HYPERLIPIDEMIA: ICD-10-CM

## 2023-03-29 DIAGNOSIS — F51.01 PRIMARY INSOMNIA: ICD-10-CM

## 2023-03-29 RX ORDER — ROSUVASTATIN CALCIUM 10 MG/1
10 TABLET, COATED ORAL DAILY
Qty: 90 TABLET | Refills: 1 | Status: SHIPPED | OUTPATIENT
Start: 2023-03-29

## 2023-03-29 RX ORDER — ALPRAZOLAM 0.5 MG/1
0.5 TABLET ORAL NIGHTLY PRN
Qty: 30 TABLET | Refills: 0 | Status: SHIPPED | OUTPATIENT
Start: 2023-03-29 | End: 2023-04-28

## 2023-03-29 ASSESSMENT — ENCOUNTER SYMPTOMS
SHORTNESS OF BREATH: 0
COUGH: 0
ABDOMINAL PAIN: 0
VOMITING: 0
DIARRHEA: 0

## 2023-03-29 NOTE — TELEPHONE ENCOUNTER
Last Appointment   3/28/2023  Next Appointment  4/6/2023  Needs refill of Rosuvastatin 10 mg, alprazolam 0.5 mg sent to [facility] on [address] Patient is almost out.

## 2023-04-13 ENCOUNTER — OFFICE VISIT (OUTPATIENT)
Dept: FAMILY MEDICINE CLINIC | Age: 88
End: 2023-04-13
Payer: MEDICARE

## 2023-04-13 VITALS
SYSTOLIC BLOOD PRESSURE: 100 MMHG | HEART RATE: 65 BPM | TEMPERATURE: 97.2 F | WEIGHT: 122 LBS | RESPIRATION RATE: 16 BRPM | HEIGHT: 61 IN | DIASTOLIC BLOOD PRESSURE: 53 MMHG | BODY MASS INDEX: 23.03 KG/M2 | OXYGEN SATURATION: 96 %

## 2023-04-13 DIAGNOSIS — L30.9 DERMATITIS: Primary | ICD-10-CM

## 2023-04-13 PROCEDURE — 1123F ACP DISCUSS/DSCN MKR DOCD: CPT

## 2023-04-13 PROCEDURE — 99213 OFFICE O/P EST LOW 20 MIN: CPT

## 2023-04-13 RX ORDER — PREDNISONE 10 MG/1
TABLET ORAL
Qty: 42 TABLET | Refills: 0 | Status: SHIPPED | OUTPATIENT
Start: 2023-04-13 | End: 2023-04-14 | Stop reason: SDUPTHER

## 2023-04-13 RX ORDER — PETROLATUM 42 G/100G
OINTMENT TOPICAL
Qty: 100 G | Refills: 0 | Status: SHIPPED | OUTPATIENT
Start: 2023-04-13

## 2023-04-16 ASSESSMENT — ENCOUNTER SYMPTOMS
EYE REDNESS: 0
EYE DISCHARGE: 0
BLOOD IN STOOL: 0
CHEST TIGHTNESS: 0
NAUSEA: 0
COUGH: 0
SINUS PAIN: 0
ABDOMINAL PAIN: 0
VOMITING: 0
DIARRHEA: 0
SHORTNESS OF BREATH: 0
EYE PAIN: 0
SINUS PRESSURE: 0

## 2023-04-21 ENCOUNTER — TELEPHONE (OUTPATIENT)
Dept: FAMILY MEDICINE CLINIC | Age: 88
End: 2023-04-21

## 2023-04-24 NOTE — TELEPHONE ENCOUNTER
Insurance won't pay for the type of compression stockings she needs. Is she or a friend/family able to  a couple pairs from a medical supply company like [employer]? Only needs 10-15 mmHg of pressure. Put them on the morning and take off prior to bed. Thanks.

## 2023-04-25 ENCOUNTER — APPOINTMENT (OUTPATIENT)
Dept: GENERAL RADIOLOGY | Age: 88
End: 2023-04-25
Payer: MEDICARE

## 2023-04-25 ENCOUNTER — HOSPITAL ENCOUNTER (EMERGENCY)
Age: 88
Discharge: HOME | End: 2023-04-25
Attending: EMERGENCY MEDICINE
Payer: MEDICARE

## 2023-04-25 ENCOUNTER — TELEPHONE (OUTPATIENT)
Dept: FAMILY MEDICINE CLINIC | Age: 88
End: 2023-04-25

## 2023-04-25 VITALS
WEIGHT: 130 LBS | HEART RATE: 57 BPM | BODY MASS INDEX: 24.56 KG/M2 | RESPIRATION RATE: 15 BRPM | OXYGEN SATURATION: 98 % | DIASTOLIC BLOOD PRESSURE: 62 MMHG | TEMPERATURE: 98.3 F | SYSTOLIC BLOOD PRESSURE: 118 MMHG

## 2023-04-25 DIAGNOSIS — R60.9 PERIPHERAL EDEMA: Primary | ICD-10-CM

## 2023-04-25 LAB
ALBUMIN SERPL-MCNC: 3.6 G/DL (ref 3.5–5.2)
ALP SERPL-CCNC: 88 U/L (ref 35–104)
ALT SERPL-CCNC: 107 U/L (ref 0–32)
ANION GAP SERPL CALCULATED.3IONS-SCNC: 8 MMOL/L (ref 7–16)
AST SERPL-CCNC: 74 U/L (ref 0–31)
BASOPHILS # BLD: 0.02 E9/L (ref 0–0.2)
BASOPHILS NFR BLD: 0.1 % (ref 0–2)
BILIRUB SERPL-MCNC: 0.3 MG/DL (ref 0–1.2)
BNP BLD-MCNC: 898 PG/ML (ref 0–450)
BUN SERPL-MCNC: 32 MG/DL (ref 6–23)
CALCIUM SERPL-MCNC: 8.2 MG/DL (ref 8.6–10.2)
CHLORIDE SERPL-SCNC: 112 MMOL/L (ref 98–107)
CO2 SERPL-SCNC: 25 MMOL/L (ref 22–29)
CREAT SERPL-MCNC: 1.3 MG/DL (ref 0.5–1)
EOSINOPHIL # BLD: 0 E9/L (ref 0.05–0.5)
EOSINOPHIL NFR BLD: 0 % (ref 0–6)
ERYTHROCYTE [DISTWIDTH] IN BLOOD BY AUTOMATED COUNT: 14.1 FL (ref 11.5–15)
GLUCOSE SERPL-MCNC: 82 MG/DL (ref 74–99)
HCT VFR BLD AUTO: 39.2 % (ref 34–48)
HGB BLD-MCNC: 12.6 G/DL (ref 11.5–15.5)
IMM GRANULOCYTES # BLD: 0.08 E9/L
IMM GRANULOCYTES NFR BLD: 0.5 % (ref 0–5)
LYMPHOCYTES # BLD: 4.48 E9/L (ref 1.5–4)
LYMPHOCYTES NFR BLD: 30.4 % (ref 20–42)
MCH RBC QN AUTO: 32.7 PG (ref 26–35)
MCHC RBC AUTO-ENTMCNC: 32.1 % (ref 32–34.5)
MCV RBC AUTO: 101.8 FL (ref 80–99.9)
MONOCYTES # BLD: 1.37 E9/L (ref 0.1–0.95)
MONOCYTES NFR BLD: 9.3 % (ref 2–12)
NEUTROPHILS # BLD: 8.78 E9/L (ref 1.8–7.3)
NEUTS SEG NFR BLD: 59.7 % (ref 43–80)
PLATELET # BLD AUTO: 206 E9/L (ref 130–450)
PMV BLD AUTO: 13.9 FL (ref 7–12)
POTASSIUM SERPL-SCNC: 4.5 MMOL/L (ref 3.5–5)
PROT SERPL-MCNC: 5.6 G/DL (ref 6.4–8.3)
RBC # BLD AUTO: 3.85 E12/L (ref 3.5–5.5)
REASON FOR REJECTION: NORMAL
REJECTED TEST: NORMAL
SODIUM SERPL-SCNC: 145 MMOL/L (ref 132–146)
TROPONIN, HIGH SENSITIVITY: 27 NG/L (ref 0–9)
TROPONIN, HIGH SENSITIVITY: 27 NG/L (ref 0–9)
WBC # BLD: 14.7 E9/L (ref 4.5–11.5)

## 2023-04-25 PROCEDURE — 71045 X-RAY EXAM CHEST 1 VIEW: CPT

## 2023-04-25 PROCEDURE — 85025 COMPLETE CBC W/AUTO DIFF WBC: CPT

## 2023-04-25 PROCEDURE — 99285 EMERGENCY DEPT VISIT HI MDM: CPT

## 2023-04-25 PROCEDURE — 83880 ASSAY OF NATRIURETIC PEPTIDE: CPT

## 2023-04-25 PROCEDURE — 84484 ASSAY OF TROPONIN QUANT: CPT

## 2023-04-25 PROCEDURE — 93005 ELECTROCARDIOGRAM TRACING: CPT

## 2023-04-25 PROCEDURE — 80053 COMPREHEN METABOLIC PANEL: CPT

## 2023-04-25 RX ORDER — FUROSEMIDE 20 MG/1
20 TABLET ORAL DAILY
Qty: 60 TABLET | Refills: 0 | Status: SHIPPED | OUTPATIENT
Start: 2023-04-25

## 2023-04-25 ASSESSMENT — PAIN - FUNCTIONAL ASSESSMENT: PAIN_FUNCTIONAL_ASSESSMENT: NONE - DENIES PAIN

## 2023-04-25 NOTE — ED PROVIDER NOTES
Department of Emergency Medicine   ED Provider Note  Admit Date/RoomTime: 4/25/2023  5:47 PM  ED Room: [place] [address]          History of Present Illness:  4/25/23, Time: 6:11 PM EDT       Elroy Vee is a 81 y.o. female presenting to the ED for BLE edema the past 3-4 days. Daughter at bedside notes it has been worsening. She denies any pain associated. Patient reports she is on a low-sodium diet in his been sticking to that well lately however she does report that she sits a lot in a chair with her legs down. She denies any injury to the area, no cough, chest pain, fevers, chills, sinus pain or pressure, sore throat, abdominal pain, nausea, vomiting, diarrhea, dysuria. Patient denying any worsening shortness of breath however daughter at bedside does report she feels like she may be having more shortness of breath than baseline as she does have baseline shortness of breath and still smokes. Additional history obtained from daughter at bedside    Review of Systems:     Pertinent positives and negatives are stated within HPI.      --------------------------------------------- PAST HISTORY ---------------------------------------------  Past Medical History:  has a past medical history of Aortic stenosis, COPD (chronic obstructive pulmonary disease) ([facility] Disorder of bone, Elevated hemoglobin A1c, Fatigue, Hyperlipidemia, Hypertension, Open wound of right forearm, Smoker, Traumatic ulcer of lower leg, right, with fat layer exposed ([facility] and Wound of right leg. Past Surgical History:  has no past surgical history on file. Social History:  reports that she has been smoking cigarettes. She has been smoking an average of .5 packs per day. She has never used smokeless tobacco. She reports that she does not drink alcohol and does not use drugs. Family History: family history includes No Known Problems in her brother and mother; Stroke in her father.      The patients home medications have been

## 2023-04-25 NOTE — TELEPHONE ENCOUNTER
Pt triaged in by [address] [place] [address],[address]    Was speaking with Lakisha Larry (friend) who was with pt, states she is having swelling in feet and ankles, mostly in the ankles. Her ankles are double the normal size and swelling goes out past her shoes so it looks like its \"hanging\" over edge of shoes. Jose Smith says that the ankles and feet are red and a little warm to touch. Pt and friend informed that she needs to be seen in ED to be evaluated.  Lakisha Larry states she will inform pt daughter and have her take her to ED because she is unable to take her to the hospital.

## 2023-04-26 LAB
EKG ATRIAL RATE: 51 BPM
EKG P AXIS: 54 DEGREES
EKG P-R INTERVAL: 118 MS
EKG Q-T INTERVAL: 444 MS
EKG QRS DURATION: 68 MS
EKG QTC CALCULATION (BAZETT): 409 MS
EKG R AXIS: 57 DEGREES
EKG T AXIS: 32 DEGREES
EKG VENTRICULAR RATE: 51 BPM

## 2023-04-26 PROCEDURE — 93010 ELECTROCARDIOGRAM REPORT: CPT | Performed by: INTERNAL MEDICINE

## 2023-04-28 ENCOUNTER — OFFICE VISIT (OUTPATIENT)
Dept: FAMILY MEDICINE CLINIC | Age: 88
End: 2023-04-28
Payer: MEDICARE

## 2023-04-28 VITALS
SYSTOLIC BLOOD PRESSURE: 110 MMHG | HEART RATE: 62 BPM | TEMPERATURE: 97 F | OXYGEN SATURATION: 91 % | HEIGHT: 61 IN | BODY MASS INDEX: 22.73 KG/M2 | DIASTOLIC BLOOD PRESSURE: 52 MMHG | WEIGHT: 120.4 LBS

## 2023-04-28 DIAGNOSIS — D72.829 LEUKOCYTOSIS, UNSPECIFIED TYPE: ICD-10-CM

## 2023-04-28 DIAGNOSIS — I35.0 MODERATE AORTIC STENOSIS: ICD-10-CM

## 2023-04-28 DIAGNOSIS — N17.9 AKI (ACUTE KIDNEY INJURY) (HCC): ICD-10-CM

## 2023-04-28 DIAGNOSIS — I51.89 DIASTOLIC DYSFUNCTION: ICD-10-CM

## 2023-04-28 DIAGNOSIS — R60.0 BILATERAL LOWER EXTREMITY EDEMA: Primary | ICD-10-CM

## 2023-04-28 PROBLEM — R26.2 DISABILITY OF WALKING: Status: ACTIVE | Noted: 2019-03-18

## 2023-04-28 PROBLEM — H02.109 ECTROPION OF LOWER EYELID: Status: ACTIVE | Noted: 2022-11-19

## 2023-04-28 PROBLEM — I73.9 PERIPHERAL VASCULAR DISEASE (HCC): Status: ACTIVE | Noted: 2019-03-18

## 2023-04-28 PROBLEM — M79.609 PAIN IN LIMB: Status: ACTIVE | Noted: 2019-03-18

## 2023-04-28 PROBLEM — H25.10 NUCLEAR SCLEROTIC CATARACT: Status: ACTIVE | Noted: 2023-04-28

## 2023-04-28 PROBLEM — B35.1 ONYCHOMYCOSIS: Status: ACTIVE | Noted: 2019-03-18

## 2023-04-28 LAB
ALBUMIN SERPL-MCNC: 4 G/DL (ref 3.5–5.2)
ALP SERPL-CCNC: 100 U/L (ref 35–104)
ALT SERPL-CCNC: 76 U/L (ref 0–32)
ANION GAP SERPL CALCULATED.3IONS-SCNC: 12 MMOL/L (ref 7–16)
AST SERPL-CCNC: 34 U/L (ref 0–31)
BASOPHILS # BLD: 0.03 E9/L (ref 0–0.2)
BASOPHILS NFR BLD: 0.2 % (ref 0–2)
BILIRUB SERPL-MCNC: 0.5 MG/DL (ref 0–1.2)
BUN SERPL-MCNC: 29 MG/DL (ref 6–23)
CALCIUM SERPL-MCNC: 9.1 MG/DL (ref 8.6–10.2)
CHLORIDE SERPL-SCNC: 100 MMOL/L (ref 98–107)
CO2 SERPL-SCNC: 27 MMOL/L (ref 22–29)
CREAT SERPL-MCNC: 1.4 MG/DL (ref 0.5–1)
EOSINOPHIL # BLD: 0 E9/L (ref 0.05–0.5)
EOSINOPHIL NFR BLD: 0 % (ref 0–6)
ERYTHROCYTE [DISTWIDTH] IN BLOOD BY AUTOMATED COUNT: 13.4 FL (ref 11.5–15)
GLUCOSE SERPL-MCNC: 70 MG/DL (ref 74–99)
HCT VFR BLD AUTO: 39.6 % (ref 34–48)
HGB BLD-MCNC: 12.7 G/DL (ref 11.5–15.5)
IMM GRANULOCYTES # BLD: 0.07 E9/L
IMM GRANULOCYTES NFR BLD: 0.4 % (ref 0–5)
LYMPHOCYTES # BLD: 3.43 E9/L (ref 1.5–4)
LYMPHOCYTES NFR BLD: 21.6 % (ref 20–42)
MCH RBC QN AUTO: 32.5 PG (ref 26–35)
MCHC RBC AUTO-ENTMCNC: 32.1 % (ref 32–34.5)
MCV RBC AUTO: 101.3 FL (ref 80–99.9)
MONOCYTES # BLD: 1.36 E9/L (ref 0.1–0.95)
MONOCYTES NFR BLD: 8.6 % (ref 2–12)
NEUTROPHILS # BLD: 11.01 E9/L (ref 1.8–7.3)
NEUTS SEG NFR BLD: 69.2 % (ref 43–80)
PLATELET # BLD AUTO: 219 E9/L (ref 130–450)
PMV BLD AUTO: 14.5 FL (ref 7–12)
POTASSIUM SERPL-SCNC: 4 MMOL/L (ref 3.5–5)
PROT SERPL-MCNC: 6.7 G/DL (ref 6.4–8.3)
RBC # BLD AUTO: 3.91 E12/L (ref 3.5–5.5)
SODIUM SERPL-SCNC: 139 MMOL/L (ref 132–146)
WBC # BLD: 15.9 E9/L (ref 4.5–11.5)

## 2023-04-28 PROCEDURE — 99214 OFFICE O/P EST MOD 30 MIN: CPT | Performed by: FAMILY MEDICINE

## 2023-04-28 PROCEDURE — 1123F ACP DISCUSS/DSCN MKR DOCD: CPT | Performed by: FAMILY MEDICINE

## 2023-05-01 DIAGNOSIS — F51.01 PRIMARY INSOMNIA: ICD-10-CM

## 2023-05-01 DIAGNOSIS — F41.1 GENERALIZED ANXIETY DISORDER: ICD-10-CM

## 2023-05-02 RX ORDER — ALPRAZOLAM 0.5 MG/1
0.5 TABLET ORAL NIGHTLY PRN
Qty: 30 TABLET | Refills: 1 | Status: SHIPPED | OUTPATIENT
Start: 2023-05-02 | End: 2023-07-01

## 2023-05-05 ENCOUNTER — OFFICE VISIT (OUTPATIENT)
Dept: FAMILY MEDICINE CLINIC | Age: 88
End: 2023-05-05
Payer: MEDICARE

## 2023-05-05 VITALS
HEART RATE: 53 BPM | BODY MASS INDEX: 22.09 KG/M2 | TEMPERATURE: 97.2 F | DIASTOLIC BLOOD PRESSURE: 57 MMHG | HEIGHT: 61 IN | WEIGHT: 117 LBS | SYSTOLIC BLOOD PRESSURE: 116 MMHG

## 2023-05-05 DIAGNOSIS — D72.829 LEUKOCYTOSIS, UNSPECIFIED TYPE: ICD-10-CM

## 2023-05-05 DIAGNOSIS — N17.9 AKI (ACUTE KIDNEY INJURY) (HCC): ICD-10-CM

## 2023-05-05 DIAGNOSIS — R63.4 WEIGHT DECREASE: Primary | ICD-10-CM

## 2023-05-05 PROCEDURE — 1123F ACP DISCUSS/DSCN MKR DOCD: CPT | Performed by: STUDENT IN AN ORGANIZED HEALTH CARE EDUCATION/TRAINING PROGRAM

## 2023-05-05 PROCEDURE — 99213 OFFICE O/P EST LOW 20 MIN: CPT | Performed by: STUDENT IN AN ORGANIZED HEALTH CARE EDUCATION/TRAINING PROGRAM

## 2023-05-05 ASSESSMENT — ENCOUNTER SYMPTOMS
SINUS PAIN: 0
WHEEZING: 0
SORE THROAT: 0
NAUSEA: 0
COUGH: 0
SHORTNESS OF BREATH: 0
EYE PAIN: 0
VOMITING: 0
ABDOMINAL PAIN: 0

## 2023-05-05 NOTE — PROGRESS NOTES
[facility]      Department of Family Medicine  Family Medicine Residency  Phone: [phone]   Fax: [phone]    5/5/23    Ed Munoz is a 81 y.o. female presenting to the outpatient clinic for:  Chief Complaint   Patient presents with    Abnormal Test Results      Accompanied by friend, Liza Mcmahon    HPI:    Elevated Cr on CKD  Baseline 1.1-1.2  Elevated in ED and then worsening to 1.4 last week on lab draw  Admits to poor food intake daily  States she will sometimes eat a PB sandwich  Lives with her daughter but her daughter and family are vegan and she does not like their food that they make  Admits to decreased appetite   Drinks 3 cups of water  Denies difficulty urinating    Decreased appetite  Denies abdominal pain  Has been a while  Limited diet interests   Does not have meal delivery service  Not allowed to use stove at her house currently  Occasionally eats PB sandwich  Does not like vegetables  Likes meat but no meat is in her house  Has lost 3 lbs in last week-- was not fluid overloaded anymore last week and is dry this week so probably not from water  Denies syncope, presyncope, dizziness    Leukocytosis  No known source of infection  Denies fever, chills, congestion, cough, sore throat, abdominal pain, diarrhea, dysuria, open wounds        BP Readings from Last 3 Encounters:   05/05/23 (P) 111/65   04/28/23 (!) 110/52   04/25/23 118/62      No Known Allergies    Past Medical & Surgical History:      Diagnosis Date    Aortic stenosis     COPD (chronic obstructive pulmonary disease) ([facility]     Disorder of bone     Elevated hemoglobin A1c     Fatigue     Hyperlipidemia     Hypertension     Open wound of right forearm 2/8/2023    Skin only    Smoker     Traumatic ulcer of lower leg, right, with fat layer exposed ([address] 3/28/2018    Wound of right leg      No past surgical history on file.     Family History:      Problem Relation Age of Onset    No Known Problems Mother

## 2023-05-05 NOTE — PROGRESS NOTES
[facility]  Precepting Note    Subjective:  ER follow up- seen for edema  Had elevated creatinine and leukocytosis  Baseline creatinine - 1.1- 1.2- now 1.4  No symptoms    ROS otherwise negative     Past medical, surgical, family and social history were reviewed, non-contributory, and unchanged unless otherwise stated. Objective:    BP (P) 111/65   Pulse (P) 54   Temp 97.2 °F (36.2 °C) (Temporal)   Ht 5' 1\" (1.549 m)   Wt 117 lb (53.1 kg)   BMI 22.11 kg/m²     Exam is as noted by resident with the following changes, additions or corrections:    General:  NAD; alert & oriented x 3   Heart:  RRR, no murmurs, gallops, or rubs. Lungs:  CTA bilaterally, no wheeze, rales or rhonchi  Abd: bowel sounds present, nontender, nondistended, no masses  Extrem:  No clubbing, cyanosis, or edema    Assessment/Plan:  Leukocytosis: asymptomatic. Recheck  Elevated creatinine: advised hydration. Recheck  F/u as instructed     Attending Physician Statement  I have reviewed the chart, including any radiology or labs. I have discussed the case, including pertinent history and exam findings with the resident. I agree with the assessment, plan and orders as documented by the resident. Please refer to the resident note for additional information.       Electronically signed by Estrella Melendrez MD on 5/5/2023 at 10:02 AM

## 2023-05-11 ENCOUNTER — HOSPITAL ENCOUNTER (OUTPATIENT)
Age: 88
Discharge: HOME | End: 2023-05-11
Payer: MEDICARE

## 2023-05-11 LAB
ANION GAP SERPL CALCULATED.3IONS-SCNC: 14 MMOL/L (ref 7–16)
BUN SERPL-MCNC: 42 MG/DL (ref 6–23)
CALCIUM SERPL-MCNC: 9.5 MG/DL (ref 8.6–10.2)
CHLORIDE SERPL-SCNC: 105 MMOL/L (ref 98–107)
CO2 SERPL-SCNC: 24 MMOL/L (ref 22–29)
CREAT SERPL-MCNC: 1.2 MG/DL (ref 0.5–1)
GLUCOSE SERPL-MCNC: 78 MG/DL (ref 74–99)
POTASSIUM SERPL-SCNC: 4.6 MMOL/L (ref 3.5–5)
SODIUM SERPL-SCNC: 143 MMOL/L (ref 132–146)

## 2023-05-11 PROCEDURE — 36415 COLL VENOUS BLD VENIPUNCTURE: CPT

## 2023-05-11 PROCEDURE — 80048 BASIC METABOLIC PNL TOTAL CA: CPT

## 2023-05-12 NOTE — RESULT ENCOUNTER NOTE
Please let patient know that her kidney function has returned to her baseline.  However, it is imperative that she continues with fluid and food intake daily even though it is back to normal. Thank you

## 2023-05-15 ENCOUNTER — HOSPITAL ENCOUNTER (OUTPATIENT)
Age: 88
Discharge: HOME | End: 2023-05-15
Payer: MEDICARE

## 2023-05-15 DIAGNOSIS — D72.829 LEUKOCYTOSIS, UNSPECIFIED TYPE: ICD-10-CM

## 2023-05-15 DIAGNOSIS — N17.9 AKI (ACUTE KIDNEY INJURY) (HCC): ICD-10-CM

## 2023-05-15 DIAGNOSIS — R63.4 WEIGHT DECREASE: ICD-10-CM

## 2023-05-15 LAB
ANION GAP SERPL CALCULATED.3IONS-SCNC: 12 MMOL/L (ref 7–16)
BASOPHILS # BLD: 0.04 E9/L (ref 0–0.2)
BASOPHILS NFR BLD: 0.7 % (ref 0–2)
BUN SERPL-MCNC: 39 MG/DL (ref 6–23)
CALCIUM SERPL-MCNC: 8.9 MG/DL (ref 8.6–10.2)
CHLORIDE SERPL-SCNC: 104 MMOL/L (ref 98–107)
CO2 SERPL-SCNC: 24 MMOL/L (ref 22–29)
CREAT SERPL-MCNC: 1.2 MG/DL (ref 0.5–1)
EOSINOPHIL # BLD: 0 E9/L (ref 0.05–0.5)
EOSINOPHIL NFR BLD: 0 % (ref 0–6)
ERYTHROCYTE [DISTWIDTH] IN BLOOD BY AUTOMATED COUNT: 12.9 FL (ref 11.5–15)
GLUCOSE SERPL-MCNC: 90 MG/DL (ref 74–99)
HCT VFR BLD AUTO: 40 % (ref 34–48)
HGB BLD-MCNC: 12.5 G/DL (ref 11.5–15.5)
IMM GRANULOCYTES # BLD: 0.03 E9/L
IMM GRANULOCYTES NFR BLD: 0.5 % (ref 0–5)
LYMPHOCYTES # BLD: 1.94 E9/L (ref 1.5–4)
LYMPHOCYTES NFR BLD: 31.9 % (ref 20–42)
MCH RBC QN AUTO: 32.3 PG (ref 26–35)
MCHC RBC AUTO-ENTMCNC: 31.3 % (ref 32–34.5)
MCV RBC AUTO: 103.4 FL (ref 80–99.9)
MONOCYTES # BLD: 0.57 E9/L (ref 0.1–0.95)
MONOCYTES NFR BLD: 9.4 % (ref 2–12)
NEUTROPHILS # BLD: 3.5 E9/L (ref 1.8–7.3)
NEUTS SEG NFR BLD: 57.5 % (ref 43–80)
PLATELET # BLD AUTO: 192 E9/L (ref 130–450)
PMV BLD AUTO: 13.3 FL (ref 7–12)
POTASSIUM SERPL-SCNC: 4.4 MMOL/L (ref 3.5–5)
RBC # BLD AUTO: 3.87 E12/L (ref 3.5–5.5)
SODIUM SERPL-SCNC: 140 MMOL/L (ref 132–146)
WBC # BLD: 6.1 E9/L (ref 4.5–11.5)

## 2023-05-15 PROCEDURE — 85025 COMPLETE CBC W/AUTO DIFF WBC: CPT

## 2023-05-15 PROCEDURE — 36415 COLL VENOUS BLD VENIPUNCTURE: CPT

## 2023-05-15 PROCEDURE — 80048 BASIC METABOLIC PNL TOTAL CA: CPT

## 2023-05-18 ENCOUNTER — CARE COORDINATION (OUTPATIENT)
Dept: CARE COORDINATION | Age: 88
End: 2023-05-18

## 2023-05-18 NOTE — CARE COORDINATION
Daughter Abdirahman Mendes (HIPAA) returned call. [facility] explained role and reasoning for call - to offer Care Coordination. Patient declined at this time. ACM encouraged patient to contact ACM if she feels she needs assistance managing her health care needs. PLAN:  Patient will be temporarily excluded from Care Coordination d/t declined enrollment.

## 2023-05-26 ENCOUNTER — OFFICE VISIT (OUTPATIENT)
Dept: FAMILY MEDICINE CLINIC | Age: 88
End: 2023-05-26
Payer: MEDICARE

## 2023-05-26 VITALS
WEIGHT: 118 LBS | BODY MASS INDEX: 22.28 KG/M2 | DIASTOLIC BLOOD PRESSURE: 49 MMHG | HEIGHT: 61 IN | HEART RATE: 58 BPM | SYSTOLIC BLOOD PRESSURE: 107 MMHG

## 2023-05-26 DIAGNOSIS — F41.9 ANXIETY: ICD-10-CM

## 2023-05-26 DIAGNOSIS — Z76.0 MEDICATION REFILL: ICD-10-CM

## 2023-05-26 DIAGNOSIS — I95.9 HYPOTENSION, UNSPECIFIED HYPOTENSION TYPE: Primary | ICD-10-CM

## 2023-05-26 PROBLEM — N17.9 AKI (ACUTE KIDNEY INJURY) (HCC): Status: RESOLVED | Noted: 2023-04-28 | Resolved: 2023-05-26

## 2023-05-26 PROCEDURE — 99214 OFFICE O/P EST MOD 30 MIN: CPT | Performed by: FAMILY MEDICINE

## 2023-05-26 PROCEDURE — 1123F ACP DISCUSS/DSCN MKR DOCD: CPT | Performed by: FAMILY MEDICINE

## 2023-05-26 RX ORDER — ALPRAZOLAM 0.5 MG/1
0.5 TABLET ORAL NIGHTLY PRN
Qty: 30 TABLET | Refills: 0 | Status: SHIPPED | OUTPATIENT
Start: 2023-05-26 | End: 2023-07-25

## 2023-06-20 ENCOUNTER — OFFICE VISIT (OUTPATIENT)
Dept: FAMILY MEDICINE CLINIC | Age: 88
End: 2023-06-20
Payer: MEDICARE

## 2023-06-20 VITALS
HEART RATE: 58 BPM | DIASTOLIC BLOOD PRESSURE: 58 MMHG | TEMPERATURE: 97.5 F | HEIGHT: 61 IN | WEIGHT: 124 LBS | SYSTOLIC BLOOD PRESSURE: 110 MMHG | RESPIRATION RATE: 14 BRPM | OXYGEN SATURATION: 97 % | BODY MASS INDEX: 23.41 KG/M2

## 2023-06-20 DIAGNOSIS — R60.0 PEDAL EDEMA: ICD-10-CM

## 2023-06-20 DIAGNOSIS — R63.4 WEIGHT DECREASE: Primary | ICD-10-CM

## 2023-06-20 PROCEDURE — 1123F ACP DISCUSS/DSCN MKR DOCD: CPT | Performed by: FAMILY MEDICINE

## 2023-06-20 PROCEDURE — 99213 OFFICE O/P EST LOW 20 MIN: CPT | Performed by: FAMILY MEDICINE

## 2023-06-20 NOTE — PROGRESS NOTES
Subjective:  81 y.o. y/o female is here for follow up weight loss. Here with friend    +Weight loss (8lb in 1 month, 28lb in 1 year)  ++6-pound gain in past month--eating regularly now (frozen meals)  +states been lazy and doesn't want to make things, +doesn't want meals delivered or any other intervention  Thyroid normal, CBC unremarkable, no DM, kidney stable, liver OK  Did agree to CXR--negative  Last mammogram 5/17, denies any breast masses/tenderness  No changes in bowels, no blood in stool  +smoker    Continues to smoke, no desire to quit  +casinos, +bingo nights  Lives with daughter      Patient's past medical, surgical, social and/or family history reviewed, updated in chart, and are non-contributory (unless otherwise stated). Medications and allergies also reviewed and updated in chart. Review of Systems:  Constitutional:  No fever, no fatigue, no chills, +occ headaches, + weight change  Dermatology:  No rash, no mole, + dry or sensitive skin, +picking at hands (when nervous)  ENT:  No cough, no sore throat, no sinus pain, no runny nose, no ear pain  Cardiology:  No chest pain, no palpitations, + leg edema (feet), no shortness of breath, no PND  Gastroenterology:  No dysphagia, no abdominal pain, no nausea, no vomiting, no constipation, no diarrhea, no heartburn  Musculoskeletal:  + joint pain, no leg cramps, no back pain, no muscle aches  Neuro:  No dizziness, no numbness/tingling, no lightheadedness  Respiratory:  No shortness of breath, no orthopnea, no wheezing, no LINARES  Urology:  No blood in the urine, no urinary frequency, no urinary incontinence, no urinary urgency, no nocturia, no dysuria    Vitals:    06/20/23 1044   BP: (!) 110/58   Pulse: 58   Resp: 14   Temp: 97.5 °F (36.4 °C)   TempSrc: Temporal   SpO2: 97%   Weight: 124 lb (56.2 kg)   Height: 5' 1\" (1.549 m)     Body mass index is 23.43 kg/m².     General:  Patient alert and oriented x 3, NAD, pleasant, normal

## 2023-06-26 ENCOUNTER — HOSPITAL ENCOUNTER (EMERGENCY)
Age: 88
Discharge: HOME | End: 2023-06-26
Payer: MEDICARE

## 2023-06-26 ENCOUNTER — TELEPHONE (OUTPATIENT)
Dept: FAMILY MEDICINE CLINIC | Age: 88
End: 2023-06-26

## 2023-06-26 ENCOUNTER — APPOINTMENT (OUTPATIENT)
Dept: GENERAL RADIOLOGY | Age: 88
End: 2023-06-26
Payer: MEDICARE

## 2023-06-26 ENCOUNTER — APPOINTMENT (OUTPATIENT)
Dept: CT IMAGING | Age: 88
End: 2023-06-26
Payer: MEDICARE

## 2023-06-26 VITALS
BODY MASS INDEX: 22.08 KG/M2 | TEMPERATURE: 97.8 F | DIASTOLIC BLOOD PRESSURE: 79 MMHG | HEIGHT: 62 IN | WEIGHT: 120 LBS | RESPIRATION RATE: 16 BRPM | OXYGEN SATURATION: 98 % | SYSTOLIC BLOOD PRESSURE: 114 MMHG | HEART RATE: 87 BPM

## 2023-06-26 DIAGNOSIS — T14.8XXA MULTIPLE SKIN TEARS: ICD-10-CM

## 2023-06-26 DIAGNOSIS — S09.90XA CLOSED HEAD INJURY, INITIAL ENCOUNTER: Primary | ICD-10-CM

## 2023-06-26 PROCEDURE — 99284 EMERGENCY DEPT VISIT MOD MDM: CPT

## 2023-06-26 PROCEDURE — 90714 TD VACC NO PRESV 7 YRS+ IM: CPT | Performed by: NURSE PRACTITIONER

## 2023-06-26 PROCEDURE — 6360000002 HC RX W HCPCS: Performed by: NURSE PRACTITIONER

## 2023-06-26 PROCEDURE — 73590 X-RAY EXAM OF LOWER LEG: CPT

## 2023-06-26 PROCEDURE — 90471 IMMUNIZATION ADMIN: CPT | Performed by: NURSE PRACTITIONER

## 2023-06-26 PROCEDURE — 70450 CT HEAD/BRAIN W/O DYE: CPT

## 2023-06-26 PROCEDURE — 72125 CT NECK SPINE W/O DYE: CPT

## 2023-06-26 RX ORDER — BACITRACIN ZINC 500 [USP'U]/G
OINTMENT TOPICAL ONCE
Status: DISCONTINUED | OUTPATIENT
Start: 2023-06-26 | End: 2023-06-26 | Stop reason: HOSPADM

## 2023-06-26 RX ORDER — TETANUS AND DIPHTHERIA TOXOIDS ADSORBED 2; 2 [LF]/.5ML; [LF]/.5ML
0.5 INJECTION INTRAMUSCULAR ONCE
Status: COMPLETED | OUTPATIENT
Start: 2023-06-26 | End: 2023-06-26

## 2023-06-26 RX ADMIN — TETANUS AND DIPHTHERIA TOXOIDS ADSORBED 0.5 ML: 2; 2 INJECTION INTRAMUSCULAR at 09:38

## 2023-06-26 ASSESSMENT — PAIN SCALES - GENERAL: PAINLEVEL_OUTOF10: 3

## 2023-06-26 ASSESSMENT — PAIN DESCRIPTION - LOCATION: LOCATION: LEG

## 2023-06-26 ASSESSMENT — PAIN - FUNCTIONAL ASSESSMENT: PAIN_FUNCTIONAL_ASSESSMENT: 0-10

## 2023-06-29 ENCOUNTER — OFFICE VISIT (OUTPATIENT)
Dept: FAMILY MEDICINE CLINIC | Age: 88
End: 2023-06-29
Payer: MEDICARE

## 2023-06-29 VITALS
WEIGHT: 119 LBS | TEMPERATURE: 97 F | HEART RATE: 53 BPM | SYSTOLIC BLOOD PRESSURE: 139 MMHG | BODY MASS INDEX: 21.9 KG/M2 | DIASTOLIC BLOOD PRESSURE: 86 MMHG | HEIGHT: 62 IN | RESPIRATION RATE: 16 BRPM

## 2023-06-29 DIAGNOSIS — Z51.89 VISIT FOR WOUND CHECK: Primary | ICD-10-CM

## 2023-06-29 PROCEDURE — 99213 OFFICE O/P EST LOW 20 MIN: CPT

## 2023-06-29 PROCEDURE — 1123F ACP DISCUSS/DSCN MKR DOCD: CPT

## 2023-07-11 ENCOUNTER — OFFICE VISIT (OUTPATIENT)
Dept: FAMILY MEDICINE CLINIC | Age: 88
End: 2023-07-11
Payer: MEDICARE

## 2023-07-11 VITALS
OXYGEN SATURATION: 97 % | RESPIRATION RATE: 16 BRPM | DIASTOLIC BLOOD PRESSURE: 52 MMHG | HEART RATE: 67 BPM | BODY MASS INDEX: 21.35 KG/M2 | HEIGHT: 62 IN | WEIGHT: 116 LBS | SYSTOLIC BLOOD PRESSURE: 100 MMHG | TEMPERATURE: 97.4 F

## 2023-07-11 DIAGNOSIS — Z51.89 VISIT FOR WOUND CHECK: Primary | ICD-10-CM

## 2023-07-11 DIAGNOSIS — R14.3 FLATULENCE: ICD-10-CM

## 2023-07-11 PROCEDURE — 1123F ACP DISCUSS/DSCN MKR DOCD: CPT

## 2023-07-11 PROCEDURE — 99213 OFFICE O/P EST LOW 20 MIN: CPT

## 2023-07-11 ASSESSMENT — ENCOUNTER SYMPTOMS
SHORTNESS OF BREATH: 0
NAUSEA: 0
ABDOMINAL PAIN: 0
DIARRHEA: 0
CONSTIPATION: 0
VOMITING: 0

## 2023-07-11 NOTE — PROGRESS NOTES
S[facility] Corral  Department of Family Medicine  Family Medicine Residency Program      Patient: Anna Montes 81 y.o. female     Date of Service: 7/10/23      Chief complaint:   Chief Complaint   Patient presents with    Leg Injury     Left leg/inner shin       HISTORY OF PRESENTING ILLNESS     81 y.o. female PMH CKD 3a, AS, COPD, HTN, HLD, insomnia, BASILIA, extremity wounds presented to the clinic for wound check. Patient fell while walking at home 6/26/23, struck head, evaluated in ED, doesn't use walker, skin tear to right elbow, left shin, right shin; received bacitracin zinc ointment, Td shot    Using petroleum jelly, saline, gauze, wrapping tight enough to reduce swelling and keep gauze in place    Daughter helps with wound care, reports wound seeping yellow blood-tinged fluid, foul odor; left ankle swelling, Lasix every other day for peripheral edema, denies fevers, chills, chest pain, SOB, streaking, warmth to wound. Told to drink Ensure but patient hasn't been because of increased flatulence, hasn't tried Gas X, isn't eating because of it, denies heart burn, indigestion, nausea, vomiting, constipation, diarrhea, change in bowel habits. Health Maintenance:  Health Maintenance Due   Topic Date Due    Shingles vaccine (1 of 2) Never done    COVID-19 Vaccine (4 - Booster for [place] series) 11/25/2021     Past Medical History:      Diagnosis Date    Aortic stenosis     COPD (chronic obstructive pulmonary disease) (HCC)     Disorder of bone     Elevated hemoglobin A1c     Fatigue     Hyperlipidemia     Hypertension     Open wound of right forearm 2/8/2023    Skin only    Smoker     Traumatic ulcer of lower leg, right, with fat layer exposed ([address]) 3/28/2018    Wound of right leg      Past Surgical History:    No past surgical history on file. Allergies:    Patient has no known allergies. Social History:   Social History     Socioeconomic History    Marital status:       Spouse

## 2023-07-16 ASSESSMENT — ENCOUNTER SYMPTOMS
COUGH: 0
SHORTNESS OF BREATH: 0
CONSTIPATION: 0
DIARRHEA: 0

## 2023-07-31 DIAGNOSIS — Z76.0 MEDICATION REFILL: ICD-10-CM

## 2023-08-01 DIAGNOSIS — F51.01 PRIMARY INSOMNIA: ICD-10-CM

## 2023-08-01 DIAGNOSIS — F41.1 GENERALIZED ANXIETY DISORDER: ICD-10-CM

## 2023-08-01 RX ORDER — ALPRAZOLAM 0.5 MG/1
0.5 TABLET ORAL NIGHTLY PRN
Qty: 30 TABLET | Refills: 0 | OUTPATIENT
Start: 2023-08-01 | End: 2023-09-30

## 2023-08-02 DIAGNOSIS — F51.01 PRIMARY INSOMNIA: ICD-10-CM

## 2023-08-02 DIAGNOSIS — F41.1 GENERALIZED ANXIETY DISORDER: ICD-10-CM

## 2023-08-02 RX ORDER — ALPRAZOLAM 0.5 MG/1
0.5 TABLET ORAL NIGHTLY PRN
Qty: 30 TABLET | Refills: 0 | OUTPATIENT
Start: 2023-08-02 | End: 2023-10-01

## 2023-08-02 RX ORDER — ALPRAZOLAM 0.5 MG/1
0.5 TABLET ORAL NIGHTLY PRN
Qty: 30 TABLET | Refills: 0 | Status: SHIPPED | OUTPATIENT
Start: 2023-08-02 | End: 2023-10-01

## 2023-08-02 NOTE — TELEPHONE ENCOUNTER
Pt calling about refill for the second time today, is very concerned because she has no medication left.     Last Appointment   7/11/2023  Next Appointment  9/26/2023

## 2023-08-14 DIAGNOSIS — R60.0 PEDAL EDEMA: Primary | ICD-10-CM

## 2023-08-14 DIAGNOSIS — R60.0 BILATERAL LOWER EXTREMITY EDEMA: ICD-10-CM

## 2023-08-14 RX ORDER — FUROSEMIDE 20 MG/1
TABLET ORAL
Qty: 45 TABLET | Refills: 1 | Status: SHIPPED | OUTPATIENT
Start: 2023-08-14

## 2023-08-14 NOTE — TELEPHONE ENCOUNTER
Refill request:    Furosemide 20mg    [facility] @ [address]    Last Appointment   7/11/2023  Next Appointment  9/5/2023

## 2023-08-28 DIAGNOSIS — Z76.0 MEDICATION REFILL: ICD-10-CM

## 2023-08-28 NOTE — TELEPHONE ENCOUNTER
Refill needed on:    Alprazolam 0.5 mg at [facility] on [address]    Next visit 9/5/23

## 2023-08-30 RX ORDER — ALPRAZOLAM 0.5 MG/1
0.5 TABLET ORAL NIGHTLY PRN
Qty: 30 TABLET | Refills: 0 | Status: SHIPPED | OUTPATIENT
Start: 2023-08-30 | End: 2023-10-29

## 2023-08-30 NOTE — TELEPHONE ENCOUNTER
Refilled patient's alprazolam prescription for 30 days. Patient will be seen in the office on 9/5/2023. At this time, we need to do a repeat urine drug screen, she has not had one since 2019. The patient's PDMP was reviewed and is appropriate. Discussed case with attending physician, Dr. Viviana Martin. It is important that this patient be counseled on the risks of being on alprazolam at her age, especially given her history of falls this year and subsequent emergency department evaluations. Will discuss with physician seeing her at that visit.     Demetrius Foster DO  Family Medicine Resident, PGY-2  [address] [place] [address]  8/30/2023 7:48 AM

## 2023-09-05 ENCOUNTER — CARE COORDINATION (OUTPATIENT)
Dept: CARE COORDINATION | Age: 88
End: 2023-09-05

## 2023-09-05 ENCOUNTER — OFFICE VISIT (OUTPATIENT)
Dept: FAMILY MEDICINE CLINIC | Age: 88
End: 2023-09-05
Payer: MEDICARE

## 2023-09-05 VITALS
RESPIRATION RATE: 16 BRPM | BODY MASS INDEX: 24.14 KG/M2 | TEMPERATURE: 97.4 F | SYSTOLIC BLOOD PRESSURE: 108 MMHG | HEIGHT: 58 IN | DIASTOLIC BLOOD PRESSURE: 60 MMHG | HEART RATE: 68 BPM | WEIGHT: 115 LBS | OXYGEN SATURATION: 96 %

## 2023-09-05 DIAGNOSIS — Z00.00 MEDICARE ANNUAL WELLNESS VISIT, SUBSEQUENT: Primary | ICD-10-CM

## 2023-09-05 DIAGNOSIS — Z00.00 MEDICARE ANNUAL WELLNESS VISIT, SUBSEQUENT: ICD-10-CM

## 2023-09-05 DIAGNOSIS — F41.9 ANXIETY: ICD-10-CM

## 2023-09-05 PROCEDURE — 1123F ACP DISCUSS/DSCN MKR DOCD: CPT

## 2023-09-05 PROCEDURE — G0439 PPPS, SUBSEQ VISIT: HCPCS

## 2023-09-05 SDOH — ECONOMIC STABILITY: FOOD INSECURITY: WITHIN THE PAST 12 MONTHS, THE FOOD YOU BOUGHT JUST DIDN'T LAST AND YOU DIDN'T HAVE MONEY TO GET MORE.: NEVER TRUE

## 2023-09-05 SDOH — ECONOMIC STABILITY: FOOD INSECURITY: WITHIN THE PAST 12 MONTHS, YOU WORRIED THAT YOUR FOOD WOULD RUN OUT BEFORE YOU GOT MONEY TO BUY MORE.: NEVER TRUE

## 2023-09-05 SDOH — ECONOMIC STABILITY: INCOME INSECURITY: HOW HARD IS IT FOR YOU TO PAY FOR THE VERY BASICS LIKE FOOD, HOUSING, MEDICAL CARE, AND HEATING?: NOT HARD AT ALL

## 2023-09-05 ASSESSMENT — PATIENT HEALTH QUESTIONNAIRE - PHQ9
SUM OF ALL RESPONSES TO PHQ QUESTIONS 1-9: 0
SUM OF ALL RESPONSES TO PHQ9 QUESTIONS 1 & 2: 0
SUM OF ALL RESPONSES TO PHQ QUESTIONS 1-9: 0
SUM OF ALL RESPONSES TO PHQ QUESTIONS 1-9: 0
2. FEELING DOWN, DEPRESSED OR HOPELESS: 0
1. LITTLE INTEREST OR PLEASURE IN DOING THINGS: 0
SUM OF ALL RESPONSES TO PHQ QUESTIONS 1-9: 0

## 2023-09-05 NOTE — CARE COORDINATION
Initial Contact Social Work Note - Ambulatory  9/5/2023      Date of referral: 9/5/2023  Referral received from: PCP  Reason for referral: resources home health    Previous SW referral: No  If yes, brief summary of outcome: N/A    Two Identifiers Verified: Yes    Insurance Provider: Alysa Roe Medicare/[employer]    Support System: Adult Child/Children, grand children, friends     Status: No    Community Providers:  N/A    ADL Assistance Needed: N/A    Housing/Living Concerns or Home Modification Needs: Daughter completes chores/laundry     Transportation Concern: Yes, pt needs someone to go with her to her appointments    Medication Cost Concern: No    Medication Adherence Concern: Daughter manages pt's medications    Financial Concern(s): No    Income (only if applicable): SS    Ability to Read/Write: Yes    Advance Care Plan:  N/A    Other: N/A    Identified Needs:  Home health aide to assist pt in and out of her appointments and be with her at her appointments      Social Work Plan:  [facility] reviewed private duty caregiver agencies and will be mailing out list  Next Steps: [facility] to f/u    Method of Communication With Provider (if appropriate): Chart Routing       Goals Addressed    None      [facility] made call to pt's primary phone number, pt's dtr Martina answered, introduced self and asked for pt. Pt provided verbal permission on the phone to take with her daughter Tab Mejia for assessment. [facility] completed assessment with josephine Mack on phone. Tab Mejia reports they are looking for a resource to assist pt to and from appointments and to stay with pt at appointment. Tab Mejia has called [facility] and they have came out for assessment visit, but pt does not qualify for services. Martina/pt are aware of [employer] annalisab to annalisab service, pt's insurance transportation benefit and [facility] , although they are looking for aide to stay with pt during appointments.  [facility] reviewed private duty caregiver aide as a resource and offered to [employer] and [employer] of agencies,

## 2023-09-06 LAB
AMPHETAMINE SCREEN URINE: NEGATIVE
BARBITURATE SCREEN URINE: NEGATIVE
BENZODIAZEPINE SCREEN, URINE: NEGATIVE
BUPRENORPHINE URINE: NEGATIVE
CANNABINOID SCREEN URINE: NEGATIVE
COCAINE METABOLITE, URINE: NEGATIVE
FENTANYL URINE: NEGATIVE
METHADONE SCREEN, URINE: NEGATIVE
OPIATES, URINE: NEGATIVE
OXYCODONE SCREEN URINE: NEGATIVE
PHENCYCLIDINE, URINE: NEGATIVE
TEST INFORMATION: NORMAL

## 2023-09-12 ENCOUNTER — CARE COORDINATION (OUTPATIENT)
Dept: CARE COORDINATION | Age: 88
End: 2023-09-12

## 2023-09-12 NOTE — CARE COORDINATION
[facility] made f/u call to pt's dtr toshia to see if she has received list of private duty caregiver agencies, unable to reach, left message requesting call back.     Pelon SKINNER, [place]   Care Coordinator  [phone]

## 2023-09-18 ENCOUNTER — CARE COORDINATION (OUTPATIENT)
Dept: CARE COORDINATION | Age: 88
End: 2023-09-18

## 2023-09-18 NOTE — CARE COORDINATION
[facility] made f/u call to pt's daughter Radha Enamorado, unable to reach, left message requesting call back. Received call back from pt's dtr Martina reporting she did not receive list of private duty caregiver agencies in the mail. Confirmed mailing address and this [facility] will request for list to be mailed out again, [facility] to f/u.     Anderson SKINNER, [place]   Care Coordinator  [phone]

## 2023-09-22 DIAGNOSIS — E78.2 MIXED HYPERLIPIDEMIA: ICD-10-CM

## 2023-09-22 DIAGNOSIS — Z76.0 MEDICATION REFILL: ICD-10-CM

## 2023-09-22 RX ORDER — ROSUVASTATIN CALCIUM 10 MG/1
10 TABLET, COATED ORAL DAILY
Qty: 90 TABLET | Refills: 1 | Status: SHIPPED | OUTPATIENT
Start: 2023-09-22

## 2023-09-22 RX ORDER — ALPRAZOLAM 0.5 MG/1
0.5 TABLET ORAL NIGHTLY PRN
Qty: 30 TABLET | Refills: 0 | Status: CANCELLED | OUTPATIENT
Start: 2023-09-22 | End: 2023-11-21

## 2023-09-22 NOTE — TELEPHONE ENCOUNTER
Last Appointment   9/5/2023  Next Appointment  12/5/2023  Patient needs refills of Rosuvastatin 10 mg, Alprazolam 0.5 mg ( not due until next week) if you can still call in, she is out of the Rosuvastatin

## 2023-09-22 NOTE — TELEPHONE ENCOUNTER
Refilled rosuvastatin. Patient not appropriate for Xanax refill at this time.      Naresh Cohen DO  Family Medicine Resident, PGY-2  [facility]  9/22/2023 1:07 PM

## 2023-09-26 ENCOUNTER — OFFICE VISIT (OUTPATIENT)
Dept: FAMILY MEDICINE CLINIC | Age: 88
End: 2023-09-26
Payer: MEDICARE

## 2023-09-26 ENCOUNTER — CARE COORDINATION (OUTPATIENT)
Dept: CARE COORDINATION | Age: 88
End: 2023-09-26

## 2023-09-26 VITALS
DIASTOLIC BLOOD PRESSURE: 58 MMHG | SYSTOLIC BLOOD PRESSURE: 104 MMHG | BODY MASS INDEX: 23.93 KG/M2 | HEART RATE: 59 BPM | HEIGHT: 58 IN | WEIGHT: 114 LBS | RESPIRATION RATE: 16 BRPM | TEMPERATURE: 97.3 F | OXYGEN SATURATION: 96 %

## 2023-09-26 DIAGNOSIS — Z79.899 CHRONIC PRESCRIPTION BENZODIAZEPINE USE: Primary | ICD-10-CM

## 2023-09-26 PROCEDURE — 99213 OFFICE O/P EST LOW 20 MIN: CPT

## 2023-09-26 PROCEDURE — 1123F ACP DISCUSS/DSCN MKR DOCD: CPT

## 2023-09-26 NOTE — PROGRESS NOTES
Sari Corral  Department of Family Medicine  Family Medicine Residency Program      Patient: Srinivas Bliss 81 y.o. female   Chief complaint:   Chief Complaint   Patient presents with    Medication Refill       HISTORY OF PRESENTING ILLNESS     81 y.o. female PMH PMH CKD 3a, AS, COPD, HTN, HLD, insomnia, BASILIA, extremity wounds presented to the clinic for discussion of refills. Patient brought to office for concerns of benzodiazepine prescription use. Hx UDS negative at appointment earlier this month. Patient insists she takes medication every single day at night, denies that anyone else is taking her prescription or that someone has access to it when she is not around, or that someone has pressured her to give them her medication; takes before going to bed, patient was counseled on the increase in risk in all cause mortality of benzodiazepines in her age group. She is insistent that she wants to continue therapy regardless, not getting prescription from anywhere else, denies running out of pills prior to 30 days, denies recent falls; patient uses for sleep      Health Maintenance:  Health Maintenance Due   Topic Date Due    Shingles vaccine (1 of 2) Never done    COVID-19 Vaccine (4 - Pfizer series) 11/25/2021    Flu vaccine (1) 08/01/2023     Past Medical History:      Diagnosis Date    Aortic stenosis     COPD (chronic obstructive pulmonary disease) (HCC)     Disorder of bone     Elevated hemoglobin A1c     Fatigue     Hyperlipidemia     Hypertension     Open wound of right forearm 2/8/2023    Skin only    Smoker     Traumatic ulcer of lower leg, right, with fat layer exposed ([address]) 3/28/2018    Wound of right leg      Past Surgical History:    No past surgical history on file. Allergies:    Patient has no known allergies. Social History:   Social History     Socioeconomic History    Marital status:       Spouse name: Not on file    Number of children: Not on file    Years of

## 2023-09-26 NOTE — CARE COORDINATION
[facility] made f/u call to pt's daughter Livier Downey, she reports she has received list of private duty caregiver agencies and is reviewing it. No other questions or needs reported. Livier Downey has this [facility] contact info to call with any  needs that may arise. CC to sign off.     Melvin Mathews MSW, [address]   Care Coordinator  [phone]

## 2023-09-27 ENCOUNTER — NURSE ONLY (OUTPATIENT)
Dept: FAMILY MEDICINE CLINIC | Age: 88
End: 2023-09-27

## 2023-09-27 DIAGNOSIS — Z79.899 CHRONIC PRESCRIPTION BENZODIAZEPINE USE: Primary | ICD-10-CM

## 2023-09-27 DIAGNOSIS — Z79.899 CHRONIC PRESCRIPTION BENZODIAZEPINE USE: ICD-10-CM

## 2023-09-27 LAB
AMPHETAMINE SCREEN URINE: NEGATIVE
BARBITURATE SCREEN URINE: NEGATIVE
BENZODIAZEPINE SCREEN, URINE: POSITIVE
BUPRENORPHINE URINE: NEGATIVE
CANNABINOID SCREEN URINE: NEGATIVE
COCAINE METABOLITE, URINE: NEGATIVE
FENTANYL URINE: NEGATIVE
METHADONE SCREEN, URINE: NEGATIVE
OPIATES, URINE: NEGATIVE
OXYCODONE SCREEN URINE: NEGATIVE
PHENCYCLIDINE, URINE: NEGATIVE
TEST INFORMATION: ABNORMAL

## 2023-09-27 ASSESSMENT — ENCOUNTER SYMPTOMS
VOMITING: 0
ABDOMINAL PAIN: 0
CONSTIPATION: 0
NAUSEA: 0
DIARRHEA: 0
SHORTNESS OF BREATH: 0

## 2023-09-28 ENCOUNTER — TELEPHONE (OUTPATIENT)
Dept: FAMILY MEDICINE CLINIC | Age: 88
End: 2023-09-28

## 2023-09-28 DIAGNOSIS — Z76.0 MEDICATION REFILL: ICD-10-CM

## 2023-09-28 RX ORDER — ALPRAZOLAM 0.5 MG/1
0.5 TABLET ORAL NIGHTLY PRN
Qty: 30 TABLET | Refills: 0 | Status: SHIPPED | OUTPATIENT
Start: 2023-09-28 | End: 2023-11-27

## 2023-09-28 NOTE — TELEPHONE ENCOUNTER
Last Appointment   9/26/2023  Next Appointment  12/5/2023  Patient is calling in for results of urine and to check on script refill

## 2023-09-28 NOTE — TELEPHONE ENCOUNTER
Patient's UDS reviewed. It is positive and appropriate for benzodiazepines. Reviewed PDMP, appropriate. Refill for 30 days of Xanax sent to pharmacy.     Lia Millard DO  Family Medicine Resident, PGY-2  [address] [place] [address]  9/28/2023 4:14 PM

## 2023-10-06 ENCOUNTER — OFFICE VISIT (OUTPATIENT)
Dept: FAMILY MEDICINE CLINIC | Age: 88
End: 2023-10-06
Payer: COMMERCIAL

## 2023-10-06 VITALS
WEIGHT: 114 LBS | SYSTOLIC BLOOD PRESSURE: 120 MMHG | BODY MASS INDEX: 22.38 KG/M2 | HEART RATE: 50 BPM | DIASTOLIC BLOOD PRESSURE: 60 MMHG | TEMPERATURE: 97.4 F | OXYGEN SATURATION: 93 % | HEIGHT: 60 IN | RESPIRATION RATE: 16 BRPM

## 2023-10-06 DIAGNOSIS — S81.812A SKIN TEAR OF LEFT LOWER LEG WITHOUT COMPLICATION, INITIAL ENCOUNTER: Primary | ICD-10-CM

## 2023-10-06 DIAGNOSIS — Z23 NEED FOR IMMUNIZATION AGAINST INFLUENZA: ICD-10-CM

## 2023-10-06 PROCEDURE — G0008 ADMIN INFLUENZA VIRUS VAC: HCPCS | Performed by: FAMILY MEDICINE

## 2023-10-06 PROCEDURE — 1123F ACP DISCUSS/DSCN MKR DOCD: CPT

## 2023-10-06 PROCEDURE — 99213 OFFICE O/P EST LOW 20 MIN: CPT

## 2023-10-06 PROCEDURE — 90694 VACC AIIV4 NO PRSRV 0.5ML IM: CPT | Performed by: FAMILY MEDICINE

## 2023-10-06 NOTE — PROGRESS NOTES
Sari Corral  Department of [address] [place] [address] Residency Program    Taras (:  1935) is a 81 y.o. female,Established patient, here for evaluation of the following chief complaint(s):  Knee Pain (L knee, bumped it a few days ago)      ASSESSMENT/PLAN:  Taras presents today for knee pain and has minor skin tear over left knee. No signs of underlying infection at this time. Counseled on supportive care measures including washing with lukewarm water and antibacterial soap, using triple antibiotic ointment, and using gauze with Ace bandage wrap instead of adhesive bandage to prevent further skin tearing. We will administer flu vaccine today. 1. Skin tear of left lower leg without complication, initial encounter    2. Need for immunization against influenza  - Influenza, FLUAD, (age 72 y+), IM, Preservative Free, 0.5 mL       No follow-ups on file. SUBJECTIVE/OBJECTIVE:    L knee pain  Bumped L knee while walking 2 days ago  Not in any pain currently  Able to bear weight and ambulate  Tore the skin with some initial minimal bleeding  Has not noticed any warmth or swelling  Not on any blood thinners    Review of Systems  Pertinent positives as above        Diagnosis Date    Aortic stenosis     COPD (chronic obstructive pulmonary disease) (HCC)     Disorder of bone     Elevated hemoglobin A1c     Fatigue     Hyperlipidemia     Hypertension     Open wound of right forearm 2023    Skin only    Smoker     Traumatic ulcer of lower leg, right, with fat layer exposed ([address]) 3/28/2018    Wound of right leg        No past surgical history on file. Patient has no known allergies. Social History     Socioeconomic History    Marital status:       Spouse name: Not on file    Number of children: Not on file    Years of education: Not on file    Highest education level: Not on file   Occupational History    Not on file   Tobacco Use    Smoking status:

## 2023-10-16 ENCOUNTER — OFFICE VISIT (OUTPATIENT)
Dept: FAMILY MEDICINE CLINIC | Age: 88
End: 2023-10-16
Payer: COMMERCIAL

## 2023-10-16 VITALS
RESPIRATION RATE: 17 BRPM | BODY MASS INDEX: 24.59 KG/M2 | TEMPERATURE: 97.5 F | OXYGEN SATURATION: 97 % | DIASTOLIC BLOOD PRESSURE: 68 MMHG | HEIGHT: 57 IN | WEIGHT: 114 LBS | SYSTOLIC BLOOD PRESSURE: 132 MMHG | HEART RATE: 72 BPM

## 2023-10-16 DIAGNOSIS — T14.8XXA BRUISING: ICD-10-CM

## 2023-10-16 DIAGNOSIS — E78.2 MIXED HYPERLIPIDEMIA: ICD-10-CM

## 2023-10-16 DIAGNOSIS — L03.90 WOUND CELLULITIS: Primary | ICD-10-CM

## 2023-10-16 PROCEDURE — 36415 COLL VENOUS BLD VENIPUNCTURE: CPT | Performed by: FAMILY MEDICINE

## 2023-10-16 PROCEDURE — 99214 OFFICE O/P EST MOD 30 MIN: CPT | Performed by: FAMILY MEDICINE

## 2023-10-16 PROCEDURE — 1123F ACP DISCUSS/DSCN MKR DOCD: CPT | Performed by: FAMILY MEDICINE

## 2023-10-16 RX ORDER — DOXYCYCLINE HYCLATE 100 MG
100 TABLET ORAL 2 TIMES DAILY
Qty: 20 TABLET | Refills: 0 | Status: ON HOLD
Start: 2023-10-16 | End: 2023-10-18

## 2023-10-16 RX ORDER — ROSUVASTATIN CALCIUM 5 MG/1
5 TABLET, COATED ORAL DAILY
Qty: 30 TABLET | Refills: 0 | Status: ON HOLD
Start: 2023-10-16 | End: 2023-10-20 | Stop reason: HOSPADM

## 2023-10-16 NOTE — PATIENT INSTRUCTIONS
Take 100 mg Doxycyline twice a day for 10 days. Stop Rosuvastatin (Crestor) 10 mg. Take 5 mg daily. New prescription. Repeat lipid panel in 1 month.

## 2023-10-18 ENCOUNTER — HOSPITAL ENCOUNTER (INPATIENT)
Age: 88
LOS: 2 days | Discharge: HOME | End: 2023-10-20
Attending: FAMILY MEDICINE | Admitting: FAMILY MEDICINE
Payer: COMMERCIAL

## 2023-10-18 ENCOUNTER — OFFICE VISIT (OUTPATIENT)
Dept: FAMILY MEDICINE CLINIC | Age: 88
End: 2023-10-18
Payer: COMMERCIAL

## 2023-10-18 VITALS
HEART RATE: 61 BPM | HEIGHT: 57 IN | OXYGEN SATURATION: 97 % | RESPIRATION RATE: 17 BRPM | WEIGHT: 114 LBS | TEMPERATURE: 97.6 F | SYSTOLIC BLOOD PRESSURE: 126 MMHG | DIASTOLIC BLOOD PRESSURE: 77 MMHG | BODY MASS INDEX: 24.59 KG/M2

## 2023-10-18 DIAGNOSIS — L03.90 WOUND CELLULITIS: Primary | ICD-10-CM

## 2023-10-18 PROBLEM — L03.116 CELLULITIS OF LEFT FOOT: Status: ACTIVE | Noted: 2023-10-18

## 2023-10-18 LAB
ANION GAP SERPL CALCULATED.3IONS-SCNC: 10 MMOL/L (ref 7–16)
BASOPHILS # BLD: 0.06 K/UL (ref 0–0.2)
BASOPHILS NFR BLD: 1 % (ref 0–2)
BUN SERPL-MCNC: 38 MG/DL (ref 6–23)
CALCIUM SERPL-MCNC: 8.7 MG/DL (ref 8.6–10.2)
CHLORIDE SERPL-SCNC: 106 MMOL/L (ref 98–107)
CO2 SERPL-SCNC: 25 MMOL/L (ref 22–29)
CREAT SERPL-MCNC: 1.5 MG/DL (ref 0.5–1)
CRP SERPL HS-MCNC: <3 MG/L (ref 0–5)
EOSINOPHIL # BLD: 0.18 K/UL (ref 0.05–0.5)
EOSINOPHILS RELATIVE PERCENT: 2 % (ref 0–6)
ERYTHROCYTE [DISTWIDTH] IN BLOOD BY AUTOMATED COUNT: 12.6 % (ref 11.5–15)
ERYTHROCYTE [SEDIMENTATION RATE] IN BLOOD BY WESTERGREN METHOD: 20 MM/HR (ref 0–20)
GFR SERPL CREATININE-BSD FRML MDRD: 34 ML/MIN/1.73M2
GLUCOSE SERPL-MCNC: 91 MG/DL (ref 74–99)
HCT VFR BLD AUTO: 35.5 % (ref 34–48)
HGB BLD-MCNC: 11.5 G/DL (ref 11.5–15.5)
IMM GRANULOCYTES # BLD AUTO: <0.03 K/UL (ref 0–0.58)
IMM GRANULOCYTES NFR BLD: 0 % (ref 0–5)
LYMPHOCYTES NFR BLD: 2.5 K/UL (ref 1.5–4)
LYMPHOCYTES RELATIVE PERCENT: 33 % (ref 20–42)
MCH RBC QN AUTO: 32.5 PG (ref 26–35)
MCHC RBC AUTO-ENTMCNC: 32.4 G/DL (ref 32–34.5)
MCV RBC AUTO: 100.3 FL (ref 80–99.9)
MONOCYTES NFR BLD: 0.7 K/UL (ref 0.1–0.95)
MONOCYTES NFR BLD: 9 % (ref 2–12)
NEUTROPHILS NFR BLD: 55 % (ref 43–80)
NEUTS SEG NFR BLD: 4.21 K/UL (ref 1.8–7.3)
PLATELET, FLUORESCENCE: 196 K/UL (ref 130–450)
PMV BLD AUTO: 13.3 FL (ref 7–12)
POTASSIUM SERPL-SCNC: 4 MMOL/L (ref 3.5–5)
RBC # BLD AUTO: 3.54 M/UL (ref 3.5–5.5)
SODIUM SERPL-SCNC: 141 MMOL/L (ref 132–146)
WBC OTHER # BLD: 7.7 K/UL (ref 4.5–11.5)

## 2023-10-18 PROCEDURE — 2580000003 HC RX 258

## 2023-10-18 PROCEDURE — 86140 C-REACTIVE PROTEIN: CPT

## 2023-10-18 PROCEDURE — 6360000002 HC RX W HCPCS

## 2023-10-18 PROCEDURE — 87081 CULTURE SCREEN ONLY: CPT

## 2023-10-18 PROCEDURE — 2060000000 HC ICU INTERMEDIATE R&B

## 2023-10-18 PROCEDURE — 85652 RBC SED RATE AUTOMATED: CPT

## 2023-10-18 PROCEDURE — 87040 BLOOD CULTURE FOR BACTERIA: CPT

## 2023-10-18 PROCEDURE — 1123F ACP DISCUSS/DSCN MKR DOCD: CPT

## 2023-10-18 PROCEDURE — 80048 BASIC METABOLIC PNL TOTAL CA: CPT

## 2023-10-18 PROCEDURE — 36415 COLL VENOUS BLD VENIPUNCTURE: CPT

## 2023-10-18 PROCEDURE — 85025 COMPLETE CBC W/AUTO DIFF WBC: CPT

## 2023-10-18 PROCEDURE — 99215 OFFICE O/P EST HI 40 MIN: CPT

## 2023-10-18 RX ORDER — SODIUM CHLORIDE 0.9 % (FLUSH) 0.9 %
5-40 SYRINGE (ML) INJECTION EVERY 12 HOURS SCHEDULED
Status: DISCONTINUED | OUTPATIENT
Start: 2023-10-18 | End: 2023-10-20 | Stop reason: HOSPADM

## 2023-10-18 RX ORDER — FUROSEMIDE 20 MG/1
20 TABLET ORAL DAILY
Status: DISCONTINUED | OUTPATIENT
Start: 2023-10-19 | End: 2023-10-20 | Stop reason: HOSPADM

## 2023-10-18 RX ORDER — ENOXAPARIN SODIUM 100 MG/ML
30 INJECTION SUBCUTANEOUS DAILY
Status: DISCONTINUED | OUTPATIENT
Start: 2023-10-18 | End: 2023-10-20 | Stop reason: HOSPADM

## 2023-10-18 RX ORDER — ALPRAZOLAM 0.25 MG/1
0.5 TABLET ORAL NIGHTLY PRN
Status: DISCONTINUED | OUTPATIENT
Start: 2023-10-18 | End: 2023-10-20 | Stop reason: HOSPADM

## 2023-10-18 RX ORDER — ACETAMINOPHEN 325 MG/1
650 TABLET ORAL EVERY 6 HOURS PRN
Status: DISCONTINUED | OUTPATIENT
Start: 2023-10-18 | End: 2023-10-20 | Stop reason: HOSPADM

## 2023-10-18 RX ORDER — SODIUM CHLORIDE 0.9 % (FLUSH) 0.9 %
5-40 SYRINGE (ML) INJECTION PRN
Status: DISCONTINUED | OUTPATIENT
Start: 2023-10-18 | End: 2023-10-20 | Stop reason: HOSPADM

## 2023-10-18 RX ORDER — ONDANSETRON 2 MG/ML
4 INJECTION INTRAMUSCULAR; INTRAVENOUS EVERY 6 HOURS PRN
Status: DISCONTINUED | OUTPATIENT
Start: 2023-10-18 | End: 2023-10-20 | Stop reason: HOSPADM

## 2023-10-18 RX ORDER — ACETAMINOPHEN 650 MG/1
650 SUPPOSITORY RECTAL EVERY 6 HOURS PRN
Status: DISCONTINUED | OUTPATIENT
Start: 2023-10-18 | End: 2023-10-20 | Stop reason: HOSPADM

## 2023-10-18 RX ORDER — SODIUM CHLORIDE 9 MG/ML
INJECTION, SOLUTION INTRAVENOUS PRN
Status: DISCONTINUED | OUTPATIENT
Start: 2023-10-18 | End: 2023-10-20 | Stop reason: HOSPADM

## 2023-10-18 RX ORDER — ONDANSETRON 4 MG/1
4 TABLET, ORALLY DISINTEGRATING ORAL EVERY 8 HOURS PRN
Status: DISCONTINUED | OUTPATIENT
Start: 2023-10-18 | End: 2023-10-20 | Stop reason: HOSPADM

## 2023-10-18 RX ORDER — POLYETHYLENE GLYCOL 3350 17 G/17G
17 POWDER, FOR SOLUTION ORAL DAILY PRN
Status: DISCONTINUED | OUTPATIENT
Start: 2023-10-18 | End: 2023-10-20 | Stop reason: HOSPADM

## 2023-10-18 RX ORDER — ROSUVASTATIN CALCIUM 5 MG/1
5 TABLET, COATED ORAL DAILY
Status: DISCONTINUED | OUTPATIENT
Start: 2023-10-19 | End: 2023-10-19

## 2023-10-18 RX ADMIN — SODIUM CHLORIDE, PRESERVATIVE FREE 10 ML: 5 INJECTION INTRAVENOUS at 21:29

## 2023-10-18 RX ADMIN — PIPERACILLIN AND TAZOBACTAM 3375 MG: 3; .375 INJECTION, POWDER, LYOPHILIZED, FOR SOLUTION INTRAVENOUS at 18:11

## 2023-10-18 RX ADMIN — ENOXAPARIN SODIUM 30 MG: 100 INJECTION SUBCUTANEOUS at 18:05

## 2023-10-18 ASSESSMENT — ENCOUNTER SYMPTOMS
ABDOMINAL PAIN: 0
DIARRHEA: 0
COUGH: 0
SORE THROAT: 0
TROUBLE SWALLOWING: 0
SHORTNESS OF BREATH: 0
VOMITING: 0
NAUSEA: 0
COLOR CHANGE: 1
CONSTIPATION: 0

## 2023-10-18 NOTE — BSMART NOTE
4 Eyes Skin Assessment     NAME:  Mehreen Rueda  YOB: 1935  MEDICAL RECORD NUMBER:  [id number]    The patient is being assessed for  Admission    I agree that at least one RN has performed a thorough Head to Toe Skin Assessment on the patient. ALL assessment sites listed below have been assessed. Areas assessed by both nurses:    Head, Face, Ears, Shoulders, Back, Chest, Arms, Elbows, Hands, Sacrum. Buttock, Coccyx, Ischium, and Legs. Feet and Heels        Does the Patient have a Wound? Yes wound(s) were present on assessment.  LDA wound assessment was Initiated and completed by RN       Danny Prevention initiated by RN: No  Wound Care Orders initiated by RN: No    Pressure Injury (Stage 3,4, Unstageable, DTI, NWPT, and Complex wounds) if present, place Wound referral order by RN under : No    New Ostomies, if present place, Ostomy referral order under : No     Nurse 1 eSignature: Electronically signed by Annia Carvajal RN on 10/18/23 at 6:46 PM EDT    **SHARE this note so that the co-signing nurse can place an eSignature**    Nurse 2 eSignature: {Esignature:[id number]}

## 2023-10-18 NOTE — BSMART NOTE
4 Eyes Skin Assessment     NAME:  Nataly Miller  YOB: 1935  MEDICAL RECORD NUMBER:  [id number]    The patient is being assessed for  Admission    I agree that at least one RN has performed a thorough Head to Toe Skin Assessment on the patient. ALL assessment sites listed below have been assessed. Areas assessed by both nurses:    Head, Face, Ears, Shoulders, Back, Chest, Arms, Elbows, Hands, Sacrum. Buttock, Coccyx, Ischium, and Legs. Feet and Heels        Does the Patient have a Wound? Yes wound(s) were present on assessment.  LDA wound assessment was Initiated and completed by RN       Danny Prevention initiated by RN: No  Wound Care Orders initiated by RN: No    Pressure Injury (Stage 3,4, Unstageable, DTI, NWPT, and Complex wounds) if present, place Wound referral order by RN under : No    New Ostomies, if present place, Ostomy referral order under : No     Nurse 1 eSignature: Electronically signed by Nicky Gramajo RN on 10/18/23 at 6:46 PM EDT    **SHARE this note so that the co-signing nurse can place an eSignature**    Nurse 2 eSignature: Electronically signed by Angelica Oneill RN on 10/18/23 at 6:47 PM EDT

## 2023-10-18 NOTE — H&P
[facility]  Resident History and Physical      Chief Complaint:  LLE cellulitis     History of Present Illness:   Felicia Alonzo  is a 81 y.o. female patient of [facility]   with a pertinent PMHx of insomnia, BASILIA, CKD 3a, leg swelling, HLD who presented as a direct admit with chief complaint of LLE cellulitis diagnosed in clinic. Patient initially seen in clinic 10/6 for skin tear at left knee with instructions for conservative management with antibiotic ointment. Patient then seen 10/16 and found to have extensive cellulitis of left lower extremity from knee to ankle with weeping fluid at knee. Patient was prescribed doxycycline for cellulitis. Patient took total 4 doses of medication and presented to clinic today for reevaluation. Marking drawn on affected region at previous visit and today shows no change. Weeping area has decreased in size and patient does report more pain in extremity with movement, but no pain to palpation. Patient denies any fever, chills, sweats, abdominal pain, N/V, loss of appetite changes in urination/BM. Hx of extremity wounds followed in wound care. Smokes 1/2 PPD. Labs drawn in clinic on 10/16 including CBC, CMP, B12, folate, TSH, CRP, lipid panel largely unremarkable. There is no leukocytosis or elevation in CRP. Creatinine 1.2 at baseline. Alkaline phosphatase elevated 131, ALT 70, AST 80. Patient has had previous elevations in ALT and AST. CODE STATUS discussed: DNR. Past Medical History:   Diagnosis Date    Aortic stenosis     COPD (chronic obstructive pulmonary disease) (HCC)     Disorder of bone     Elevated hemoglobin A1c     Fatigue     Hyperlipidemia     Hypertension     Open wound of right forearm 2/8/2023    Skin only    Smoker     Traumatic ulcer of lower leg, right, with fat layer exposed ([address]) 3/28/2018    Wound of right leg          No past surgical history on file.     Medications Prior to Admission:

## 2023-10-19 ENCOUNTER — APPOINTMENT (OUTPATIENT)
Dept: ULTRASOUND IMAGING | Age: 88
End: 2023-10-19
Attending: FAMILY MEDICINE
Payer: COMMERCIAL

## 2023-10-19 LAB
ANION GAP SERPL CALCULATED.3IONS-SCNC: 11 MMOL/L (ref 7–16)
BASOPHILS # BLD: 0.05 K/UL (ref 0–0.2)
BASOPHILS NFR BLD: 1 % (ref 0–2)
BUN SERPL-MCNC: 35 MG/DL (ref 6–23)
CALCIUM SERPL-MCNC: 8.6 MG/DL (ref 8.6–10.2)
CHLORIDE SERPL-SCNC: 110 MMOL/L (ref 98–107)
CO2 SERPL-SCNC: 24 MMOL/L (ref 22–29)
CREAT SERPL-MCNC: 1.3 MG/DL (ref 0.5–1)
EOSINOPHIL # BLD: 0.01 K/UL (ref 0.05–0.5)
EOSINOPHILS RELATIVE PERCENT: 0 % (ref 0–6)
ERYTHROCYTE [DISTWIDTH] IN BLOOD BY AUTOMATED COUNT: 12.7 % (ref 11.5–15)
GFR SERPL CREATININE-BSD FRML MDRD: 39 ML/MIN/1.73M2
GLUCOSE SERPL-MCNC: 96 MG/DL (ref 74–99)
HCT VFR BLD AUTO: 33.5 % (ref 34–48)
HGB BLD-MCNC: 11 G/DL (ref 11.5–15.5)
IMM GRANULOCYTES # BLD AUTO: <0.03 K/UL (ref 0–0.58)
IMM GRANULOCYTES NFR BLD: 0 % (ref 0–5)
LYMPHOCYTES NFR BLD: 2.38 K/UL (ref 1.5–4)
LYMPHOCYTES RELATIVE PERCENT: 35 % (ref 20–42)
MCH RBC QN AUTO: 32.8 PG (ref 26–35)
MCHC RBC AUTO-ENTMCNC: 32.8 G/DL (ref 32–34.5)
MCV RBC AUTO: 100 FL (ref 80–99.9)
MONOCYTES NFR BLD: 0.88 K/UL (ref 0.1–0.95)
MONOCYTES NFR BLD: 13 % (ref 2–12)
NEUTROPHILS NFR BLD: 52 % (ref 43–80)
NEUTS SEG NFR BLD: 3.52 K/UL (ref 1.8–7.3)
PLATELET ESTIMATE: ABNORMAL
PLATELET, FLUORESCENCE: 184 K/UL (ref 130–450)
PMV BLD AUTO: 13.6 FL (ref 7–12)
POTASSIUM SERPL-SCNC: 4.1 MMOL/L (ref 3.5–5)
RBC # BLD AUTO: 3.35 M/UL (ref 3.5–5.5)
SODIUM SERPL-SCNC: 145 MMOL/L (ref 132–146)
WBC OTHER # BLD: 6.9 K/UL (ref 4.5–11.5)

## 2023-10-19 PROCEDURE — 99222 1ST HOSP IP/OBS MODERATE 55: CPT | Performed by: FAMILY MEDICINE

## 2023-10-19 PROCEDURE — 6360000002 HC RX W HCPCS

## 2023-10-19 PROCEDURE — 2580000003 HC RX 258

## 2023-10-19 PROCEDURE — 2060000000 HC ICU INTERMEDIATE R&B

## 2023-10-19 PROCEDURE — 93923 UPR/LXTR ART STDY 3+ LVLS: CPT

## 2023-10-19 PROCEDURE — 80048 BASIC METABOLIC PNL TOTAL CA: CPT

## 2023-10-19 PROCEDURE — 36415 COLL VENOUS BLD VENIPUNCTURE: CPT

## 2023-10-19 PROCEDURE — 85025 COMPLETE CBC W/AUTO DIFF WBC: CPT

## 2023-10-19 PROCEDURE — 6370000000 HC RX 637 (ALT 250 FOR IP)

## 2023-10-19 RX ORDER — ROSUVASTATIN CALCIUM 10 MG/1
10 TABLET, COATED ORAL DAILY
Status: DISCONTINUED | OUTPATIENT
Start: 2023-10-20 | End: 2023-10-20 | Stop reason: HOSPADM

## 2023-10-19 RX ADMIN — SODIUM CHLORIDE, PRESERVATIVE FREE 10 ML: 5 INJECTION INTRAVENOUS at 10:03

## 2023-10-19 RX ADMIN — ROSUVASTATIN CALCIUM 5 MG: 5 TABLET, FILM COATED ORAL at 10:03

## 2023-10-19 RX ADMIN — SODIUM CHLORIDE, PRESERVATIVE FREE 10 ML: 5 INJECTION INTRAVENOUS at 13:12

## 2023-10-19 RX ADMIN — WATER 2000 MG: 1 INJECTION INTRAMUSCULAR; INTRAVENOUS; SUBCUTANEOUS at 13:49

## 2023-10-19 RX ADMIN — FUROSEMIDE 20 MG: 20 TABLET ORAL at 10:03

## 2023-10-19 RX ADMIN — SODIUM CHLORIDE, PRESERVATIVE FREE 10 ML: 5 INJECTION INTRAVENOUS at 20:18

## 2023-10-19 RX ADMIN — PIPERACILLIN AND TAZOBACTAM 3375 MG: 3; .375 INJECTION, POWDER, LYOPHILIZED, FOR SOLUTION INTRAVENOUS at 01:15

## 2023-10-19 RX ADMIN — ENOXAPARIN SODIUM 30 MG: 100 INJECTION SUBCUTANEOUS at 10:03

## 2023-10-19 NOTE — ACP (ADVANCE CARE PLANNING)
Advance Care Planning   Healthcare Decision Maker:    Primary Decision Maker: Luis Alfredo Romero Child - [phone]    Secondary Decision Maker: Nic Correa Child - [phone]

## 2023-10-19 NOTE — PLAN OF CARE
Problem: Discharge Planning  Goal: Discharge to home or other facility with appropriate resources  10/19/2023 1106 by Kaitlin Truong RN  Outcome: Progressing     Problem: Skin/Tissue Integrity  Goal: Absence of new skin breakdown  Description: 1. Monitor for areas of redness and/or skin breakdown  2. Assess vascular access sites hourly  3. Every 4-6 hours minimum:  Change oxygen saturation probe site  4. Every 4-6 hours:  If on nasal continuous positive airway pressure, respiratory therapy assess nares and determine need for appliance change or resting period.   10/19/2023 1106 by Kaitlin Truong RN  Outcome: Progressing     Problem: Safety - Adult  Goal: Free from fall injury  10/19/2023 1106 by Kaitlin Truong RN  Outcome: Progressing

## 2023-10-19 NOTE — CARE COORDINATION
CASE MANAGEMENT. ... Met with patient at the bedside. Ms Fei Stevens is independent from home with her daughter. Her bedroom is in the basement. States she can tolerate the steps using the handrail. She has no dme and no raza/hhc history. Discharge plan is for her to return home. She is not interested in raza/hhc. Iv zosyn stopped d/t poss allergic rxn. Started on iv rocephin. ?? iv atbs at [facility]. Prelim bld cx neg. Will follow.

## 2023-10-20 VITALS
WEIGHT: 116.4 LBS | HEART RATE: 55 BPM | BODY MASS INDEX: 21.98 KG/M2 | OXYGEN SATURATION: 99 % | DIASTOLIC BLOOD PRESSURE: 66 MMHG | HEIGHT: 61 IN | TEMPERATURE: 98.4 F | RESPIRATION RATE: 16 BRPM | SYSTOLIC BLOOD PRESSURE: 140 MMHG

## 2023-10-20 PROBLEM — L03.116 CELLULITIS OF LEFT FOOT: Status: RESOLVED | Noted: 2023-10-18 | Resolved: 2023-10-20

## 2023-10-20 LAB
MICROORGANISM SPEC CULT: NORMAL
SPECIMEN DESCRIPTION: NORMAL

## 2023-10-20 PROCEDURE — 6370000000 HC RX 637 (ALT 250 FOR IP)

## 2023-10-20 PROCEDURE — 6360000002 HC RX W HCPCS

## 2023-10-20 PROCEDURE — 2580000003 HC RX 258

## 2023-10-20 PROCEDURE — 6370000000 HC RX 637 (ALT 250 FOR IP): Performed by: STUDENT IN AN ORGANIZED HEALTH CARE EDUCATION/TRAINING PROGRAM

## 2023-10-20 PROCEDURE — 99238 HOSP IP/OBS DSCHRG MGMT 30/<: CPT | Performed by: FAMILY MEDICINE

## 2023-10-20 PROCEDURE — 2580000003 HC RX 258: Performed by: STUDENT IN AN ORGANIZED HEALTH CARE EDUCATION/TRAINING PROGRAM

## 2023-10-20 PROCEDURE — 6360000002 HC RX W HCPCS: Performed by: STUDENT IN AN ORGANIZED HEALTH CARE EDUCATION/TRAINING PROGRAM

## 2023-10-20 RX ORDER — ROSUVASTATIN CALCIUM 10 MG/1
10 TABLET, COATED ORAL DAILY
Qty: 30 TABLET | Refills: 3 | Status: SHIPPED | OUTPATIENT
Start: 2023-10-21 | End: 2023-10-24

## 2023-10-20 RX ORDER — CEFDINIR 300 MG/1
300 CAPSULE ORAL 2 TIMES DAILY
Qty: 10 CAPSULE | Refills: 0 | Status: SHIPPED | OUTPATIENT
Start: 2023-10-20 | End: 2023-10-25

## 2023-10-20 RX ADMIN — WATER 1000 MG: 1 INJECTION INTRAMUSCULAR; INTRAVENOUS; SUBCUTANEOUS at 13:43

## 2023-10-20 RX ADMIN — FUROSEMIDE 20 MG: 20 TABLET ORAL at 07:59

## 2023-10-20 RX ADMIN — ROSUVASTATIN CALCIUM 10 MG: 10 TABLET, FILM COATED ORAL at 07:59

## 2023-10-20 RX ADMIN — ENOXAPARIN SODIUM 30 MG: 100 INJECTION SUBCUTANEOUS at 07:59

## 2023-10-20 RX ADMIN — SODIUM CHLORIDE, PRESERVATIVE FREE 10 ML: 5 INJECTION INTRAVENOUS at 07:59

## 2023-10-20 ASSESSMENT — ENCOUNTER SYMPTOMS
COLOR CHANGE: 1
ABDOMINAL PAIN: 0
DIARRHEA: 0
TROUBLE SWALLOWING: 0
VOMITING: 0
NAUSEA: 0
SHORTNESS OF BREATH: 0
NAUSEA: 0
CONSTIPATION: 0
TROUBLE SWALLOWING: 0
SORE THROAT: 0
SHORTNESS OF BREATH: 0
COUGH: 0
COLOR CHANGE: 1
CONSTIPATION: 0
COUGH: 0
SORE THROAT: 0
VOMITING: 0
ABDOMINAL PAIN: 0
DIARRHEA: 0

## 2023-10-20 NOTE — PLAN OF CARE
Problem: Discharge Planning  Goal: Discharge to home or other facility with appropriate resources  10/20/2023 0846 by Elmira Tavarez RN  Outcome: Progressing     Problem: Skin/Tissue Integrity  Goal: Absence of new skin breakdown  Description: 1. Monitor for areas of redness and/or skin breakdown  2. Assess vascular access sites hourly  3. Every 4-6 hours minimum:  Change oxygen saturation probe site  4. Every 4-6 hours:  If on nasal continuous positive airway pressure, respiratory therapy assess nares and determine need for appliance change or resting period.   10/20/2023 0846 by Elmira Tavarez RN  Outcome: Progressing     Problem: Safety - Adult  Goal: Free from fall injury  10/20/2023 0846 by Elmira Tavarez RN  Outcome: Progressing

## 2023-10-20 NOTE — CARE COORDINATION
CASE MANAGEMENT. ... Discharge order on chart. Per AVS, patient will be sent  home on po [facility]. Met with patient at the bedside. Confirmed no needs. Will have transport home.

## 2023-10-20 NOTE — DISCHARGE SUMMARY
toxic-appearing. HENT:      Head: Normocephalic and atraumatic. Eyes:      General: No scleral icterus. Cardiovascular:      Rate and Rhythm: Normal rate and regular rhythm. Heart sounds: Murmur heard. Comments: Systolic murmur  Pulmonary:      Effort: No respiratory distress. Breath sounds: No wheezing, rhonchi or rales. Abdominal:      General: There is no distension. Palpations: Abdomen is soft. Tenderness: There is no abdominal tenderness. There is no guarding. Musculoskeletal:         General: Swelling, tenderness and signs of injury present. Right lower leg: No edema. Skin:     General: Skin is warm and dry. Findings: Erythema and lesion present. Comments: Left lower extremity with resolving area of erythema from ankle up to above the knee. Still warmer than the contralateral leg. Minor tenderness palpation areas of excoriation and crusting above the knee. Overall improved from yesterday. Neurological:      Mental Status: She is alert.    Discharge Plan   Disposition: Home    Provider Follow-Up:   Flaca Nichols MD  [address]  [address] [address]  [phone]             Hospital/Incidental Findings Requiring Follow-Up:  Blood cultures     Patient Instructions   Diet: regular diet    Activity: activity as tolerated    Other Instructions:   none    Discharge Medications         Medication List        START taking these medications      cefdinir 300 MG capsule  Commonly known as: OMNICEF  Take 1 capsule by mouth 2 times daily for 5 days            CHANGE how you take these medications      rosuvastatin 10 MG tablet  Commonly known as: CRESTOR  Take 1 tablet by mouth daily  Start taking on: October 21, 2023  What changed:   medication strength  how much to take            CONTINUE taking these medications      ALPRAZolam 0.5 MG tablet  Commonly known as: XANAX  Take 1 tablet by mouth nightly as needed for Sleep or Anxiety for up to 60

## 2023-10-22 LAB
MICROORGANISM SPEC CULT: NORMAL
MICROORGANISM SPEC CULT: NORMAL
SERVICE CMNT-IMP: NORMAL
SERVICE CMNT-IMP: NORMAL
SPECIMEN DESCRIPTION: NORMAL
SPECIMEN DESCRIPTION: NORMAL

## 2023-10-23 ENCOUNTER — CARE COORDINATION (OUTPATIENT)
Dept: CARE COORDINATION | Age: 88
End: 2023-10-23

## 2023-10-23 DIAGNOSIS — L03.90 CELLULITIS, UNSPECIFIED CELLULITIS SITE: Primary | ICD-10-CM

## 2023-10-23 PROCEDURE — 1111F DSCHRG MED/CURRENT MED MERGE: CPT

## 2023-10-23 NOTE — CARE COORDINATION
Care Transitions Initial Follow Up Call    Call within 2 business days of discharge: Yes    Patient Current Location:  Home: [facility] [facility] [phone] [facility] [address]    Care Transition Nurse contacted the patient and pt's daughter, Basil Chi (On HIPAA communication release form),  by telephone to perform post hospital discharge assessment. Provided introduction to self, and explanation of the Care Transition Nurse role. Patient: Lynette Escobedo Patient : 1935   MRN: [id number]  Reason for Admission: Cellulitis  Discharge Date: 10/20/23 RARS: Readmission Risk Score: 10.8      Last Discharge Facility       Date Complaint Diagnosis Description Type Department Provider    10/18/23   Admission (Discharged) JEREMIAH Waite MD            Was this an external facility discharge? No     Challenges to be reviewed by the provider   Additional needs identified to be addressed with provider: Yes  medications-Clarify Crestor dose. Dtr states supposed to be 5 mg (from order on 10/16 at [address]) and was given rx at discharge for 10 mg. Dtr has been giving 5 mg and wants clarification. Dtr states that top of pt's L foot and around ankle more swollen today as compared to discharge day. Lasix 20 mg QOD given today. Pt also had 2-3 bouts of diarrhea since last night since starting oral antibiotics. Please advise on above. Method of communication with provider: chart routing. CTN called and spoke with both the pt and her daughter, Basil Chi, for an initial care transition call. Pt admitted on 10/18/23 from pcp's OV d/t LLE cellulitis. Failed OP treatment for cellulitis. Per chart review started on Doxycycline (x10d) on 10/16. Pt only had 4 doses and noted worsening in redness at L knee down to ankle. Pt states that she is doing better. Denies any fever/chills.  She reports that LLE redness and swelling improving with each day, however, daughter does state that top of L foot and around L ankle swelling worse

## 2023-10-23 NOTE — PROGRESS NOTES
CLINICAL PHARMACY NOTE: MEDS TO BEDS    Total # of Prescriptions Filled: 2   The following medications were delivered to the patient:  Rosuvastatin 10 mg  Cefdinir 300 mg    Additional Documentation:

## 2023-10-24 ENCOUNTER — OFFICE VISIT (OUTPATIENT)
Dept: FAMILY MEDICINE CLINIC | Age: 88
End: 2023-10-24

## 2023-10-24 VITALS
OXYGEN SATURATION: 97 % | HEART RATE: 76 BPM | SYSTOLIC BLOOD PRESSURE: 104 MMHG | DIASTOLIC BLOOD PRESSURE: 59 MMHG | BODY MASS INDEX: 20.77 KG/M2 | HEIGHT: 61 IN | RESPIRATION RATE: 16 BRPM | WEIGHT: 110 LBS

## 2023-10-24 DIAGNOSIS — E78.2 MIXED HYPERLIPIDEMIA: ICD-10-CM

## 2023-10-24 DIAGNOSIS — L03.90 WOUND CELLULITIS: ICD-10-CM

## 2023-10-24 DIAGNOSIS — Z09 HOSPITAL DISCHARGE FOLLOW-UP: ICD-10-CM

## 2023-10-24 DIAGNOSIS — I95.9 HYPOTENSION, UNSPECIFIED HYPOTENSION TYPE: ICD-10-CM

## 2023-10-24 RX ORDER — ROSUVASTATIN CALCIUM 5 MG/1
5 TABLET, COATED ORAL DAILY
Qty: 30 TABLET | Refills: 0
Start: 2023-10-24

## 2023-10-24 NOTE — PROGRESS NOTES
S: 81 y.o. female with   Chief Complaint   Patient presents with    Care Management     TCM       Pt is here for hospital follow up. Pt has not been taking all of the medicine bc she was not eating regularly. Swelling and redness has improved. O: VS:  height is 1.54 m (5' 0.63\") and weight is 49.9 kg (110 lb). Her blood pressure is 104/59 (abnormal) and her pulse is 76. Her respiration is 16 and oxygen saturation is 97%. BP Readings from Last 3 Encounters:   10/24/23 (!) 104/59   10/20/23 (!) 140/66   10/18/23 126/77     See resident note    Impression/Plan:   1) cellulitis - cont on current meds until complete. 2) hospital discharge - meds reconcilled. 3) hypotension - no current symptoms. Health Maintenance Due   Topic Date Due    Shingles vaccine (1 of 2) Never done    COVID-19 Vaccine (4 - Pfizer series) 11/25/2021         Attending Physician Statement  I have discussed the case, including pertinent history and exam findings with the resident. I also have seen the patient and performed key portions of the examination. I agree with the documented assessment and plan.       Farnk Perez MD
is in no pain    Patient denies lightheadedness or dizziness, pressures soft 107/57, 104/59, denies recent falls    Crestor 10 mg daily, decreased to 5 mg outpatient, changed in chart    Inpatient course: Discharge summary reviewed- see chart. Interval history/Current status: stable, appropriate for outpatient abx treatment    Patient Active Problem List   Diagnosis    Moderate aortic stenosis    COPD (chronic obstructive pulmonary disease) ([facility])    Disorder of bone    Fatigue    Hyperlipidemia    Smoker    Immunization counseling    Anxiety    Primary insomnia    CKD (chronic kidney disease) stage 3, GFR 30-59 ml/min ([facility])    Generalized anxiety disorder    Allergic rhinitis    Flu vaccine need    Acute atopic conjunctivitis    Contact dermatitis    Disability of walking    Ectropion of lower eyelid    Nuclear sclerotic cataract    Onychomycosis    Pain in limb    Diastolic dysfunction    Bilateral lower extremity edema    Hypotension    Cellulitis       Medications listed as ordered at the time of discharge from hospital     Medication List            Accurate as of October 24, 2023  4:57 PM. If you have any questions, ask your nurse or doctor. CHANGE how you take these medications      rosuvastatin 5 MG tablet  Commonly known as: CRESTOR  Take 1 tablet by mouth daily  What changed:   medication strength  how much to take  Changed by: Angélica Spence taking these medications      ALPRAZolam 0.5 MG tablet  Commonly known as: XANAX  Take 1 tablet by mouth nightly as needed for Sleep or Anxiety for up to 60 days. Max Daily Amount: 0.5 mg     cefdinir 300 MG capsule  Commonly known as: OMNICEF  Take 1 capsule by mouth 2 times daily for 5 days     furosemide 20 MG tablet  Commonly known as: Lasix  Take 1 tablet by mouth every other day.                Where to Get Your Medications        Information about where to get these medications is not yet available    Ask your nurse or

## 2023-10-30 DIAGNOSIS — Z76.0 MEDICATION REFILL: ICD-10-CM

## 2023-10-30 RX ORDER — ALPRAZOLAM 0.5 MG/1
0.5 TABLET ORAL NIGHTLY PRN
Qty: 30 TABLET | Refills: 0 | Status: SHIPPED | OUTPATIENT
Start: 2023-10-30 | End: 2023-12-29

## 2023-10-30 NOTE — TELEPHONE ENCOUNTER
Refill request    Xanax 0.5mg    Isma[address] Palafox [address]     Last Appointment   10/24/2023  Next Appointment  12/5/2023

## 2023-11-01 ENCOUNTER — CARE COORDINATION (OUTPATIENT)
Dept: CARE COORDINATION | Age: 88
End: 2023-11-01

## 2023-11-01 NOTE — CARE COORDINATION
Care Transitions Outreach Attempt    Call within 2 business days of discharge: Yes   Attempted to reach patient for transitions of care follow up. Spoke with Pt's daughter [employer] (on HIPAA Communication Release Form). [employer] states that she is at work and is requesting a call back at another time. PCP Hosp F/U completed 10/24/23   - Crestor 10 mg daily decreased to 5 mg daily    Patient: Cali Becerra Patient : 1935 MRN: <[id number]>  Reason for Admission: JEREMIAH 10/18-10/20/23 Cellulitis  Discharge Date: 10/20/23 RARS: Readmission Risk Score: 10.8    Last Discharge Facility       Date Complaint Diagnosis Description Type Department Provider    10/18/23   Admission (Discharged) JEREMIAH 4S Delisa Elise MD          Was this an external facility discharge?  No   Noted following upcoming appointments from discharge chart review:   [facility] follow up appointment(s):   Future Appointments   Date Time Provider [address]   2023  1:20 PM Leti Cihn DO [facility]     Non-[facility]  follow up appointment(s):     Maris Snyder LPN

## 2023-11-07 ENCOUNTER — CARE COORDINATION (OUTPATIENT)
Dept: CARE COORDINATION | Age: 88
End: 2023-11-07

## 2023-11-07 DIAGNOSIS — R60.0 PEDAL EDEMA: ICD-10-CM

## 2023-11-07 DIAGNOSIS — R60.0 BILATERAL LOWER EXTREMITY EDEMA: ICD-10-CM

## 2023-11-07 RX ORDER — FUROSEMIDE 20 MG/1
TABLET ORAL
Qty: 45 TABLET | Refills: 1 | Status: SHIPPED | OUTPATIENT
Start: 2023-11-07

## 2023-11-07 NOTE — CARE COORDINATION
Care Transitions Follow Up Call    Patient Current Location: [address] [place] [address] Transition Nurse contacted the  pt's daughter, Sharla Thompson (On HIPAA communication release form),  by telephone to follow up after admission on 10/18/23. Patient: Libra Morrissey  Patient : 1935   MRN: [id number]  Reason for Admission: Cellulitis  Discharge Date: 10/20/23 RARS: Readmission Risk Score: 10.8      Needs to be reviewed by the provider   Additional needs identified to be addressed with provider: No  none             Method of communication with provider: none. CTN called and spoke with the pt's daughter, Sharla Thompson (On HIPAA communication release form), for a sub care transition call. Pt admitted on 10/18/23 from pcp's OV d/t LLE cellulitis. Failed OP treatment for cellulitis. Per chart review started on Doxycycline (x10d) on 10/16. Pt only had 4 doses and noted worsening in redness at L knee down to ankle. Sharla Thompson states, \"so far, so good,\" reports that pt seems to be at her baseline. Denies no further redness or swelling to LLE or foot. Denies pt has had any fever/chills. Denies any recurrent diarrhea. She states that the pt completed her atb as prescribed. Pt completed a HFU appt with her pcp on 10/24/23. Clarified that Crestor to be 5 mg daily and not 10 mg daily. No new orders given at this visit. Pt will f/u in Dec again. Sharla Thompson states that pt is having a home visit from a NP from [address] [place] [address] on 23 @ 9 am.     Sharla Thompson voices no concerns/needs. She is agreeable to ongoing outreaches. Addressed changes since last contact:  medications-Completed atbs  Completed HFU appt with pcp on 10/24. Scheduled f/u 23.       Follow Up  Future Appointments   Date Time Provider [address]   2023  1:20 PM [facility]     Care Transition Nurse reviewed medical action plan and red flags with family and discussed any barriers to care and/or understanding of plan of care after

## 2023-11-16 ENCOUNTER — CARE COORDINATION (OUTPATIENT)
Dept: CARE COORDINATION | Age: 88
End: 2023-11-16

## 2023-11-16 NOTE — CARE COORDINATION
Care Transitions Follow Up Call    Patient Current Location: [address] [place] [address] Transition Nurse contacted the  pt's daughter, Farhan Darling (On HIPAA communication release form),  by telephone to follow up after admission on 10/18/23. Patient: Crissy Mo  Patient : 1935   MRN: [id number]  Reason for Admission: Cellulitis  Discharge Date: 10/20/23 RARS: Readmission Risk Score: 10.8      Needs to be reviewed by the provider   Additional needs identified to be addressed with provider: No  none             Method of communication with provider: none. CTN called and spoke with the pt's daughter, Farhan Darling (On HIPAA communication release form), for a sub care transition call. Pt admitted on 10/18/23 from pcp's OV d/t LLE cellulitis. Failed OP treatment for cellulitis. Per chart review started on Doxycycline (x10d) on 10/16. Pt only had 4 doses and noted worsening in redness at L knee down to ankle. Farhan Darling states that the pt is doing very well and states that the pt's LLE is \"healed up. \" Denies any recurrent redness to LLE. She feels as if the pt is at her baseline. Pt completed a recent HFU appt with her pcp on 10/24/23. Pt scheduled to f/u on 23 with her pcp again. Farhan Darling does not feel any further outreaches are necessary, as she has no needs/concerns at this time. CTN will sign off at this time. Addressed changes since last contact:  none    Follow Up  Future Appointments   Date Time Provider [facility] [address]   2023  1:20 PM [facility] Strickland [facility]     Care Transition Nurse reviewed medical action plan and red flags with family and discussed any barriers to care and/or understanding of plan of care after discharge. Discussed appropriate site of care based on symptoms and resources available to patient including: PCP  Urgent care clinics  When to call [facility]. The family agrees to contact the PCP office for questions related to their healthcare.       Care Transitions

## 2023-12-01 DIAGNOSIS — Z76.0 MEDICATION REFILL: ICD-10-CM

## 2023-12-01 NOTE — TELEPHONE ENCOUNTER
Refill request    Alprazolam 0.5mg    IVANNA Palafox Dr    Last Appointment   10/24/2023  Next Appointment  12/5/2023    *pt is out of medication

## 2023-12-02 DIAGNOSIS — Z76.0 MEDICATION REFILL: ICD-10-CM

## 2023-12-04 RX ORDER — ALPRAZOLAM 0.5 MG/1
TABLET ORAL
Qty: 30 TABLET | Refills: 0 | OUTPATIENT
Start: 2023-12-04

## 2023-12-04 RX ORDER — ALPRAZOLAM 0.5 MG/1
0.5 TABLET ORAL NIGHTLY PRN
Qty: 30 TABLET | Refills: 0 | Status: SHIPPED | OUTPATIENT
Start: 2023-12-04 | End: 2024-02-02

## 2023-12-05 ENCOUNTER — OFFICE VISIT (OUTPATIENT)
Dept: FAMILY MEDICINE CLINIC | Age: 88
End: 2023-12-05
Payer: COMMERCIAL

## 2023-12-05 VITALS
RESPIRATION RATE: 16 BRPM | SYSTOLIC BLOOD PRESSURE: 112 MMHG | DIASTOLIC BLOOD PRESSURE: 88 MMHG | OXYGEN SATURATION: 94 % | HEIGHT: 61 IN | TEMPERATURE: 97.1 F | BODY MASS INDEX: 19.77 KG/M2 | WEIGHT: 104.72 LBS | HEART RATE: 56 BPM

## 2023-12-05 DIAGNOSIS — Z76.0 MEDICATION REFILL: ICD-10-CM

## 2023-12-05 DIAGNOSIS — Z79.899 CHRONIC PRESCRIPTION BENZODIAZEPINE USE: Primary | ICD-10-CM

## 2023-12-05 PROCEDURE — 1123F ACP DISCUSS/DSCN MKR DOCD: CPT

## 2023-12-05 PROCEDURE — 99213 OFFICE O/P EST LOW 20 MIN: CPT

## 2023-12-06 ASSESSMENT — ENCOUNTER SYMPTOMS
VOMITING: 0
NAUSEA: 0
SHORTNESS OF BREATH: 0
CONSTIPATION: 0
ABDOMINAL PAIN: 0
DIARRHEA: 0

## 2023-12-15 DIAGNOSIS — E78.2 MIXED HYPERLIPIDEMIA: ICD-10-CM

## 2023-12-15 RX ORDER — ROSUVASTATIN CALCIUM 5 MG/1
5 TABLET, COATED ORAL DAILY
Qty: 30 TABLET | Refills: 2 | Status: SHIPPED | OUTPATIENT
Start: 2023-12-15

## 2024-01-03 DIAGNOSIS — Z76.0 MEDICATION REFILL: ICD-10-CM

## 2024-01-03 RX ORDER — ALPRAZOLAM 0.5 MG/1
TABLET ORAL
Qty: 30 TABLET | Refills: 0 | OUTPATIENT
Start: 2024-01-03

## 2024-01-03 RX ORDER — ALPRAZOLAM 0.5 MG/1
0.5 TABLET ORAL NIGHTLY PRN
Qty: 30 TABLET | Refills: 0 | Status: SHIPPED | OUTPATIENT
Start: 2024-01-03 | End: 2024-03-03

## 2024-01-03 NOTE — TELEPHONE ENCOUNTER
Refill request    Alprazolam 0.5mg    IVANNA Palafox Dr     Last Appointment   12/5/2023  Next Appointment  3/5/2024

## 2024-01-07 NOTE — TELEPHONE ENCOUNTER
Last Appointment   4/28/2023  Next Appointment  5/26/2023 PATIENT EDUCATION    Your urine culture is pending.  Our clinic will contact you with these results.    Take antibiotic as instructed.  Complete course.      If you develop symptoms of a yeast infection, once you have fully completed the antibiotic, take Diflucan as instructed.      Push oral fluids.    Call to arrange a follow-up appointment with your primary care provider.      Return to the urgent care or go to the emergency department for any new or worsening symptoms.

## 2024-01-29 DIAGNOSIS — R60.0 PEDAL EDEMA: ICD-10-CM

## 2024-01-29 DIAGNOSIS — R60.0 BILATERAL LOWER EXTREMITY EDEMA: ICD-10-CM

## 2024-01-29 DIAGNOSIS — E78.2 MIXED HYPERLIPIDEMIA: ICD-10-CM

## 2024-01-29 DIAGNOSIS — Z76.0 MEDICATION REFILL: ICD-10-CM

## 2024-01-29 RX ORDER — ROSUVASTATIN CALCIUM 5 MG/1
5 TABLET, COATED ORAL DAILY
Qty: 30 TABLET | Refills: 2 | Status: ON HOLD | OUTPATIENT
Start: 2024-01-29

## 2024-01-29 RX ORDER — FUROSEMIDE 20 MG/1
TABLET ORAL
Qty: 45 TABLET | Refills: 1 | Status: ON HOLD | OUTPATIENT
Start: 2024-01-29

## 2024-01-29 RX ORDER — ALPRAZOLAM 0.5 MG/1
0.5 TABLET ORAL NIGHTLY PRN
Qty: 30 TABLET | Refills: 0 | Status: ON HOLD | OUTPATIENT
Start: 2024-01-29 | End: 2024-03-29

## 2024-01-29 NOTE — TELEPHONE ENCOUNTER
Refill request    Furosemide 20mg (8 pills left)    Rosuvastatin 5mg (8 pills left)    Alprazolam 0.5mg (3 pills left after tonight)    [facility] @ [facility]     Last Appointment   12/5/2023  Next Appointment  3/5/2024

## 2024-01-29 NOTE — TELEPHONE ENCOUNTER
PDMP reviewed and appropriate. Refills sent.    Leti Chin DO  Family Medicine Resident, PGY-2  [facility]  1/29/2024 12:50 PM

## 2024-02-04 ENCOUNTER — APPOINTMENT (OUTPATIENT)
Dept: GENERAL RADIOLOGY | Age: 89
DRG: 947 | End: 2024-02-04
Payer: COMMERCIAL

## 2024-02-04 ENCOUNTER — HOSPITAL ENCOUNTER (INPATIENT)
Age: 89
LOS: 1 days | Discharge: HOME | DRG: 947 | End: 2024-02-05
Attending: EMERGENCY MEDICINE | Admitting: STUDENT IN AN ORGANIZED HEALTH CARE EDUCATION/TRAINING PROGRAM
Payer: COMMERCIAL

## 2024-02-04 DIAGNOSIS — E43 SEVERE PROTEIN-CALORIE MALNUTRITION (HCC): Chronic | ICD-10-CM

## 2024-02-04 DIAGNOSIS — R53.1 GENERALIZED WEAKNESS: ICD-10-CM

## 2024-02-04 DIAGNOSIS — Z78.9 UNABLE TO CARE FOR SELF: Primary | ICD-10-CM

## 2024-02-04 LAB
ANION GAP SERPL CALCULATED.3IONS-SCNC: 12 MMOL/L (ref 7–16)
BASOPHILS # BLD: 0.04 K/UL (ref 0–0.2)
BASOPHILS NFR BLD: 1 % (ref 0–2)
BUN SERPL-MCNC: 29 MG/DL (ref 6–23)
CALCIUM SERPL-MCNC: 9.5 MG/DL (ref 8.6–10.2)
CHLORIDE SERPL-SCNC: 101 MMOL/L (ref 98–107)
CO2 SERPL-SCNC: 25 MMOL/L (ref 22–29)
CREAT SERPL-MCNC: 1.1 MG/DL (ref 0.5–1)
EOSINOPHIL # BLD: 0.01 K/UL (ref 0.05–0.5)
EOSINOPHILS RELATIVE PERCENT: 0 % (ref 0–6)
ERYTHROCYTE [DISTWIDTH] IN BLOOD BY AUTOMATED COUNT: 13.3 % (ref 11.5–15)
GFR SERPL CREATININE-BSD FRML MDRD: 47 ML/MIN/1.73M2
GLUCOSE SERPL-MCNC: 84 MG/DL (ref 74–99)
HCT VFR BLD AUTO: 42.3 % (ref 34–48)
HGB BLD-MCNC: 13.4 G/DL (ref 11.5–15.5)
IMM GRANULOCYTES # BLD AUTO: <0.03 K/UL (ref 0–0.58)
IMM GRANULOCYTES NFR BLD: 0 % (ref 0–5)
LYMPHOCYTES NFR BLD: 1.93 K/UL (ref 1.5–4)
LYMPHOCYTES RELATIVE PERCENT: 34 % (ref 20–42)
MCH RBC QN AUTO: 33.1 PG (ref 26–35)
MCHC RBC AUTO-ENTMCNC: 31.7 G/DL (ref 32–34.5)
MCV RBC AUTO: 104.4 FL (ref 80–99.9)
MONOCYTES NFR BLD: 0.33 K/UL (ref 0.1–0.95)
MONOCYTES NFR BLD: 6 % (ref 2–12)
NEUTROPHILS NFR BLD: 59 % (ref 43–80)
NEUTS SEG NFR BLD: 3.29 K/UL (ref 1.8–7.3)
PLATELET, FLUORESCENCE: 168 K/UL (ref 130–450)
PMV BLD AUTO: 13.4 FL (ref 7–12)
POTASSIUM SERPL-SCNC: 4.2 MMOL/L (ref 3.5–5)
RBC # BLD AUTO: 4.05 M/UL (ref 3.5–5.5)
SODIUM SERPL-SCNC: 138 MMOL/L (ref 132–146)
WBC OTHER # BLD: 5.6 K/UL (ref 4.5–11.5)

## 2024-02-04 PROCEDURE — 1200000000 HC SEMI PRIVATE

## 2024-02-04 PROCEDURE — 6360000002 HC RX W HCPCS

## 2024-02-04 PROCEDURE — 85025 COMPLETE CBC W/AUTO DIFF WBC: CPT

## 2024-02-04 PROCEDURE — 6370000000 HC RX 637 (ALT 250 FOR IP): Performed by: STUDENT IN AN ORGANIZED HEALTH CARE EDUCATION/TRAINING PROGRAM

## 2024-02-04 PROCEDURE — 6370000000 HC RX 637 (ALT 250 FOR IP)

## 2024-02-04 PROCEDURE — 6370000000 HC RX 637 (ALT 250 FOR IP): Performed by: NURSE PRACTITIONER

## 2024-02-04 PROCEDURE — 2580000003 HC RX 258

## 2024-02-04 PROCEDURE — 73030 X-RAY EXAM OF SHOULDER: CPT

## 2024-02-04 PROCEDURE — 80048 BASIC METABOLIC PNL TOTAL CA: CPT

## 2024-02-04 PROCEDURE — 99285 EMERGENCY DEPT VISIT HI MDM: CPT

## 2024-02-04 PROCEDURE — 73060 X-RAY EXAM OF HUMERUS: CPT

## 2024-02-04 RX ORDER — SODIUM CHLORIDE 0.9 % (FLUSH) 0.9 %
5-40 SYRINGE (ML) INJECTION EVERY 12 HOURS SCHEDULED
Status: DISCONTINUED | OUTPATIENT
Start: 2024-02-04 | End: 2024-02-05 | Stop reason: HOSPADM

## 2024-02-04 RX ORDER — POTASSIUM CHLORIDE 7.45 MG/ML
10 INJECTION INTRAVENOUS PRN
Status: DISCONTINUED | OUTPATIENT
Start: 2024-02-04 | End: 2024-02-04

## 2024-02-04 RX ORDER — ONDANSETRON 2 MG/ML
4 INJECTION INTRAMUSCULAR; INTRAVENOUS EVERY 6 HOURS PRN
Status: DISCONTINUED | OUTPATIENT
Start: 2024-02-04 | End: 2024-02-05 | Stop reason: HOSPADM

## 2024-02-04 RX ORDER — ENOXAPARIN SODIUM 100 MG/ML
30 INJECTION SUBCUTANEOUS DAILY
Status: DISCONTINUED | OUTPATIENT
Start: 2024-02-04 | End: 2024-02-05 | Stop reason: HOSPADM

## 2024-02-04 RX ORDER — ACETAMINOPHEN 500 MG
1000 TABLET ORAL ONCE
Status: COMPLETED | OUTPATIENT
Start: 2024-02-04 | End: 2024-02-04

## 2024-02-04 RX ORDER — POTASSIUM CHLORIDE 20 MEQ/1
40 TABLET, EXTENDED RELEASE ORAL PRN
Status: DISCONTINUED | OUTPATIENT
Start: 2024-02-04 | End: 2024-02-04

## 2024-02-04 RX ORDER — ACETAMINOPHEN 650 MG/1
650 SUPPOSITORY RECTAL EVERY 6 HOURS PRN
Status: DISCONTINUED | OUTPATIENT
Start: 2024-02-04 | End: 2024-02-05 | Stop reason: HOSPADM

## 2024-02-04 RX ORDER — ONDANSETRON 4 MG/1
4 TABLET, ORALLY DISINTEGRATING ORAL EVERY 8 HOURS PRN
Status: DISCONTINUED | OUTPATIENT
Start: 2024-02-04 | End: 2024-02-05 | Stop reason: HOSPADM

## 2024-02-04 RX ORDER — ACETAMINOPHEN 325 MG/1
650 TABLET ORAL EVERY 6 HOURS PRN
Status: DISCONTINUED | OUTPATIENT
Start: 2024-02-04 | End: 2024-02-05 | Stop reason: HOSPADM

## 2024-02-04 RX ORDER — MAGNESIUM SULFATE IN WATER 40 MG/ML
2000 INJECTION, SOLUTION INTRAVENOUS PRN
Status: DISCONTINUED | OUTPATIENT
Start: 2024-02-04 | End: 2024-02-04

## 2024-02-04 RX ORDER — ROSUVASTATIN CALCIUM 5 MG/1
5 TABLET, COATED ORAL DAILY
Status: DISCONTINUED | OUTPATIENT
Start: 2024-02-04 | End: 2024-02-05 | Stop reason: HOSPADM

## 2024-02-04 RX ORDER — SODIUM CHLORIDE 9 MG/ML
INJECTION, SOLUTION INTRAVENOUS PRN
Status: DISCONTINUED | OUTPATIENT
Start: 2024-02-04 | End: 2024-02-05 | Stop reason: HOSPADM

## 2024-02-04 RX ORDER — POLYETHYLENE GLYCOL 3350 17 G/17G
17 POWDER, FOR SOLUTION ORAL DAILY PRN
Status: DISCONTINUED | OUTPATIENT
Start: 2024-02-04 | End: 2024-02-05 | Stop reason: HOSPADM

## 2024-02-04 RX ORDER — SODIUM CHLORIDE 0.9 % (FLUSH) 0.9 %
5-40 SYRINGE (ML) INJECTION PRN
Status: DISCONTINUED | OUTPATIENT
Start: 2024-02-04 | End: 2024-02-05 | Stop reason: HOSPADM

## 2024-02-04 RX ORDER — ALPRAZOLAM 0.25 MG/1
0.5 TABLET ORAL NIGHTLY PRN
Status: DISCONTINUED | OUTPATIENT
Start: 2024-02-04 | End: 2024-02-05 | Stop reason: HOSPADM

## 2024-02-04 RX ADMIN — PETROLATUM: 420 OINTMENT TOPICAL at 21:00

## 2024-02-04 RX ADMIN — ALPRAZOLAM 0.5 MG: 0.25 TABLET ORAL at 21:00

## 2024-02-04 RX ADMIN — ACETAMINOPHEN 1000 MG: 500 TABLET ORAL at 11:06

## 2024-02-04 RX ADMIN — ENOXAPARIN SODIUM 30 MG: 100 INJECTION SUBCUTANEOUS at 18:05

## 2024-02-04 RX ADMIN — SODIUM CHLORIDE, PRESERVATIVE FREE 10 ML: 5 INJECTION INTRAVENOUS at 21:01

## 2024-02-04 ASSESSMENT — PAIN SCALES - GENERAL: PAINLEVEL_OUTOF10: 7

## 2024-02-04 NOTE — ED NOTES
ED to Inpatient Handoff Report    Notified awais that electronic handoff available and patient ready for transport to room 504.    Safety Risks: None identified    Patient in Restraints: no    Constant Observer or Patient : no    Telemetry Monitoring Ordered: No          Order to transfer to unit without monitor: NA    Last MEWS: 1 Time completed: 1440    Deterioration Index: 20.31    Vitals:    02/04/24 1045 02/04/24 1439   BP: 127/60 122/69   Pulse: 81 (!) 2   Resp: 16 18   Temp: 98.1 °F (36.7 °C) 97 °F (36.1 °C)   TempSrc: Oral    SpO2: 99% 97%       Opportunity for questions and clarification was provided.  1

## 2024-02-04 NOTE — H&P
Head: Normocephalic and atraumatic.      Mouth/Throat:      Mouth: Mucous membranes are moist.      Pharynx: Oropharynx is clear.   Eyes:      Extraocular Movements: Extraocular movements intact.      Conjunctiva/sclera: Conjunctivae normal.   Cardiovascular:      Rate and Rhythm: Normal rate and regular rhythm.      Pulses: Normal pulses.      Heart sounds: Normal heart sounds. No murmur heard.  Pulmonary:      Effort: Pulmonary effort is normal.      Breath sounds: Normal breath sounds. No wheezing.   Musculoskeletal:      Cervical back: Normal range of motion.      Right lower leg: No edema.      Left lower leg: No edema.      Comments: Tenderness appreciated on right upper arm, range of motion is normal.   Skin:     General: Skin is warm and dry.      Comments: Chronic skin changes present bilaterally lower limb.   Neurological:      General: No focal deficit present.      Mental Status: She is alert and oriented to person, place, and time.   Psychiatric:         Attention and Perception: Attention normal.         Mood and Affect: Mood is depressed. Affect is tearful.         Behavior: Behavior is slowed.         Cognition and Memory: Cognition and memory normal.      Comments: Patient was AAO x 3           LABS:  Recent Results (from the past 24 hour(s))   CBC with Auto Differential    Collection Time: 02/04/24 11:01 AM   Result Value Ref Range    WBC 5.6 4.5 - 11.5 k/uL    RBC 4.05 3.50 - 5.50 m/uL    Hemoglobin 13.4 11.5 - 15.5 g/dL    Hematocrit 42.3 34.0 - 48.0 %    .4 (H) 80.0 - 99.9 fL    MCH 33.1 26.0 - 35.0 pg    MCHC 31.7 (L) 32.0 - 34.5 g/dL    RDW 13.3 11.5 - 15.0 %    MPV 13.4 (H) 7.0 - 12.0 fL    Platelet, Fluorescence 168 130 - 450 k/uL    Neutrophils % 59 43.0 - 80.0 %    Lymphocytes % 34 20.0 - 42.0 %    Monocytes % 6 2.0 - 12.0 %    Eosinophils % 0 0 - 6 %    Basophils % 1 0.0 - 2.0 %    Immature Granulocytes 0 0.0 - 5.0 %    Neutrophils Absolute 3.29 1.80 - 7.30 k/uL    Lymphocytes

## 2024-02-04 NOTE — ED PROVIDER NOTES
Shared SEGUN-ED Attending Visit.  CC: No           [facility]  Department of Emergency Medicine   ED  Encounter Note  Admit Date/RoomTime: 2024 10:47 AM  ED Room: 0506/0506-A    NAME: Virginia Ahuja  : 1935  MRN: [id number]     Chief Complaint:  Arm Pain (Right arm, states that she ran into a pole x2 weeks ago, worse with movement, - fall, - head injury, - cp or sob)    History of Present Illness       Virginia Ahuja is a 88 y.o. old female who presents to the emergency department by private vehicle, for traumatic Right upper arm pain which occured 2 week(s) prior to arrival.  The complaint is due to is suppose to use a walker or cane but just  uses furniture to hold on to.  Patient states that there was nothing to hold onto in the basement and she lost her balance and hit her arm against a metal pole.  Patient has no prior history of pain/injury with regards to today's visit.  She is right handed.   Since onset the symptoms have been remaining constant.  Her pain is aggraveated by any movement or pressure on or palpation of painful area and relieved by nothing, as no treatment has been provided prior to this visit. She denies any head injury, headache, loss of consciousness, confusion, dizziness, neck pain, chest pain, abdominal pain, back pain, numbness, weakness, blurred vision, nausea, vomiting, fever, chills, wounds, or rash.  Patient's daughter states that she is unable to leave patient home alone and has to go back to work on Tuesday.  She states that she feels that she is unable to take care of her at this time.  Would like patient placed for rehab facility.    ROS   Pertinent positives and negatives are stated within HPI, all other systems reviewed and are negative.    Past Medical History:  has a past medical history of Aortic stenosis, COPD (chronic obstructive pulmonary disease) (HCC), Disorder of bone, Elevated hemoglobin A1c, Fatigue, Hyperlipidemia, Hypertension, Open wound

## 2024-02-05 VITALS
DIASTOLIC BLOOD PRESSURE: 78 MMHG | OXYGEN SATURATION: 99 % | TEMPERATURE: 98.2 F | RESPIRATION RATE: 16 BRPM | BODY MASS INDEX: 19.16 KG/M2 | HEART RATE: 60 BPM | HEIGHT: 61 IN | SYSTOLIC BLOOD PRESSURE: 112 MMHG | WEIGHT: 101.5 LBS

## 2024-02-05 PROBLEM — E43 SEVERE PROTEIN-CALORIE MALNUTRITION (HCC): Chronic | Status: ACTIVE | Noted: 2024-02-05

## 2024-02-05 PROBLEM — Z78.9 UNABLE TO CARE FOR SELF: Status: ACTIVE | Noted: 2024-02-05

## 2024-02-05 PROBLEM — R53.1 GENERALIZED WEAKNESS: Status: RESOLVED | Noted: 2024-02-04 | Resolved: 2024-02-05

## 2024-02-05 LAB
ALBUMIN SERPL-MCNC: 3.5 G/DL (ref 3.5–5.2)
ALP SERPL-CCNC: 76 U/L (ref 35–104)
ALT SERPL-CCNC: 27 U/L (ref 0–32)
ANION GAP SERPL CALCULATED.3IONS-SCNC: 11 MMOL/L (ref 7–16)
AST SERPL-CCNC: 38 U/L (ref 0–31)
BASOPHILS # BLD: 0.04 K/UL (ref 0–0.2)
BASOPHILS NFR BLD: 1 % (ref 0–2)
BILIRUB DIRECT SERPL-MCNC: <0.2 MG/DL (ref 0–0.3)
BILIRUB INDIRECT SERPL-MCNC: ABNORMAL MG/DL (ref 0–1)
BILIRUB SERPL-MCNC: 0.4 MG/DL (ref 0–1.2)
BUN SERPL-MCNC: 27 MG/DL (ref 6–23)
CALCIUM SERPL-MCNC: 8.8 MG/DL (ref 8.6–10.2)
CHLORIDE SERPL-SCNC: 105 MMOL/L (ref 98–107)
CK SERPL-CCNC: 54 U/L (ref 20–180)
CO2 SERPL-SCNC: 26 MMOL/L (ref 22–29)
CREAT SERPL-MCNC: 1.2 MG/DL (ref 0.5–1)
EOSINOPHIL # BLD: 0 K/UL (ref 0.05–0.5)
EOSINOPHILS RELATIVE PERCENT: 0 % (ref 0–6)
ERYTHROCYTE [DISTWIDTH] IN BLOOD BY AUTOMATED COUNT: 13.2 % (ref 11.5–15)
ERYTHROCYTE [SEDIMENTATION RATE] IN BLOOD BY WESTERGREN METHOD: 12 MM/HR (ref 0–20)
FOLATE SERPL-MCNC: 10.6 NG/ML (ref 4.8–24.2)
GFR SERPL CREATININE-BSD FRML MDRD: 45 ML/MIN/1.73M2
GLUCOSE SERPL-MCNC: 71 MG/DL (ref 74–99)
HCT VFR BLD AUTO: 36.2 % (ref 34–48)
HGB BLD-MCNC: 11.4 G/DL (ref 11.5–15.5)
IMM GRANULOCYTES # BLD AUTO: <0.03 K/UL (ref 0–0.58)
IMM GRANULOCYTES NFR BLD: 0 % (ref 0–5)
LYMPHOCYTES NFR BLD: 2.65 K/UL (ref 1.5–4)
LYMPHOCYTES RELATIVE PERCENT: 51 % (ref 20–42)
MCH RBC QN AUTO: 32.9 PG (ref 26–35)
MCHC RBC AUTO-ENTMCNC: 31.5 G/DL (ref 32–34.5)
MCV RBC AUTO: 104.3 FL (ref 80–99.9)
MONOCYTES NFR BLD: 0.39 K/UL (ref 0.1–0.95)
MONOCYTES NFR BLD: 8 % (ref 2–12)
NEUTROPHILS NFR BLD: 41 % (ref 43–80)
NEUTS SEG NFR BLD: 2.11 K/UL (ref 1.8–7.3)
PLATELET, FLUORESCENCE: 149 K/UL (ref 130–450)
PMV BLD AUTO: 13.6 FL (ref 7–12)
POTASSIUM SERPL-SCNC: 3.9 MMOL/L (ref 3.5–5)
PROT SERPL-MCNC: 6 G/DL (ref 6.4–8.3)
RBC # BLD AUTO: 3.47 M/UL (ref 3.5–5.5)
SODIUM SERPL-SCNC: 142 MMOL/L (ref 132–146)
TSH SERPL DL<=0.05 MIU/L-ACNC: 2.79 UIU/ML (ref 0.27–4.2)
VIT B12 SERPL-MCNC: 759 PG/ML (ref 211–946)
WBC OTHER # BLD: 5.2 K/UL (ref 4.5–11.5)

## 2024-02-05 PROCEDURE — 97161 PT EVAL LOW COMPLEX 20 MIN: CPT

## 2024-02-05 PROCEDURE — 82746 ASSAY OF FOLIC ACID SERUM: CPT

## 2024-02-05 PROCEDURE — 6370000000 HC RX 637 (ALT 250 FOR IP)

## 2024-02-05 PROCEDURE — 36415 COLL VENOUS BLD VENIPUNCTURE: CPT

## 2024-02-05 PROCEDURE — 97165 OT EVAL LOW COMPLEX 30 MIN: CPT

## 2024-02-05 PROCEDURE — 6360000002 HC RX W HCPCS

## 2024-02-05 PROCEDURE — 84443 ASSAY THYROID STIM HORMONE: CPT

## 2024-02-05 PROCEDURE — 82248 BILIRUBIN DIRECT: CPT

## 2024-02-05 PROCEDURE — 85652 RBC SED RATE AUTOMATED: CPT

## 2024-02-05 PROCEDURE — 82607 VITAMIN B-12: CPT

## 2024-02-05 PROCEDURE — 85025 COMPLETE CBC W/AUTO DIFF WBC: CPT

## 2024-02-05 PROCEDURE — 82550 ASSAY OF CK (CPK): CPT

## 2024-02-05 PROCEDURE — 2580000003 HC RX 258

## 2024-02-05 PROCEDURE — 99221 1ST HOSP IP/OBS SF/LOW 40: CPT | Performed by: FAMILY MEDICINE

## 2024-02-05 PROCEDURE — 80053 COMPREHEN METABOLIC PANEL: CPT

## 2024-02-05 RX ORDER — TRIAMCINOLONE ACETONIDE 1 MG/G
CREAM TOPICAL
Qty: 45 G | Refills: 0 | Status: SHIPPED | OUTPATIENT
Start: 2024-02-05

## 2024-02-05 RX ORDER — TRIAMCINOLONE ACETONIDE 1 MG/G
CREAM TOPICAL 2 TIMES DAILY
Status: DISCONTINUED | OUTPATIENT
Start: 2024-02-05 | End: 2024-02-05 | Stop reason: HOSPADM

## 2024-02-05 RX ADMIN — ROSUVASTATIN CALCIUM 5 MG: 5 TABLET, FILM COATED ORAL at 08:48

## 2024-02-05 RX ADMIN — SODIUM CHLORIDE, PRESERVATIVE FREE 10 ML: 5 INJECTION INTRAVENOUS at 08:58

## 2024-02-05 RX ADMIN — ENOXAPARIN SODIUM 30 MG: 100 INJECTION SUBCUTANEOUS at 08:48

## 2024-02-05 RX ADMIN — PETROLATUM: 420 OINTMENT TOPICAL at 09:26

## 2024-02-05 RX ADMIN — TRIAMCINOLONE ACETONIDE: 1 CREAM TOPICAL at 13:21

## 2024-02-05 ASSESSMENT — ENCOUNTER SYMPTOMS
VOMITING: 0
ABDOMINAL PAIN: 0
NAUSEA: 0
CHEST TIGHTNESS: 0
CONSTIPATION: 0
COUGH: 0
SORE THROAT: 0
SHORTNESS OF BREATH: 0
RHINORRHEA: 0
DIARRHEA: 0

## 2024-02-05 NOTE — PLAN OF CARE
Problem: Safety - Adult  Goal: Free from fall injury  2/5/2024 0652 by Flori Callahan, RN  Outcome: Progressing  2/4/2024 1757 by Linda Mcrae RN  Outcome: Progressing     Problem: ABCDS Injury Assessment  Goal: Absence of physical injury  2/5/2024 0652 by Flori Callahan, RN  Outcome: Progressing  2/4/2024 1757 by Linda Mcrae, RN  Outcome: Progressing

## 2024-02-05 NOTE — FLOWSHEET NOTE
02/04/24 2045   Vital Signs   BP Location Left upper arm   BP Method Automatic   Patient Position Supine   Orthostatic B/P and Pulse? Yes   Blood Pressure Lying 98/47   Pulse Lying 50 PER MINUTE   Blood Pressure Sitting 124/72   Pulse Sitting 62 PER MINUTE   Blood Pressure Standing 115/57   Pulse Standing 57 PER MINUTE

## 2024-02-05 NOTE — PROGRESS NOTES
[facility]  Progress Note    Chief complaint :  Chief Complaint   Patient presents with    Arm Pain     Right arm, states that she ran into a pole x2 weeks ago, worse with movement, - fall, - head injury, - cp or sob       Subjective:    No overnight problems. Patient describes feeling better. Patient denies chest pain, SOB, nausea, vomiting, fever, chills, changes in urination, changes in BM. Patient is tolerating diet.    Mrs. Ahuja was seen this morning awake in bed. She states she \"is perfect, that's why she is in the hospital.\" She denies any symptoms and \"just wants a smoke and a coke.\" Her arm pain is improved. She understands plan for placement to rehab.     Past medical, surgical, family and social history were reviewed, non-contributory, and unchanged unless otherwise stated.    Review of Systems   Constitutional:  Negative for chills, fatigue and fever.   HENT:  Negative for congestion, rhinorrhea and sore throat.    Respiratory:  Negative for cough, chest tightness and shortness of breath.    Cardiovascular:  Negative for chest pain and palpitations.   Gastrointestinal:  Negative for abdominal pain, constipation, diarrhea, nausea and vomiting.   Genitourinary:  Negative for dysuria and frequency.   Musculoskeletal:         Right arm pain- improving   Neurological:  Positive for weakness. Negative for dizziness and light-headedness.   All other systems reviewed and are negative.      Objective:  /78   Pulse 50   Temp 98 °F (36.7 °C) (Oral)   Resp 16   Ht 1.549 m (5' 1\")   Wt 46 kg (101 lb 8 oz)   SpO2 98%   BMI 19.18 kg/m²     Physical Exam  Constitutional:       General: She is not in acute distress.     Appearance: Normal appearance.   HENT:      Head: Normocephalic and atraumatic.      Mouth/Throat:      Mouth: Mucous membranes are moist.      Pharynx: Oropharynx is clear.   Eyes:      Extraocular Movements: Extraocular movements intact.      
Patient has a pink raised rash on her left outer thigh. MD & charge notified. Electronically signed by Adriana Bob RN on 2/5/2024 at 9:49 AM    
Physical Therapy  Facility/Department: [address] MED SURG  Physical Therapy Initial Assessment    Name: Virginia Ahuja  : 1935  MRN: [id number]  Date of Service: 2024    Attending Provider:  Kassi Mckeon MD    Evaluating PT:  Elton Acuna Jr., P.T.    Room #:  0506/[facility]6-A  Diagnosis:  Generalized weakness [R53.1]  Pertinent PMHx/PSHx:  about 2 weeks ago pt had LOB while in the basement and hit her R shoulder into a pole which was causing her R shoulder pain.  Precautions:  falls, bed/chair alarm    SUBJECTIVE:    Pt lives with her daughter in a 1 story home with 4 stairs and 1 rail to enter.  There are 12 steps and 1 rail to the basement where her bed and bath are located.  Pt ambulated with no AD PTA.    OBJECTIVE:   Initial Evaluation  Date: 24 Treatment Short Term/ Long Term   Goals   Was pt agreeable to Eval/treatment? yes     Does pt have pain? No c/o pain     Bed Mobility  Rolling: Independent  Supine to sit: Independent  Sit to supine: Independent  Scooting: Independent  Independent   Transfers Sit to stand: supervision  Stand to sit: supervision  Stand pivot: SBA  Independent    Ambulation   300 feet with no AD SBA  350 feet with no AD Independent    Stair negotiation: ascended and descended 8 steps with 1 rail SBA  12 steps with 1 rail Independent    AM-PAC 6 Clicks        BLE ROM is WFL.   BLE strength is grossly 4/5 to 4+/5.   Sensation:  Pt denies numbness and tingling to extremities  Balance: sitting is Independent and standing with no AD is supervision  Endurance: fair+    ASSESSMENT:    Conditions Requiring Skilled Therapeutic Intervention:    [x]Decreased strength     []Decreased ROM  [x]Decreased functional mobility  []Decreased balance   [x]Decreased endurance   []Decreased posture  []Decreased sensation  []Decreased coordination   []Decreased vision  []Decreased safety awareness   []Increased pain       Comments:  Pt was found in bed and was agreeable to PT. She walked 
The potassium/magnesium replacement panel for this patient has been discontinued. It is contraindicated for patients with a CrCl < 30mL/min. This patient's CrCl = 26mL/min. Please order potassium/magnesium only as needed.   Thank you    
facilitate/challenge dynamic balance, stand tolerance for increased safety and independence with ADLs  * Positioning to improve skin integrity, interaction with environment and functional independence    Recommended Adaptive Equipment: TBD      Home Living: Lives with daughter, single family home, 1 story, 4 BRAIN. Bedroom and bathroom located in basement; flight of stairs with rail.   Bathroom set-up: Walk-in shower         Equipment owned: None     Prior Level of Function: Independent with ADLs , daughter assists with IADLs; ambulated independently without AD. Daughter works therefore patient home, alone, at times. Patient states she always makes sure her daughter in home before getting into the shower.     Pain Level: No c/o pain      Cognition: A&O: 4/4; Follows 3 step directions   Memory: fair    Sequencing: intact   Problem solving: fair +   Judgement/safety: fair +     Functional Assessment: AM-PAC Daily Activity Raw Score: 18/24   Initial Eval Status  Date: 2/5/24   Treatment Status  Date:  STGs = LTGs  Time frame: 10-14 days   Feeding Supervision     Independent    Grooming Stand by Assist     Independent   UB Dressing Stand by Assist    Independent    LB Dressing Minimal Assist     Independent    Bathing Minimal Assist     Independent    Toileting Stand by Assist     Independent    Bed Mobility  Supine to sit:  N/A  Sit to supine:  N/A    Supine to sit: Independent   Sit to supine: Independent    Functional Transfers Sit to stand: Supervision   Stand to sit: Supervision     Independent    Functional Mobility Stand by Assist without AD greater than household distances.     Independent    Balance Sitting:     Static: good    Dynamic: good  Standing: fair plus     Sitting:     Static: good    Dynamic: good  Standing: good    Activity Tolerance fair  plus / good  Increase standing tolerance >5 minutes for improved engagement with functional transfers and indep in ADLs     Visual/  Perceptual WFL      NA

## 2024-02-05 NOTE — CONSULTS
Comprehensive Nutrition Assessment    Type and Reason for Visit:  Initial, Consult, Positive Nutrition Screen    Nutrition Recommendations/Plan:   Continue current diet and ONS, as tolerated. Will follow up with pt after she has tried frozen ONS and 4 oz ONS. Pt has declined Boost/Ensure in the past.    Continue inpatient monitoring     Malnutrition Assessment:  Malnutrition Status:  Severe malnutrition (02/05/24 1156)    Context:  Chronic Illness     Findings of the 6 clinical characteristics of malnutrition:  Energy Intake:  75% or less estimated energy requirements for 1 month or longer  Weight Loss:  Greater than 20% over 1 year     Body Fat Loss:  Mild body fat loss     Muscle Mass Loss:  Unable to assess (expected losses with advanced age)    Fluid Accumulation:  No significant fluid accumulation     Strength:  Not Performed    Nutrition Assessment:    Pt admit 2/2 weakness and falls at home. PMH includes CKD 3, COPD, current smoker, hyperglycemia. Subjective wt loss 70# in last year and 10# in last 2 mo. Although she has not lost 70#, she has lost >20% over the last year and meets criteria for malnutrition. Note that her PCP has been tracking loss and pt declined interventions of home delivered meals and ONS. Will trial ONS with all meals while here and monitor pt tolerance    Nutrition Related Findings:    A&Ox4, missing teeth/dentures, no edema, I/O WNL, +BS Wound Type: None (boggy heels, reddened buttocks)       Current Nutrition Intake & Therapies:    Average Meal Intake: 1-25% (PTA but % x 1 since admit documented)  Average Supplements Intake: None Ordered  ADULT DIET; Regular  ADULT ORAL NUTRITION SUPPLEMENT; Lunch, Dinner; Frozen Oral Supplement  ADULT ORAL NUTRITION SUPPLEMENT; Breakfast; Standard 4 oz Oral Supplement    Anthropometric Measures:  Height: 154.9 cm (5' 1\")  Ideal Body Weight (IBW): 105 lbs (48 kg)    Admission Body Weight: 46 kg (101 lb 6.6 oz) (2/4 bedscale)  Current Body

## 2024-02-05 NOTE — CARE COORDINATION
Introduced my self and provided explanation of CM role to patient. Patient is awake, alert, and aware of current diagnosis and discharge planning. Patient is from home with her daughter. Patient states she has been falling at home from \"bumping into things.\" Patient states her daughter would like her to go to rehab. PT laci 20/24. Educated the patient that scores are too high for insurance to pay for rehab. Patient states she does not have any DME. Patient states she would like a walker, no preference of DME companies. Clark with [facility] notified. Spoke to the patient about HHC, she states to speak with her daughter. Called daughter Martina, she was educated on rehab placement vs HHC. Martina states she does not wish to have HHC. The discharge plan is to be discharged home with no needs. Daughter educated if she changes her mind to follow up with PCP Dr. Chin. Nursing notified of plan.   Electronically signed by Tiffani Cardozo RN on 2/5/2024 at 10:58 AM

## 2024-02-05 NOTE — PATIENT CARE CONFERENCE
P Quality Flow/Interdisciplinary Rounds Progress Note        Quality Flow Rounds held on February 5, 2024    Disciplines Attending:  Bedside Nurse, , , and Nursing Unit Leadership    Virginia Ahuja was admitted on 2/4/2024 10:47 AM    Anticipated Discharge Date:       Disposition:    Danny Score:  Danny Scale Score: 20    Readmission Risk              Risk of Unplanned Readmission:  12           Discussed patient goal for the day, patient clinical progression, and barriers to discharge.  The following Goal(s) of the Day/Commitment(s) have been identified:  PT/OT    Diane Quezada RN  February 5, 2024

## 2024-02-05 NOTE — PLAN OF CARE
Problem: Safety - Adult  Goal: Free from fall injury  2/5/2024 1112 by Adriana Bob RN  Outcome: Progressing  2/5/2024 0652 by Flori Callahan RN  Outcome: Progressing     Problem: ABCDS Injury Assessment  Goal: Absence of physical injury  2/5/2024 1112 by Adriana Bob RN  Outcome: Progressing  2/5/2024 0652 by Flori Callahan, RN  Outcome: Progressing

## 2024-02-05 NOTE — DISCHARGE SUMMARY
Discharge Summary    Date:2/5/2024  Patient Name: Virginia Ahuja     YOB: 1935     Age: 88 y.o. MRN: [id number]    Admit Date:2/4/2024    Discharge Date: 02/05/24  Discharged Condition: stable    Discharge Diagnoses   Principal Problem (Resolved):    Generalized weakness  Active Problems:    COPD (chronic obstructive pulmonary disease) ([facility])    Fatigue    Anxiety    Primary insomnia    CKD (chronic kidney disease) stage 3, GFR 30-59 ml/min ([facility])    Severe protein-calorie malnutrition (HCC)      Hospital Stay   Narrative of Hospital Course:  Virginia Ahuja was admitted on 2/4/2024 with a pertinent PMHx of  CKD stage III, anxiety, COPD, hyperlipidemia who presented to the ER from home accompanied by daughter c/o right arm pain. Xray of the right humerus and shoulder showed no osseus abnormality. She was admitted to placement. However, PT/OT scores were too high for rehab placement. Patient and daughter were not agreeable to home health care. She was given a script for outpatient PT. She was discharged in stable condition and told to follow up with PCP.     Consultants:   IP CONSULT TO FAMILY MEDICINE  IP CONSULT TO SOCIAL WORK  IP CONSULT TO SOCIAL WORK  IP CONSULT TO SOCIAL WORK  IP CONSULT TO DIETITIAN  IP CONSULT TO DIETITIAN  IP CONSULT TO IV TEAM    Surgeries/Procedures Performed:   none    Treatments:   Tylenol     Significant Diagnostic Studies:   Recent Labs:  CBC:   Lab Results   Component Value Date/Time    WBC 5.2 02/05/2024 05:25 AM    RBC 3.47 02/05/2024 05:25 AM    HGB 11.4 02/05/2024 05:25 AM    HCT 36.2 02/05/2024 05:25 AM    .3 02/05/2024 05:25 AM    MCH 32.9 02/05/2024 05:25 AM    MCHC 31.5 02/05/2024 05:25 AM    RDW 13.2 02/05/2024 05:25 AM     05/15/2023 10:05 AM     CMP:    Lab Results   Component Value Date/Time    GLUCOSE 71 02/05/2024 05:25 AM     02/05/2024 05:25 AM    K 3.9 02/05/2024 05:25 AM     02/05/2024 05:25 AM    CO2 26 02/05/2024 05:25 AM

## 2024-02-05 NOTE — ACP (ADVANCE CARE PLANNING)
Advance Care Planning   Healthcare Decision Maker:    Primary Decision Maker: Martina Wise - Child - [phone]    Secondary Decision Maker: SeymourJuanjose - Child - [phone]    Click here to complete Healthcare Decision Makers including selection of the Healthcare Decision Maker Relationship (ie \"Primary\").  Today we documented Decision Maker(s) consistent with Legal Next of Kin hierarchy.

## 2024-02-06 ENCOUNTER — CARE COORDINATION (OUTPATIENT)
Dept: CARE COORDINATION | Age: 89
End: 2024-02-06

## 2024-02-06 NOTE — RESULT ENCOUNTER NOTE
Please let Mrs. Ahuja know that the vitamin levels we checked while she was in the hospital (B12, Folate) were normal

## 2024-02-06 NOTE — CARE COORDINATION
Care Transitions Initial Follow Up Call    Call within 2 business days of discharge: Yes    Care Transition Nurse attempted initial CTN outreach leaving Hipaa VM, purpose of call, my contact, and appt reminder.      Patient: Virginia Ahuja Patient : 1935   MRN: <[id number]>  Reason for Admission: 2024 - 2024 [facility]. RUE pain, Home falls, Generalized weakness.   Discharge Date: 24 RARS: Readmission Risk Score: 11.9  NR  CT    Hosp FU/[facility]  3:40 (appt for  10:10 rescheduled)    START taking:  triamcinolone (KENALOG)    Last Discharge Facility       Date Complaint Diagnosis Description Type Department Provider    24 Arm Pain Unable to care for self ... ED to Hosp-Admission (Discharged) (ADMITTED) Kassi Polanco MD; Kevin Gamez...          Catie Dee RN

## 2024-02-07 ENCOUNTER — CARE COORDINATION (OUTPATIENT)
Dept: CARE COORDINATION | Age: 89
End: 2024-02-07

## 2024-02-07 NOTE — CARE COORDINATION
Care Transitions Initial Follow Up Call    Call within 2 business days of discharge: Yes    Care Transition Nurse attempted second/final initial Ct outreach leaving Hipaa VM, purpose of call, my contact, and appt reminder. CTN s/o.    Patient: Virginia Ahuja Patient : 1935   MRN: [id number]  Reason for Admission:  2024 - 2024 [facility]. RUE pain, Home falls, Generalized weakness.   Discharge Date: 24 RARS: Readmission Risk Score: 11.9  NR  CT     Hosp FU/[facility]  3:40 (appt for  10:10 rescheduled)     START taking:  triamcinolone (KENALOG)    Last Discharge Facility       Date Complaint Diagnosis Description Type Department Provider    24 Arm Pain Unable to care for self ... ED to Hosp-Admission (Discharged) (ADMITTED) Kassi Polanco MD; Kevin Gamez...            Catie Dee RN

## 2024-02-14 ENCOUNTER — OFFICE VISIT (OUTPATIENT)
Dept: FAMILY MEDICINE CLINIC | Age: 89
End: 2024-02-14

## 2024-02-14 VITALS
HEART RATE: 56 BPM | HEIGHT: 61 IN | RESPIRATION RATE: 17 BRPM | BODY MASS INDEX: 17.69 KG/M2 | DIASTOLIC BLOOD PRESSURE: 76 MMHG | WEIGHT: 93.7 LBS | TEMPERATURE: 97.2 F | OXYGEN SATURATION: 96 % | SYSTOLIC BLOOD PRESSURE: 134 MMHG

## 2024-02-14 DIAGNOSIS — Z09 HOSPITAL DISCHARGE FOLLOW-UP: Primary | ICD-10-CM

## 2024-02-14 DIAGNOSIS — F13.20 SEDATIVE, HYPNOTIC OR ANXIOLYTIC DEPENDENCE, UNCOMPLICATED (HCC): ICD-10-CM

## 2024-02-14 DIAGNOSIS — N18.32 STAGE 3B CHRONIC KIDNEY DISEASE (HCC): ICD-10-CM

## 2024-02-14 RX ORDER — FUROSEMIDE 20 MG/1
TABLET ORAL
Qty: 45 TABLET | Refills: 1
Start: 2024-02-14

## 2024-02-14 NOTE — PROGRESS NOTES
Hospital follow-up.  Declined PT  Doing better now  Examination  Blood pressure 134/76, pulse 56, temperature 97.2 °F (36.2 °C), temperature source Temporal, resp. rate 17, height 1.549 m (5' 0.98\"), weight 42.5 kg (93 lb 11.1 oz), SpO2 96 %, not currently breastfeeding.   Stable exam  A/P  Observe  Attending Physician Statement  I have discussed the case, including pertinent history and exam findings with the resident. I agree with the documented assessment and plan.

## 2024-02-14 NOTE — PROGRESS NOTES
Post-Discharge Transitional Care  Follow Up      Virginia Ahuja   YOB: 1935    Date of Office Visit:  2/14/2024  Date of Hospital Admission: 2/4/24  Date of Hospital Discharge: 2/5/24  Risk of hospital readmission (high >=14%. Medium >=10%) :Readmission Risk Score: 11.9      Care management risk score Rising risk (score 2-5) and Complex Care (Scores >=6): No Risk Score On File     Non face to face  following discharge, date last encounter closed (first attempt may have been earlier): 02/07/2024    Call initiated 2 business days of discharge: Yes    ASSESSMENT/PLAN:   Hospital discharge follow-up  -     IN DISCHARGE MEDS RECONCILED W/ CURRENT OUTPATIENT MED LIST  Sedative, hypnotic or anxiolytic dependence, uncomplicated (HCC)  Stage 3b chronic kidney disease ([facility])    Medical Decision Making: moderate complexity  No follow-ups on file.  Follow up scheduled 3/5/24         Subjective:   HPI:  Follow up of Hospital problems/diagnosis(es): generalized weakness    Inpatient course: Discharge summary reviewed- see chart. Admitted 2/4/24 with right arm pain, admitted for placement for weakness but scored too well on PT/OT for placement, declined home health care, ordered home PT    Interval history/Current status: right arm feels better, patient reports she was at home when she lost her balance and fell, denies tripping on anything or dizziness, vision changes, or lightheadedness; doesn't want PT, doesn't use walker; discussed using wearable alert button while at home and daughter at work in case of repeat fall, patient reports she has at home and doesn't use, patient denies current need for further PT services, discussed at length use of benzodiazepines increasing fall risk in elderly, patient insists on continuing benzodiazepine therapy    Anemia on recent labs Hgb stable, Cr stable    Patient Active Problem List   Diagnosis    Moderate aortic stenosis    COPD (chronic obstructive pulmonary disease) ([facility])

## 2024-02-22 ENCOUNTER — TELEPHONE (OUTPATIENT)
Dept: CARDIOLOGY | Age: 89
End: 2024-02-22

## 2024-02-22 NOTE — TELEPHONE ENCOUNTER
Spoke with patient to schedule echo. She does not want to schedule. Advised her if she changes her mind to call our office to schedule.  Electronically signed by Kandace Ordaz on 2/22/2024 at 2:40 PM

## 2024-03-01 DIAGNOSIS — Z76.0 MEDICATION REFILL: ICD-10-CM

## 2024-03-01 NOTE — TELEPHONE ENCOUNTER
Refill request    Alprazolam 0.5mg    IVANNA [address] Dr    Last Appointment   2/14/2024  Next Appointment  3/5/2024

## 2024-03-03 DIAGNOSIS — Z76.0 MEDICATION REFILL: ICD-10-CM

## 2024-03-04 RX ORDER — ALPRAZOLAM 0.5 MG/1
TABLET ORAL
Qty: 30 TABLET | Refills: 0 | OUTPATIENT
Start: 2024-03-04

## 2024-03-04 RX ORDER — ALPRAZOLAM 0.5 MG/1
0.5 TABLET ORAL NIGHTLY PRN
Qty: 30 TABLET | Refills: 0 | Status: SHIPPED | OUTPATIENT
Start: 2024-03-04 | End: 2024-05-03

## 2024-03-04 NOTE — TELEPHONE ENCOUNTER
PDMP reviewed, refills sent, patient appt 3/5/24.    Leti Chin DO  Family Medicine Resident, PGY-2  [facility]  3/4/2024 11:14 AM

## 2024-03-05 ENCOUNTER — OFFICE VISIT (OUTPATIENT)
Dept: FAMILY MEDICINE CLINIC | Age: 89
End: 2024-03-05
Payer: COMMERCIAL

## 2024-03-05 VITALS
RESPIRATION RATE: 16 BRPM | BODY MASS INDEX: 17.9 KG/M2 | TEMPERATURE: 97.4 F | DIASTOLIC BLOOD PRESSURE: 64 MMHG | OXYGEN SATURATION: 95 % | HEIGHT: 61 IN | WEIGHT: 94.8 LBS | HEART RATE: 57 BPM | SYSTOLIC BLOOD PRESSURE: 120 MMHG

## 2024-03-05 DIAGNOSIS — Z74.1 REQUIRES ASSISTANCE WITH ACTIVITIES OF DAILY LIVING (ADL): Primary | ICD-10-CM

## 2024-03-05 DIAGNOSIS — Z76.0 MEDICATION REFILL: ICD-10-CM

## 2024-03-05 PROCEDURE — 99213 OFFICE O/P EST LOW 20 MIN: CPT

## 2024-03-05 PROCEDURE — 1123F ACP DISCUSS/DSCN MKR DOCD: CPT

## 2024-03-05 NOTE — PROGRESS NOTES
[facility]  Department of Family Medicine  Family Medicine Residency Program      Patient: Virginia Ahuaj 88 y.o. female   Chief complaint:   Chief Complaint   Patient presents with    Anxiety       HISTORY OF PRESENTING ILLNESS     88 y.o. female PMH CKD 3a, AS, COPD, HTN, HLD, insomnia, BASILIA presented to the clinic for medication refills.    Anxiety, insomnia- alprazolam 0.5 mg nightly prn, refills ordered, pdmp reviewed and appropriate    Caregiver burden- daughter frustrated, mom refusing respite care, meals on wheels, living with daughter and daughter doesn't want in-home care, wants mother to go to assisted living, patient agreeable to consider this, appointment Thursday to tour facility; patient requiring help with cooking meals, showering, medications with two instances of concern for safety regarding patient leaving stovetop on and taking excess medication than prescribed (rosuvastatin and lasix x2)    Health Maintenance:  Health Maintenance Due   Topic Date Due    Shingles vaccine (1 of 2) Never done    Respiratory Syncytial Virus (RSV) Pregnant or age 60 yrs+ (1 - 1-dose 60+ series) Never done    COVID-19 Vaccine (4 - 2023-24 season) 09/01/2023    Annual Wellness Visit (Medicare Advantage)  01/01/2024     Past Medical History:      Diagnosis Date    Aortic stenosis     COPD (chronic obstructive pulmonary disease) (HCC)     Disorder of bone     Elevated hemoglobin A1c     Fatigue     Hyperlipidemia     Hypertension     Open wound of right forearm 2/8/2023    Skin only    Smoker     Traumatic ulcer of lower leg, right, with fat layer exposed (HCC) 3/28/2018    Wound of right leg      Past Surgical History:    No past surgical history on file.  Allergies:    Patient has no known allergies.  Social History:   Social History     Socioeconomic History    Marital status:      Spouse name: Not on file    Number of children: Not on file    Years of education: Not on file    Highest

## 2024-03-05 NOTE — PROGRESS NOTES
S: 88 y.o. female with   Chief Complaint   Patient presents with    Anxiety       BASILIA on xanax without side effects.  Not ready to decrease this.  She does have increased falls but is not associating this with the meds.  It is unclear at this time if it is related. Xanax refilled yesterday.      O: VS:  height is 1.549 m (5' 0.98\") and weight is 43 kg (94 lb 12.8 oz). Her temporal temperature is 97.4 °F (36.3 °C). Her blood pressure is 120/64 and her pulse is 57. Her respiration is 16 and oxygen saturation is 95%.   BP Readings from Last 3 Encounters:   03/05/24 120/64   02/14/24 134/76   02/05/24 112/78     See resident note      Impression/Plan:   1) BASILIA - stable on xanax which is high risk pill for her. Family is aware.  Will give up to 2 more refills of xanax.           Health Maintenance Due   Topic Date Due    Shingles vaccine (1 of 2) Never done    Respiratory Syncytial Virus (RSV) Pregnant or age 60 yrs+ (1 - 1-dose 60+ series) Never done    COVID-19 Vaccine (4 - 2023-24 season) 09/01/2023    Annual Wellness Visit (Medicare Advantage)  01/01/2024         Attending Physician Statement  I have discussed the case, including pertinent history and exam findings with the resident.  I agree with the documented assessment and plan.      Brodie Prado MD

## 2024-03-06 ENCOUNTER — CARE COORDINATION (OUTPATIENT)
Dept: CARE COORDINATION | Age: 89
End: 2024-03-06

## 2024-03-06 NOTE — CARE COORDINATION
Initial Contact Social Work Note - Ambulatory  3/6/2024      Date of referral: 3/6/2024  Referral received from: PCP Dr. Chin  Reason for referral: [facility] resources    Previous SW referral: Yes  If yes, brief summary of outcome: SWCC provided daughter with private duty aide service resource    Two Identifiers Verified: Yes    Insurance Provider: [facility]    Support System:  Adult Child/Children     Status:  No    Community Providers:  No    ADL Assistance Needed:  medication management, need prompts to shower, get dressed , eat, needs someone to cook    Housing/Living Concerns or Home Modification Needs: Pt's dtr Martina is talking with [facility]    Transportation Concern: no, dtr transports pt    Medication Cost Concern: No    Medication Adherence Concern: dtr manages medications    Financial Concern(s): No    Income (only if applicable): pt receives Social Security    Ability to Read/Write: Yes, limited    Advance Care Plan:  Reviewed and confirmed accuracy    Other: N/A    Identified Needs:  [facility] placement    Social Work Plan:  Daughter is working with [facility] for [facility] placement for pt    Next Steps: SWCC to sign off    Method of Communication With Provider (if appropriate): Chart Routing       Goals Addressed    None        SWCC made call to pt's dtr Martina, completed SW assessment. Martina is working with [facility] for [facility] placement for pt. They have already toured the facility and pt is scheduled for an evaluation. Family has been informed it will be private pay. Pt is independent of some ADLs, but needs assistance with medication management, cooking, etc. Martina did not have any SW questions or needs. SWCC to close out on referral but encouraged to call this SWCC with any SW needs that may arise. SWCC to route note to PCP.    Ivon Leung MSW, LSW   Care Coordinator  [phone]

## 2024-03-13 ENCOUNTER — HOSPITAL ENCOUNTER (EMERGENCY)
Age: 89
Discharge: HOME | End: 2024-03-13
Attending: EMERGENCY MEDICINE
Payer: COMMERCIAL

## 2024-03-13 ENCOUNTER — APPOINTMENT (OUTPATIENT)
Dept: GENERAL RADIOLOGY | Age: 89
End: 2024-03-13
Payer: COMMERCIAL

## 2024-03-13 VITALS
WEIGHT: 94 LBS | OXYGEN SATURATION: 100 % | BODY MASS INDEX: 17.75 KG/M2 | HEART RATE: 58 BPM | SYSTOLIC BLOOD PRESSURE: 110 MMHG | TEMPERATURE: 97.9 F | DIASTOLIC BLOOD PRESSURE: 42 MMHG | RESPIRATION RATE: 16 BRPM | HEIGHT: 61 IN
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
## 2024-03-13 DIAGNOSIS — T07.XXXA LACERATION OF MULTIPLE SITES OF SKIN: Primary | ICD-10-CM

## 2024-03-13 PROCEDURE — 73562 X-RAY EXAM OF KNEE 3: CPT

## 2024-03-13 PROCEDURE — 73070 X-RAY EXAM OF ELBOW: CPT

## 2024-03-13 PROCEDURE — 99283 EMERGENCY DEPT VISIT LOW MDM: CPT

## 2024-03-13 PROCEDURE — 73502 X-RAY EXAM HIP UNI 2-3 VIEWS: CPT

## 2024-03-13 PROCEDURE — 6370000000 HC RX 637 (ALT 250 FOR IP): Performed by: EMERGENCY MEDICINE

## 2024-03-13 RX ORDER — ACETAMINOPHEN 325 MG/1
650 TABLET ORAL ONCE
Status: COMPLETED | OUTPATIENT
Start: 2024-03-13 | End: 2024-03-13

## 2024-03-13 RX ADMIN — ACETAMINOPHEN 650 MG: 325 TABLET ORAL at 07:52

## 2024-03-13 ASSESSMENT — PAIN DESCRIPTION - ORIENTATION: ORIENTATION: RIGHT

## 2024-03-13 ASSESSMENT — PAIN SCALES - GENERAL
PAINLEVEL_OUTOF10: 7
PAINLEVEL_OUTOF10: 4

## 2024-03-13 ASSESSMENT — PAIN DESCRIPTION - LOCATION: LOCATION: ARM;HIP;LEG

## 2024-03-13 ASSESSMENT — PAIN - FUNCTIONAL ASSESSMENT: PAIN_FUNCTIONAL_ASSESSMENT: 0-10

## 2024-03-13 NOTE — ED PROVIDER NOTES
HPI:  3/13/24, Time: 7:43 AM EDT         Virginia Ahuja is a 88 y.o. female presenting to the ED for mechanical fall on the floor when she was getting ready to walk up the stairs.  Complaining of right hip right leg right elbow pain.  Patient suffered multiple skin tears.  She does suffer from COPD aortic stenosis hyperlipidemia hypertension protein malnutrition stage IIIb chronic kidney disease., beginning several hours ago.  The complaint has been persistent, moderate in severity, and worsened by standing.  Patient no loss of consciousness strike her head.  She denies any back pain chest pain or shortness of breath.  Tetanus status is up-to-date.    Review of Systems:   A complete review of systems was performed and pertinent positives and negatives are stated within HPI, all other systems reviewed and are negative.    --------------------------------------------- PAST HISTORY ---------------------------------------------  Past Medical History:  has a past medical history of Aortic stenosis, COPD (chronic obstructive pulmonary disease) (HCC), Disorder of bone, Elevated hemoglobin A1c, Fatigue, Hyperlipidemia, Hypertension, Open wound of right forearm, Smoker, Traumatic ulcer of lower leg, right, with fat layer exposed (HCC), and Wound of right leg.    Past Surgical History:  has no past surgical history on file.    Social History:  reports that she has been smoking cigarettes. She has never used smokeless tobacco. She reports that she does not drink alcohol and does not use drugs.    Family History: family history includes No Known Problems in her brother and mother; Stroke in her father.     The patient’s home medications have been reviewed.    Allergies: Patient has no known allergies.    -------------------------------------------------- RESULTS -------------------------------------------------  All laboratory and radiology results have been personally reviewed by myself   LABS:  No results found for this

## 2024-03-16 ENCOUNTER — APPOINTMENT (OUTPATIENT)
Dept: GENERAL RADIOLOGY | Age: 89
End: 2024-03-16
Payer: COMMERCIAL

## 2024-03-16 ENCOUNTER — APPOINTMENT (OUTPATIENT)
Dept: ULTRASOUND IMAGING | Age: 89
End: 2024-03-16
Payer: COMMERCIAL

## 2024-03-16 ENCOUNTER — HOSPITAL ENCOUNTER (EMERGENCY)
Age: 89
Discharge: HOME | End: 2024-03-16
Payer: COMMERCIAL

## 2024-03-16 VITALS
DIASTOLIC BLOOD PRESSURE: 53 MMHG | HEIGHT: 61 IN | TEMPERATURE: 97.5 F | RESPIRATION RATE: 16 BRPM | HEART RATE: 64 BPM | WEIGHT: 94 LBS | OXYGEN SATURATION: 100 % | SYSTOLIC BLOOD PRESSURE: 119 MMHG | BODY MASS INDEX: 17.75 KG/M2

## 2024-03-16 DIAGNOSIS — L03.113 CELLULITIS OF FOREARM, RIGHT: Primary | ICD-10-CM

## 2024-03-16 DIAGNOSIS — M79.89 PAIN AND SWELLING OF FOREARM, RIGHT: ICD-10-CM

## 2024-03-16 DIAGNOSIS — S51.811D LACERATION OF SKIN OF RIGHT FOREARM, SUBSEQUENT ENCOUNTER: ICD-10-CM

## 2024-03-16 DIAGNOSIS — S81.811D SKIN TEAR OF RIGHT LOWER LEG WITHOUT COMPLICATION, SUBSEQUENT ENCOUNTER: ICD-10-CM

## 2024-03-16 DIAGNOSIS — M79.631 PAIN AND SWELLING OF FOREARM, RIGHT: ICD-10-CM

## 2024-03-16 LAB
ANION GAP SERPL CALCULATED.3IONS-SCNC: 12 MMOL/L (ref 7–16)
BASOPHILS # BLD: 0.03 K/UL (ref 0–0.2)
BASOPHILS NFR BLD: 0 % (ref 0–2)
BUN SERPL-MCNC: 27 MG/DL (ref 6–23)
CALCIUM SERPL-MCNC: 9 MG/DL (ref 8.6–10.2)
CHLORIDE SERPL-SCNC: 101 MMOL/L (ref 98–107)
CO2 SERPL-SCNC: 28 MMOL/L (ref 22–29)
CREAT SERPL-MCNC: 1.2 MG/DL (ref 0.5–1)
EOSINOPHIL # BLD: 0 K/UL (ref 0.05–0.5)
EOSINOPHILS RELATIVE PERCENT: 0 % (ref 0–6)
ERYTHROCYTE [DISTWIDTH] IN BLOOD BY AUTOMATED COUNT: 13.2 % (ref 11.5–15)
GFR SERPL CREATININE-BSD FRML MDRD: 43 ML/MIN/1.73M2
GLUCOSE SERPL-MCNC: 119 MG/DL (ref 74–99)
HCT VFR BLD AUTO: 40.7 % (ref 34–48)
HGB BLD-MCNC: 13 G/DL (ref 11.5–15.5)
IMM GRANULOCYTES # BLD AUTO: <0.03 K/UL (ref 0–0.58)
IMM GRANULOCYTES NFR BLD: 0 % (ref 0–5)
LACTATE BLDV-SCNC: 1.8 MMOL/L (ref 0.5–2.2)
LYMPHOCYTES NFR BLD: 1.65 K/UL (ref 1.5–4)
LYMPHOCYTES RELATIVE PERCENT: 22 % (ref 20–42)
MCH RBC QN AUTO: 33.2 PG (ref 26–35)
MCHC RBC AUTO-ENTMCNC: 31.9 G/DL (ref 32–34.5)
MCV RBC AUTO: 104.1 FL (ref 80–99.9)
MONOCYTES NFR BLD: 0.49 K/UL (ref 0.1–0.95)
MONOCYTES NFR BLD: 7 % (ref 2–12)
NEUTROPHILS NFR BLD: 70 % (ref 43–80)
NEUTS SEG NFR BLD: 5.18 K/UL (ref 1.8–7.3)
PLATELET, FLUORESCENCE: 177 K/UL (ref 130–450)
PMV BLD AUTO: 13.1 FL (ref 7–12)
POTASSIUM SERPL-SCNC: 4.8 MMOL/L (ref 3.5–5)
RBC # BLD AUTO: 3.91 M/UL (ref 3.5–5.5)
SODIUM SERPL-SCNC: 141 MMOL/L (ref 132–146)
WBC OTHER # BLD: 7.4 K/UL (ref 4.5–11.5)

## 2024-03-16 PROCEDURE — 73090 X-RAY EXAM OF FOREARM: CPT

## 2024-03-16 PROCEDURE — 80048 BASIC METABOLIC PNL TOTAL CA: CPT

## 2024-03-16 PROCEDURE — 83605 ASSAY OF LACTIC ACID: CPT

## 2024-03-16 PROCEDURE — 73130 X-RAY EXAM OF HAND: CPT

## 2024-03-16 PROCEDURE — 85025 COMPLETE CBC W/AUTO DIFF WBC: CPT

## 2024-03-16 PROCEDURE — 93971 EXTREMITY STUDY: CPT

## 2024-03-16 PROCEDURE — 6370000000 HC RX 637 (ALT 250 FOR IP): Performed by: NURSE PRACTITIONER

## 2024-03-16 PROCEDURE — 99284 EMERGENCY DEPT VISIT MOD MDM: CPT

## 2024-03-16 PROCEDURE — 73110 X-RAY EXAM OF WRIST: CPT

## 2024-03-16 RX ORDER — ACETAMINOPHEN 500 MG
500 TABLET ORAL 4 TIMES DAILY PRN
Qty: 20 TABLET | Refills: 1 | Status: SHIPPED | OUTPATIENT
Start: 2024-03-16

## 2024-03-16 RX ORDER — CEPHALEXIN 500 MG/1
500 CAPSULE ORAL 3 TIMES DAILY
Qty: 30 CAPSULE | Refills: 0 | Status: SHIPPED | OUTPATIENT
Start: 2024-03-16 | End: 2024-03-26

## 2024-03-16 RX ORDER — CEPHALEXIN 500 MG/1
500 CAPSULE ORAL 4 TIMES DAILY
Qty: 40 CAPSULE | Refills: 0 | Status: SHIPPED | OUTPATIENT
Start: 2024-03-16 | End: 2024-03-16

## 2024-03-16 RX ORDER — ACETAMINOPHEN 325 MG/1
650 TABLET ORAL ONCE
Status: COMPLETED | OUTPATIENT
Start: 2024-03-16 | End: 2024-03-16

## 2024-03-16 RX ORDER — CEPHALEXIN 500 MG/1
500 CAPSULE ORAL ONCE
Status: COMPLETED | OUTPATIENT
Start: 2024-03-16 | End: 2024-03-16

## 2024-03-16 RX ORDER — BACITRACIN ZINC 500 [USP'U]/G
OINTMENT TOPICAL ONCE
Status: DISCONTINUED | OUTPATIENT
Start: 2024-03-16 | End: 2024-03-16

## 2024-03-16 RX ORDER — BACITRACIN ZINC AND POLYMYXIN B SULFATE 500; 1000 [USP'U]/G; [USP'U]/G
OINTMENT TOPICAL
Qty: 30 G | Refills: 0 | Status: SHIPPED | OUTPATIENT
Start: 2024-03-16 | End: 2024-03-23

## 2024-03-16 RX ORDER — GINSENG 100 MG
CAPSULE ORAL ONCE
Status: COMPLETED | OUTPATIENT
Start: 2024-03-16 | End: 2024-03-16

## 2024-03-16 RX ADMIN — BACITRACIN: 500 OINTMENT TOPICAL at 13:59

## 2024-03-16 RX ADMIN — CEPHALEXIN 500 MG: 500 CAPSULE ORAL at 15:37

## 2024-03-16 RX ADMIN — ACETAMINOPHEN 650 MG: 325 TABLET ORAL at 14:00

## 2024-03-16 ASSESSMENT — PAIN DESCRIPTION - ORIENTATION: ORIENTATION: RIGHT

## 2024-03-16 ASSESSMENT — PAIN SCALES - GENERAL: PAINLEVEL_OUTOF10: 5

## 2024-03-16 ASSESSMENT — PAIN DESCRIPTION - LOCATION: LOCATION: ARM;KNEE

## 2024-03-16 ASSESSMENT — PAIN - FUNCTIONAL ASSESSMENT: PAIN_FUNCTIONAL_ASSESSMENT: NONE - DENIES PAIN

## 2024-03-16 NOTE — DISCHARGE INSTRUCTIONS
Elevate the hand above the level of the heart as much as possible. Probiotic or yogurt so you don't get diarrhea with antibiotics. If you develop fever, chills, worsening pain, purulent drainage return to the emergency room for re evaluation.

## 2024-03-16 NOTE — ED PROVIDER NOTES
Independent SEGUN Visit.            [facility] EMERGENCY DEPARTMENT  ED  Encounter Note  Admit Date/RoomTime: 3/16/2024  1:24 PM  ED Room: WAITING RESULTS/WAITING *  NAME: Virginia Ahuja  : 1935  MRN: [id number]  PCP: Leti Chin DO    CHIEF COMPLAINT     OTHER (Fell Tuesday morning and was seen in this ED, and released to home. Lacerations of right elbow and lower arm: area is painful and swollen. Also, right lower leg laceration: no new complaints.)    HISTORY OF PRESENT ILLNESS        Virginia Ahuja is a 88 y.o. female who presents to the ED by private vehicle with daughter for increased redness, pain and swelling to right forearm, beginning today prior to arrival.  Patient and her daughter report she had a fall when getting out of bed in the basement approximately 3 days ago was seen in the ED and had negative xray of elbow and had lacerations to her forearm and upper tibia region.  She denies any fever, chills or purulent drainage from wounds.  Patient is a smoker and her heart rate was double counting and is currently 64.  She denies any chest pain, palpitations, dizziness, shortness of breath, pain with elbow movement, wrist pain and does have pain over the mid right forearm region and the fifth finger which has a laceration to it.  The complaint has been persistent and worsening and are moderate in severity.  She is a smoker.  Daughter reports that she always has a slightly unsteady gait and she does not like using her walker.  She denies any new falls.    REVIEW OF SYSTEMS     Pertinent positives and negatives are stated within HPI, all other systems reviewed and are negative.    Past Medical History:  has a past medical history of Aortic stenosis, COPD (chronic obstructive pulmonary disease) (HCC), Disorder of bone, Elevated hemoglobin A1c, Fatigue, Hyperlipidemia, Hypertension, Open wound of right forearm, Smoker, Traumatic ulcer of lower leg, right, with fat    Patient referred to  No follow-up provider specified.  NEW MEDICATIONS     New Prescriptions    ACETAMINOPHEN (TYLENOL) 500 MG TABLET    Take 1 tablet by mouth 4 times daily as needed for Pain    CEPHALEXIN (KEFLEX) 500 MG CAPSULE    Take 1 capsule by mouth 3 times daily for 10 days     Electronically signed by NICOLE John CNP   DD: 3/16/24  **This report was transcribed using voice recognition software. Every effort was made to ensure accuracy; however, inadvertent computerized transcription errors may be present.  END OF ED PROVIDER NOTE       Cecy Cespedes APRN - CNP  03/16/24 2134       Cecy Cespedes APRN - CNP  03/16/24 2134

## 2024-03-19 ENCOUNTER — OFFICE VISIT (OUTPATIENT)
Dept: FAMILY MEDICINE CLINIC | Age: 89
End: 2024-03-19
Payer: COMMERCIAL

## 2024-03-19 VITALS
TEMPERATURE: 97.3 F | SYSTOLIC BLOOD PRESSURE: 120 MMHG | WEIGHT: 97.66 LBS | DIASTOLIC BLOOD PRESSURE: 57 MMHG | HEIGHT: 61 IN | HEART RATE: 59 BPM | RESPIRATION RATE: 16 BRPM | BODY MASS INDEX: 18.44 KG/M2

## 2024-03-19 DIAGNOSIS — S61.411S LACERATION OF RIGHT HAND WITHOUT FOREIGN BODY, SEQUELA: Primary | ICD-10-CM

## 2024-03-19 DIAGNOSIS — S81.801D OPEN WOUND OF RIGHT KNEE, LEG, AND ANKLE WITH COMPLICATION, SUBSEQUENT ENCOUNTER: ICD-10-CM

## 2024-03-19 DIAGNOSIS — S51.801D OPEN WOUND OF RIGHT FOREARM, SUBSEQUENT ENCOUNTER: ICD-10-CM

## 2024-03-19 DIAGNOSIS — S91.001D OPEN WOUND OF RIGHT KNEE, LEG, AND ANKLE WITH COMPLICATION, SUBSEQUENT ENCOUNTER: ICD-10-CM

## 2024-03-19 DIAGNOSIS — S81.001D OPEN WOUND OF RIGHT KNEE, LEG, AND ANKLE WITH COMPLICATION, SUBSEQUENT ENCOUNTER: ICD-10-CM

## 2024-03-19 DIAGNOSIS — E78.2 MIXED HYPERLIPIDEMIA: ICD-10-CM

## 2024-03-19 PROCEDURE — 1123F ACP DISCUSS/DSCN MKR DOCD: CPT

## 2024-03-19 PROCEDURE — 99213 OFFICE O/P EST LOW 20 MIN: CPT

## 2024-03-19 RX ORDER — ROSUVASTATIN CALCIUM 5 MG/1
TABLET, COATED ORAL
Qty: 30 TABLET | Refills: 11 | Status: SHIPPED | OUTPATIENT
Start: 2024-03-19

## 2024-03-19 RX ORDER — FUROSEMIDE 20 MG/1
TABLET ORAL
Qty: 15 TABLET | Refills: 11 | Status: SHIPPED | OUTPATIENT
Start: 2024-03-19

## 2024-03-19 NOTE — PROGRESS NOTES
Subjective:  Virginia Ahuja is a 88 y.o. female with chief complaint of Follow-up (Seen in ED on 3/13 for fall, 3/16 for abrasions and swelling)      HPI:  HPI    ED follow up for cellulitis of forearm  Patient seen in ED 3/16  after reporting falling out of bed a few days prior.  Presented with right forearm pain and swelling.   No WBC, no LA  XR right hand showed no dislocation, some degeneration  XR wrist showed no abnormalities  XR radius/ulna showed soft tissue swelling of mid right forearm  RUE doppler ruled out DVT  Wound care, abx for infection prevention.  Chronic unsteady gait, patient does not like using a walker.     Is at [facility] assisted living due safety concern for inability to care for self      Tuesday fell on concrete floor, mechanical?  Got up too fast  Thursday swelling  Saturday went back to ED  Using assisted device, previously did not like using cane    No new pain, no new       ROS:  Review of Systems   Constitutional:  Negative for chills and fever.   HENT:  Negative for congestion, rhinorrhea and sore throat.    Eyes:  Negative for pain and visual disturbance.   Respiratory:  Negative for shortness of breath and wheezing.    Cardiovascular:  Negative for chest pain and palpitations.   Gastrointestinal:  Negative for diarrhea and nausea.   Genitourinary:  Negative for dysuria and hematuria.   Musculoskeletal:  Negative for arthralgias and myalgias.   Skin:  Positive for wound. Negative for rash.   Neurological:  Positive for weakness. Negative for dizziness and headaches.       Objective:  Vitals:    03/19/24 1446   BP: (!) 120/57   Pulse: 59   Resp: 16   Temp: 97.3 °F (36.3 °C)   TempSrc: Temporal   Weight: 44.3 kg (97 lb 10.6 oz)   Height: 1.549 m (5' 0.98\")     Physical Exam  Vitals and nursing note reviewed.   Constitutional:       General: She is not in acute distress.     Appearance: Normal appearance.   HENT:      Head: Normocephalic and atraumatic.   Eyes:      General:

## 2024-03-19 NOTE — PROGRESS NOTES
S: 88 y.o. female with   Chief Complaint   Patient presents with    Follow-up     Seen in ED on 3/13 for fall, 3/16 for abrasions and swelling       89 yo female here for fu wound eval and Tx. (Wound occurred from fall 1 week ago). Lives at [facility].  Seen in ER on 3-12-24, then returned to ER on 3-16 -24 because daughter saw swelling at site of L pinky as well as proximal to L forearm wound. X-rays done in ER - No fx's. US showed no DVT. Started on polysporin and keflex. Here for FU.     O: VS:  height is 1.549 m (5' 0.98\") and weight is 44.3 kg (97 lb 10.6 oz). Her temporal temperature is 97.3 °F (36.3 °C). Her blood pressure is 120/57 (abnormal) and her pulse is 59. Her respiration is 16.   BP Readings from Last 3 Encounters:   03/19/24 (!) 120/57   03/16/24 (!) 119/53   03/13/24 (!) 110/42     See resident note  Pt states she fell after getting up quickly and feeling light headed - no CP, No limb weakness or giving out, no syncope, no tripping.  Skin tears of R pinky and RLE - all healing well without infection.  Wound of proximal dorsal aspect of R forearm - approximately 4 cm long, healing by secondary intension. Wound looks clean with no evidence of infection.  No surrounding erythema, no purulent DC. No tenderness with palpation of lalito-wound area. There is dependent edema of forearm from wound to middle third of forearm at this time. (Daughter - in room with pt and during exam - notes edema was to dorsal hand at time of presentation to office today - has gone down over past half hour with elevation alone).     Impression/Plan:   1) s/p fall - orthostasis by pt's description.  2) Skin tears of R Pinky finger and R reba-lateral lower leg. All healing well without infection.  3) Laceration of proximal lateral aspect dorsal R forearm. No infection - wound clean and dry - healing by secondary intension. + dependent edema around this wound.      Health Maintenance Due   Topic Date Due

## 2024-03-20 ENCOUNTER — TELEPHONE (OUTPATIENT)
Dept: FAMILY MEDICINE CLINIC | Age: 89
End: 2024-03-20

## 2024-03-20 DIAGNOSIS — Z76.0 MEDICATION REFILL: ICD-10-CM

## 2024-03-20 DIAGNOSIS — H04.123 DRY EYES: Primary | ICD-10-CM

## 2024-03-20 RX ORDER — ALPRAZOLAM 0.5 MG/1
0.5 TABLET ORAL NIGHTLY PRN
Qty: 30 TABLET | Refills: 0 | Status: SHIPPED | OUTPATIENT
Start: 2024-03-20 | End: 2024-04-19

## 2024-03-20 NOTE — TELEPHONE ENCOUNTER
Pharmacy calling needing clarification on SIG for pts Xanax script-    [phone]   Cintia @ pharmacy

## 2024-03-20 NOTE — TELEPHONE ENCOUNTER
Since patient is now residing in a long term care facility, a new prescription is required to be sent to the new pharmacy. They cannot transfer from previous pharmacy

## 2024-03-20 NOTE — TELEPHONE ENCOUNTER
Received paper from [facility] stating pt states that she uses Systane eye drops, do not have anywhere in medications or a mention of it in visit. They asked to advise.

## 2024-03-20 NOTE — TELEPHONE ENCOUNTER
Prescription last picked up on 3/4/2024.  Patient requiring new prescription due to residency at long-term care facility with inability to transfer prescription from pharmacy to pharmacy.  New prescription sent.    Leti Chin DO  Family Medicine Resident, PGY-2  [facility]  3/20/2024 12:58 PM

## 2024-03-21 RX ORDER — PROPYLENE GLYCOL 0.06 MG/ML
2 SOLUTION/ DROPS OPHTHALMIC 2 TIMES DAILY
Qty: 10 ML | Refills: 2 | Status: SHIPPED | OUTPATIENT
Start: 2024-03-21

## 2024-03-21 NOTE — TELEPHONE ENCOUNTER
Spoke with Cintia from pharmacy, changed prescription for Xanax to nightly rather than nightly prn considering patient takes medication strictly nightly. 30-day supply.    Leti Chin DO  Family Medicine Resident, PGY-2  [facility]  3/21/2024 12:11 PM

## 2024-03-21 NOTE — TELEPHONE ENCOUNTER
Maria Guadalupe sent.    Leti Chin DO  Family Medicine Resident, PGY-2  [facility]  3/21/2024 12:08 PM

## 2024-03-26 ENCOUNTER — TELEPHONE (OUTPATIENT)
Dept: FAMILY MEDICINE CLINIC | Age: 89
End: 2024-03-26

## 2024-03-26 NOTE — TELEPHONE ENCOUNTER
Which ankle? I don't see documentation about ankle injury from recent office visit or ED visit. Which fall was it from?    Leti Chin DO  Family Medicine Resident, PGY-2  [facility]  3/26/2024 11:55 AM

## 2024-03-26 NOTE — TELEPHONE ENCOUNTER
Daughter Martina was asking for PT for her moms ankle. They asked for Ramy to do the PT. She is at the [facility] at [facility]. Thank you

## 2024-03-27 NOTE — TELEPHONE ENCOUNTER
Daughter said that it was from the fall about a week ago. It is her right ankle foot area. No pain just swelling.  Thank you

## 2024-03-27 NOTE — TELEPHONE ENCOUNTER
A request for orders re: the right ankle came yesterday addressed to Dr Prado, didn't even realize it was not his patient. Due to the issues of RLE swelling, I gave to Dr Godwin to review since Dr Prado was out. He sent orders for a RLE US to r/o DVT. It was faxed to [facility] [place] (it is scanned and attached to this encounter as well)

## 2024-04-01 ENCOUNTER — FOLLOWUP TELEPHONE ENCOUNTER (OUTPATIENT)
Dept: FAMILY MEDICINE CLINIC | Age: 89
End: 2024-04-01

## 2024-04-01 NOTE — TELEPHONE ENCOUNTER
Please call patient and inform her there was no sign of clot on the recent ultrasound of her leg.     Leti Chin DO  Family Medicine Resident, PGY-2  [facility]  4/1/2024 1:19 PM

## 2024-04-04 ENCOUNTER — TELEPHONE (OUTPATIENT)
Dept: FAMILY MEDICINE CLINIC | Age: 89
End: 2024-04-04

## 2024-04-04 NOTE — TELEPHONE ENCOUNTER
Need new diagnoses for DOS 9/5/23.  On desk to complete.  Please return to me when finished.  Thanks.

## 2024-04-29 DIAGNOSIS — Z76.0 MEDICATION REFILL: ICD-10-CM

## 2024-04-29 RX ORDER — ALPRAZOLAM 0.5 MG/1
0.5 TABLET ORAL NIGHTLY
Qty: 30 TABLET | Refills: 0 | OUTPATIENT
Start: 2024-04-29 | End: 2024-05-29

## 2024-04-29 RX ORDER — ALPRAZOLAM 0.5 MG/1
0.5 TABLET ORAL NIGHTLY
Qty: 30 TABLET | Refills: 0 | Status: SHIPPED | OUTPATIENT
Start: 2024-04-29 | End: 2024-05-29

## 2024-04-29 NOTE — TELEPHONE ENCOUNTER
Received authorization to dispense controlled substance for Virginia Ahuja.  PDMP reviewed, refill appropriate.  Refill sent to institutional services.     Leti Chin DO  Family Medicine Resident, PGY-2  [facility]  4/29/2024 2:44 PM

## 2024-05-20 ENCOUNTER — OFFICE VISIT (OUTPATIENT)
Dept: FAMILY MEDICINE CLINIC | Age: 89
End: 2024-05-20

## 2024-05-20 VITALS
RESPIRATION RATE: 16 BRPM | TEMPERATURE: 97.5 F | SYSTOLIC BLOOD PRESSURE: 102 MMHG | HEIGHT: 61 IN | DIASTOLIC BLOOD PRESSURE: 52 MMHG | HEART RATE: 58 BPM | WEIGHT: 109.57 LBS | OXYGEN SATURATION: 97 % | BODY MASS INDEX: 20.69 KG/M2

## 2024-05-20 DIAGNOSIS — Z76.0 MEDICATION REFILL: ICD-10-CM

## 2024-05-20 DIAGNOSIS — F41.9 ANXIETY: ICD-10-CM

## 2024-05-20 RX ORDER — ALPRAZOLAM 0.5 MG/1
0.5 TABLET ORAL NIGHTLY
Qty: 30 TABLET | Refills: 0 | Status: CANCELLED | OUTPATIENT
Start: 2024-05-20 | End: 2024-06-19

## 2024-05-20 NOTE — PROGRESS NOTES
[facility]  Department of Family Medicine  Family Medicine Residency Program      Patient: Virginia Ahuja 88 y.o. female   Chief complaint:   Chief Complaint   Patient presents with    Hypotension       HISTORY OF PRESENTING ILLNESS     88 y.o. female PMH CKD 3a, AS, COPD, HTN, HLD, insomnia, BASILIA presented to the clinic for medication refill.    Living at [facility], enjoying the activities for resident so far and field trips away from the facility    Anxiety, insomnia- alprazolam 0.5 mg nightly, requesting refills, documented previously extensive conversations with patient regarding morbidity and mortality risks of continuing these medications at her age during which patient verbalizes understanding and continues to want therapy, pdmp reviewed, patient not yet due for refills but when appropriate will be sent to facility    Health Maintenance:  Health Maintenance Due   Topic Date Due    Shingles vaccine (1 of 2) Never done    Respiratory Syncytial Virus (RSV) Pregnant or age 60 yrs+ (1 - 1-dose 60+ series) Never done    COVID-19 Vaccine (4 - 2023-24 season) 09/01/2023    Annual Wellness Visit (Medicare Advantage)  01/01/2024     Past Medical History:      Diagnosis Date    Aortic stenosis     COPD (chronic obstructive pulmonary disease) ([facility])     Disorder of bone     Elevated hemoglobin A1c     Fatigue     Hyperlipidemia     Hypertension     Open wound of right forearm 2/8/2023    Skin only    Smoker     Traumatic ulcer of lower leg, right, with fat layer exposed ([facility]) 3/28/2018    Wound of right leg      Past Surgical History:    No past surgical history on file.  Allergies:    Patient has no known allergies.  Social History:   Social History     Socioeconomic History    Marital status:      Spouse name: Not on file    Number of children: Not on file    Years of education: Not on file    Highest education level: Not on file   Occupational History    Not on file   Tobacco Use

## 2024-05-20 NOTE — PROGRESS NOTES
S: 88 y.o. female with   Chief Complaint   Patient presents with    Hypotension       Anxiety  -f/u  -doing well on xanax and no concerns    O: VS:  height is 1.549 m (5' 0.98\") and weight is 49.7 kg (109 lb 9.1 oz). Her temporal temperature is 97.5 °F (36.4 °C). Her blood pressure is 102/52 (abnormal) and her pulse is 58. Her respiration is 16 and oxygen saturation is 97%.   BP Readings from Last 3 Encounters:   05/20/24 (!) 102/52   03/19/24 (!) 120/57   03/16/24 (!) 119/53     See resident note    Impression/Plan:   1) Anxiety - continue xanax       Health Maintenance Due   Topic Date Due    Shingles vaccine (1 of 2) Never done    Respiratory Syncytial Virus (RSV) Pregnant or age 60 yrs+ (1 - 1-dose 60+ series) Never done    COVID-19 Vaccine (4 - 2023-24 season) 09/01/2023    Annual Wellness Visit (Medicare Advantage)  01/01/2024         Attending Physician Statement  I have discussed the case, including pertinent history and exam findings with the resident.  I agree with the documented assessment and plan.      Remy Beverly, DO

## 2024-05-22 PROBLEM — I70.203 ATHEROSCLEROSIS OF ARTERY OF BOTH LOWER EXTREMITIES (HCC): Status: ACTIVE | Noted: 2023-09-14

## 2024-05-28 DIAGNOSIS — Z76.0 MEDICATION REFILL: ICD-10-CM

## 2024-05-28 RX ORDER — ALPRAZOLAM 0.5 MG/1
0.5 TABLET ORAL NIGHTLY
Qty: 30 TABLET | Refills: 2 | Status: SHIPPED | OUTPATIENT
Start: 2024-05-28 | End: 2024-08-26

## 2024-05-28 NOTE — TELEPHONE ENCOUNTER
Last Appointment   5/20/2024  Next Appointment  Visit date not found    Return in about 3 months (around 8/20/2024) for med refill.

## 2024-05-28 NOTE — TELEPHONE ENCOUNTER
PDMP reviewed and appropriate. Refills sent.     Leti Chin DO  Family Medicine Resident, PGY-2  [facility]  5/28/2024 1:12 PM

## 2024-07-25 NOTE — PROGRESS NOTES
Encounter created to assess patient's PDMP for alprazolam need. Patient last filled 7/23/24. Not due at this time.     Leti Chin DO  Family Medicine Resident, PGY-3  [facility]  7/25/2024 4:12 PM

## 2024-08-05 DIAGNOSIS — H04.123 DRY EYES: ICD-10-CM

## 2024-08-06 RX ORDER — PROPYLENE GLYCOL 0.06 MG/ML
SOLUTION/ DROPS OPHTHALMIC
Qty: 10 ML | Refills: 11 | Status: SHIPPED | OUTPATIENT
Start: 2024-08-06

## 2024-08-20 ENCOUNTER — OFFICE VISIT (OUTPATIENT)
Dept: FAMILY MEDICINE CLINIC | Age: 89
End: 2024-08-20

## 2024-08-20 VITALS
RESPIRATION RATE: 16 BRPM | SYSTOLIC BLOOD PRESSURE: 113 MMHG | BODY MASS INDEX: 21.69 KG/M2 | OXYGEN SATURATION: 96 % | DIASTOLIC BLOOD PRESSURE: 55 MMHG | WEIGHT: 114.86 LBS | HEART RATE: 75 BPM | TEMPERATURE: 97.4 F | HEIGHT: 61 IN

## 2024-08-20 DIAGNOSIS — Z76.0 MEDICATION REFILL: ICD-10-CM

## 2024-08-20 DIAGNOSIS — I70.203 ATHEROSCLEROSIS OF ARTERY OF BOTH LOWER EXTREMITIES (HCC): ICD-10-CM

## 2024-08-20 DIAGNOSIS — F41.9 ANXIETY: Primary | ICD-10-CM

## 2024-08-20 DIAGNOSIS — F51.01 PRIMARY INSOMNIA: ICD-10-CM

## 2024-08-20 RX ORDER — ALPRAZOLAM 0.5 MG/1
0.5 TABLET ORAL NIGHTLY
Qty: 30 TABLET | Refills: 2 | Status: SHIPPED | OUTPATIENT
Start: 2024-08-20 | End: 2024-11-18

## 2024-08-20 NOTE — PROGRESS NOTES
[facility]  Precepting Note    Subjective:  Assisted living now and ultimately glad she went.   On xanax 0.5mg nightly. Anxiety and insomnia.   Been on long term  Stable. Doing fine. Does not wish to taper/stop.   Facility administers.   Has been counseled on risks.   UDS UTD consistent.       Hx PVD, leg wound.   Does not want to do anything about it.   Declined vascular surgeon.   On Crestor. Does not take asa.   Smoking and will not stop.       ROS otherwise negative     Past medical, surgical, family and social history were reviewed, non-contributory, and unchanged unless otherwise stated.    Objective:    BP (!) 113/55   Pulse 75   Temp 97.4 °F (36.3 °C) (Temporal)   Resp 16   Ht 1.549 m (5' 0.98\")   Wt 52.1 kg (114 lb 13.8 oz)   SpO2 96%   BMI 21.71 kg/m²     Exam is as noted by resident with the following changes, additions or corrections:    General:  NAD; alert & oriented x 3   Heart:  RRR, no murmurs, gallops, or rubs.  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    Stable  Consistent.   Understands risks.   Happy with Tx.   Continue same.     PVD, declines further workup.      Attending Physician Statement  I have reviewed the chart, including any radiology or labs. I have discussed the case, including pertinent history and exam findings with the resident.  I agree with the assessment, plan and orders as documented by the resident.  Please refer to the resident note for additional information.      Electronically signed by AUGUSTINE CINTRON MD on 8/20/2024 at 10:16 AM  
Years of education: Not on file    Highest education level: Not on file   Occupational History    Not on file   Tobacco Use    Smoking status: Every Day     Current packs/day: 0.50     Types: Cigarettes    Smokeless tobacco: Never   Vaping Use    Vaping status: Never Used   Substance and Sexual Activity    Alcohol use: No    Drug use: No    Sexual activity: Not Currently   Other Topics Concern    Not on file   Social History Narrative    Not on file     Social Determinants of Health     Financial Resource Strain: Low Risk  (9/5/2023)    Overall Financial Resource Strain (CARDIA)     Difficulty of Paying Living Expenses: Not hard at all   Food Insecurity: No Food Insecurity (2/4/2024)    Hunger Vital Sign     Worried About Running Out of Food in the Last Year: Never true     Ran Out of Food in the Last Year: Never true   Transportation Needs: No Transportation Needs (2/4/2024)    PRAPARE - Transportation     Lack of Transportation (Medical): No     Lack of Transportation (Non-Medical): No   Physical Activity: Inactive (9/5/2023)    Exercise Vital Sign     Days of Exercise per Week: 0 days     Minutes of Exercise per Session: 0 min   Stress: Not on file   Social Connections: Not on file   Intimate Partner Violence: Not on file   Housing Stability: Low Risk  (2/4/2024)    Housing Stability Vital Sign     Unable to Pay for Housing in the Last Year: No     Number of Places Lived in the Last Year: 1     Unstable Housing in the Last Year: No      Family History:       Problem Relation Age of Onset    No Known Problems Mother     Stroke Father     No Known Problems Brother      Review of Systems:   As above.    PHYSICAL EXAM     Vitals: BP (!) 113/55   Pulse 75   Temp 97.4 °F (36.3 °C) (Temporal)   Resp 16   Ht 1.549 m (5' 0.98\")   Wt 52.1 kg (114 lb 13.8 oz)   SpO2 96%   BMI 21.71 kg/m²   Physical Exam  Constitutional:       General: She is not in acute distress.  HENT:      Head: Normocephalic and atraumatic.

## 2024-10-09 DIAGNOSIS — R41.0 CONFUSION: Primary | ICD-10-CM

## 2024-10-09 NOTE — PROGRESS NOTES
Psychiatry referral placed. Facility faxed asking for referral order.    Leti Chin DO  Family Medicine Resident, PGY-3  [facility]  10/9/2024 5:19 PM

## 2024-11-19 ENCOUNTER — OFFICE VISIT (OUTPATIENT)
Dept: FAMILY MEDICINE CLINIC | Age: 89
End: 2024-11-19

## 2024-11-19 VITALS
HEIGHT: 61 IN | RESPIRATION RATE: 16 BRPM | WEIGHT: 124 LBS | TEMPERATURE: 97.4 F | SYSTOLIC BLOOD PRESSURE: 116 MMHG | DIASTOLIC BLOOD PRESSURE: 70 MMHG | HEART RATE: 62 BPM | BODY MASS INDEX: 23.41 KG/M2 | OXYGEN SATURATION: 95 %

## 2024-11-19 DIAGNOSIS — F41.9 ANXIETY: ICD-10-CM

## 2024-11-19 DIAGNOSIS — F51.01 PRIMARY INSOMNIA: Primary | ICD-10-CM

## 2024-11-19 RX ORDER — ALPRAZOLAM 0.5 MG
0.5 TABLET ORAL NIGHTLY PRN
COMMUNITY
Start: 2024-10-29 | End: 2024-11-19 | Stop reason: SDUPTHER

## 2024-11-19 RX ORDER — ALPRAZOLAM 0.5 MG
0.5 TABLET ORAL NIGHTLY PRN
Qty: 30 TABLET | Refills: 2 | Status: SHIPPED | OUTPATIENT
Start: 2024-11-19 | End: 2025-02-17

## 2024-11-19 SDOH — ECONOMIC STABILITY: FOOD INSECURITY: WITHIN THE PAST 12 MONTHS, YOU WORRIED THAT YOUR FOOD WOULD RUN OUT BEFORE YOU GOT MONEY TO BUY MORE.: NEVER TRUE

## 2024-11-19 SDOH — ECONOMIC STABILITY: FOOD INSECURITY: WITHIN THE PAST 12 MONTHS, THE FOOD YOU BOUGHT JUST DIDN'T LAST AND YOU DIDN'T HAVE MONEY TO GET MORE.: NEVER TRUE

## 2024-11-19 SDOH — ECONOMIC STABILITY: INCOME INSECURITY: HOW HARD IS IT FOR YOU TO PAY FOR THE VERY BASICS LIKE FOOD, HOUSING, MEDICAL CARE, AND HEATING?: NOT HARD AT ALL

## 2024-11-19 NOTE — PROGRESS NOTES
[facility] Medicine Residency Program    CC: Virginia Ahuja is a 89 y.o. yo female is here for evaluation evaluation for the following medical concerns: Anxiety and Other (Had flu shot at assisted living)      HPI:     Anxiety, insomnia- alprazolam 0.5 mg nightly, stable, requesting refills, documented previously extensive conversations with patient regarding morbidity and mortality risks of continuing these medications at her age during which patient verbalizes understanding and continues to want therapy, pdmp reviewed, patient due for refills to be sent to facility, denies falls recently     Tobacco use- not interested in quitting at this time, chronic everyday user, requests her daily \" Coke and smoke\" at facility    Living at assisted living, [facility], is enjoying living there, has friends, goes to the activities like [employer], shopping, concerts; bingo    ROS negative unless otherwise noted    Vitals:   /70 (Site: Left Upper Arm)   Pulse 62   Temp 97.4 °F (36.3 °C) (Temporal)   Resp 16   Ht 1.549 m (5' 0.98\")   Wt 56.2 kg (124 lb)   SpO2 95%   BMI 23.44 kg/m²   Wt Readings from Last 3 Encounters:   11/19/24 56.2 kg (124 lb)   08/20/24 52.1 kg (114 lb 13.8 oz)   05/20/24 49.7 kg (109 lb 9.1 oz)       PE:  Physical Exam  Constitutional:       General: She is not in acute distress.  HENT:      Head: Normocephalic and atraumatic.   Eyes:      Extraocular Movements: Extraocular movements intact.   Cardiovascular:      Rate and Rhythm: Normal rate and regular rhythm.      Heart sounds: No murmur heard.     No friction rub. No gallop.   Pulmonary:      Breath sounds: Normal breath sounds. No wheezing, rhonchi or rales.   Abdominal:      Tenderness: There is no abdominal tenderness. There is no guarding.   Musculoskeletal:      Right lower leg: No edema.      Left lower leg: No edema.   Neurological:      Mental Status: She is alert.         A / P:     Diagnosis Orders   1. Primary

## 2024-11-19 NOTE — PROGRESS NOTES
S: 89 y.o. female with   Chief Complaint   Patient presents with    Anxiety    Other     Had flu shot at assisted living       89 yo female doing well.  Not interested in DC'ing her xanax or cigarettes.    O: VS:  height is 1.549 m (5' 0.98\") and weight is 56.2 kg (124 lb). Her temporal temperature is 97.4 °F (36.3 °C). Her blood pressure is 116/70 and her pulse is 62. Her respiration is 16 and oxygen saturation is 95%.   BP Readings from Last 3 Encounters:   11/19/24 116/70   08/20/24 (!) 113/55   05/20/24 (!) 102/52     See resident note      Impression/Plan:    Doing well overall - no changes for any of the following.  1) Insomnia  2) Tobacco abuse  3) Anxiety  4) Continue q 3 months FU. (AWV next visit).      Health Maintenance Due   Topic Date Due    Shingles vaccine (1 of 2) Never done    Respiratory Syncytial Virus (RSV) Pregnant or age 60 yrs+ (1 - 1-dose 75+ series) Never done    Annual Wellness Visit (Medicare Advantage)  01/01/2024    Flu vaccine (1) 08/01/2024    COVID-19 Vaccine (4 - 2023-24 season) 09/01/2024    Lipids  10/16/2024         Attending Physician Statement  I have discussed the case, including pertinent history and exam findings with the resident. I agree with the documented assessment and plan.      Carlos Thomas MD

## 2025-03-05 ENCOUNTER — OFFICE VISIT (OUTPATIENT)
Dept: FAMILY MEDICINE CLINIC | Age: 89
End: 2025-03-05
Payer: MEDICARE

## 2025-03-05 VITALS
SYSTOLIC BLOOD PRESSURE: 107 MMHG | OXYGEN SATURATION: 99 % | TEMPERATURE: 98.6 F | HEART RATE: 79 BPM | BODY MASS INDEX: 23.06 KG/M2 | DIASTOLIC BLOOD PRESSURE: 62 MMHG | RESPIRATION RATE: 17 BRPM | WEIGHT: 122 LBS

## 2025-03-05 DIAGNOSIS — F41.9 ANXIETY: ICD-10-CM

## 2025-03-05 DIAGNOSIS — F51.01 PRIMARY INSOMNIA: ICD-10-CM

## 2025-03-05 PROCEDURE — 1123F ACP DISCUSS/DSCN MKR DOCD: CPT

## 2025-03-05 PROCEDURE — 1159F MED LIST DOCD IN RCRD: CPT

## 2025-03-05 PROCEDURE — 99213 OFFICE O/P EST LOW 20 MIN: CPT

## 2025-03-05 RX ORDER — ALPRAZOLAM 0.5 MG
0.5 TABLET ORAL NIGHTLY PRN
Qty: 30 TABLET | Refills: 2 | Status: SHIPPED | OUTPATIENT
Start: 2025-03-05 | End: 2025-06-03

## 2025-03-05 NOTE — PROGRESS NOTES
[facility]  Precepting Note    Subjective:  Here for medication refill  Anxiety on xanax chronically   1ppd smoker  Lives at assisted living, takes medications as prescribed     ROS otherwise negative     Past medical, surgical, family and social history were reviewed, non-contributory, and unchanged unless otherwise stated.    Objective:    /62   Pulse 79   Temp 98.6 °F (37 °C) (Temporal)   Resp 17   Wt 55.3 kg (122 lb)   SpO2 99%   BMI 23.06 kg/m²     Exam is as noted by resident with the following changes, additions or corrections:      Assessment/Plan:  Anxiety- Xanax refilled. Follow up 3 months   PDMP reviewed and consistent      Attending Physician Statement  I have reviewed the chart, including any radiology or labs. I have discussed the case, including pertinent history and exam findings with the resident.  I agree with the assessment, plan and orders as documented by the resident.  Please refer to the resident note for additional information.      Electronically signed by Adrian Nelson MD on 3/5/2025 at 3:29 PM

## 2025-03-05 NOTE — PROGRESS NOTES
[facility]  Department of Family Medicine  Family Medicine Residency Program      Patient: Virginia Ahuja 89 y.o. female     Date of Service: 3/5/25      Chief complaint:   Chief Complaint   Patient presents with    Insomnia       HISTORY OF PRESENTING ILLNESS     89 y.o. female PMH COPD, stage IIIb chronic kidney disease, BASILIA, hyperlipidemia, history of smoking presented to the clinic with a chief complaint mentioned above:     Patient is accompanied with her daughter today.   She is currently feeling okay, denies having any chest pain, shortness of breath, abdominal pain, troubles moving her bowels or urinating.  Requesting to get a refill for alprazolam 0.5 mg nightly.  She has been on the same medication for many years and uses it for insomnia and anxiety.  She currently resides in an assisted living facility: [facility]  She is a smoker, and does not want to give up on it. States that she enjoys it.      Health Maintenance:  Health Maintenance Due   Topic Date Due    Shingles vaccine (1 of 2) Never done    Respiratory Syncytial Virus (RSV) Pregnant or age 60 yrs+ (1 - 1-dose 75+ series) Never done    Flu vaccine (1) 08/01/2024    COVID-19 Vaccine (4 - 2024-25 season) 09/01/2024    Lipids  10/16/2024    Annual Wellness Visit (Medicare)  03/05/2025    Depression Screen  02/14/2025     Past Medical History:      Diagnosis Date    Aortic stenosis     COPD (chronic obstructive pulmonary disease) (HCC)     Disorder of bone     Elevated hemoglobin A1c     Fatigue     Hyperlipidemia     Hypertension     Open wound of right forearm 2/8/2023    Skin only    Smoker     Traumatic ulcer of lower leg, right, with fat layer exposed ([facility]) 3/28/2018    Wound of right leg      Past Surgical History:    No past surgical history on file.  Allergies:    Patient has no known allergies.  Social History:   Social History     Socioeconomic History    Marital status:      Spouse name: Not on file

## 2025-03-10 RX ORDER — FUROSEMIDE 20 MG/1
TABLET ORAL
Qty: 15 TABLET | Refills: 5 | Status: SHIPPED | OUTPATIENT
Start: 2025-03-10

## 2025-03-10 NOTE — TELEPHONE ENCOUNTER
Name of Medication(s) Requested:  Requested Prescriptions     Pending Prescriptions Disp Refills    furosemide (LASIX) 20 MG tablet [Pharmacy Med Name: FUROSEMIDE 20 MG TABLET] 15 tablet 5     Si TABLET BY MOUTH EVERY OTHER DAY       Medication is on current medication list Yes    Dosage and directions were verified? Yes    Quantity verified: 90 day supply     Pharmacy Verified?  Yes    Last Appointment:  Visit date not found    Future appts:  Future Appointments   Date Time Provider Department Center   2025  2:15 PM Leti Chin DO [facility] DEP        (If no appt send self scheduling link. .REFILLAPPT)  Scheduling request sent?     [] Yes  [x] No    Does patient need updated?  [] Yes  [x] No

## 2025-03-24 DIAGNOSIS — E78.2 MIXED HYPERLIPIDEMIA: ICD-10-CM

## 2025-03-24 RX ORDER — ROSUVASTATIN CALCIUM 5 MG/1
TABLET, COATED ORAL
Qty: 30 TABLET | Refills: 11 | Status: SHIPPED | OUTPATIENT
Start: 2025-03-24

## 2025-03-24 NOTE — TELEPHONE ENCOUNTER
Name of Medication(s) Requested:  Requested Prescriptions     Pending Prescriptions Disp Refills    rosuvastatin (CRESTOR) 5 MG tablet [Pharmacy Med Name: ROSUVASTATIN CALCIUM 5 MG TAB] 30 tablet 11     Si TABLET BY MOUTH DAILY       Medication is on current medication list Yes    Dosage and directions were verified? Yes    Quantity verified: 30 day supply     Pharmacy Verified?  Yes    Last Appointment:  Visit date not found    Future appts:  Future Appointments   Date Time Provider Department Center   2025  2:15 PM Leti Chin DO [facility] DEP        (If no appt send self scheduling link. .REFILLAPPT)  Scheduling request sent?     [] Yes  [x] No    Does patient need updated?  [] Yes  [x] No

## 2025-04-18 ENCOUNTER — OFFICE VISIT (OUTPATIENT)
Dept: FAMILY MEDICINE CLINIC | Age: 89
End: 2025-04-18
Payer: MEDICARE

## 2025-04-18 VITALS
SYSTOLIC BLOOD PRESSURE: 113 MMHG | DIASTOLIC BLOOD PRESSURE: 64 MMHG | TEMPERATURE: 97 F | RESPIRATION RATE: 16 BRPM | HEART RATE: 66 BPM | OXYGEN SATURATION: 96 % | WEIGHT: 122 LBS | HEIGHT: 61 IN | BODY MASS INDEX: 23.03 KG/M2

## 2025-04-18 DIAGNOSIS — F13.20 SEDATIVE, HYPNOTIC OR ANXIOLYTIC DEPENDENCE, UNCOMPLICATED (HCC): ICD-10-CM

## 2025-04-18 DIAGNOSIS — Z00.00 MEDICARE ANNUAL WELLNESS VISIT, SUBSEQUENT: Primary | ICD-10-CM

## 2025-04-18 DIAGNOSIS — G31.84 MILD COGNITIVE IMPAIRMENT: ICD-10-CM

## 2025-04-18 DIAGNOSIS — J44.9 CHRONIC OBSTRUCTIVE PULMONARY DISEASE, UNSPECIFIED COPD TYPE (HCC): ICD-10-CM

## 2025-04-18 DIAGNOSIS — I35.0 MODERATE AORTIC STENOSIS: ICD-10-CM

## 2025-04-18 DIAGNOSIS — N18.32 STAGE 3B CHRONIC KIDNEY DISEASE (HCC): ICD-10-CM

## 2025-04-18 PROBLEM — L03.90 CELLULITIS: Status: RESOLVED | Noted: 2023-10-18 | Resolved: 2025-04-18

## 2025-04-18 PROBLEM — M79.609 PAIN IN LIMB: Status: RESOLVED | Noted: 2019-03-18 | Resolved: 2025-04-18

## 2025-04-18 PROBLEM — B35.1 ONYCHOMYCOSIS: Status: RESOLVED | Noted: 2019-03-18 | Resolved: 2025-04-18

## 2025-04-18 PROBLEM — Z71.85 IMMUNIZATION COUNSELING: Status: RESOLVED | Noted: 2018-10-17 | Resolved: 2025-04-18

## 2025-04-18 PROBLEM — R60.0 BILATERAL LOWER EXTREMITY EDEMA: Status: RESOLVED | Noted: 2023-04-28 | Resolved: 2025-04-18

## 2025-04-18 PROBLEM — I95.9 HYPOTENSION: Status: RESOLVED | Noted: 2023-05-26 | Resolved: 2025-04-18

## 2025-04-18 PROBLEM — Z23 FLU VACCINE NEED: Status: RESOLVED | Noted: 2022-12-05 | Resolved: 2025-04-18

## 2025-04-18 PROCEDURE — 1123F ACP DISCUSS/DSCN MKR DOCD: CPT

## 2025-04-18 PROCEDURE — G0439 PPPS, SUBSEQ VISIT: HCPCS

## 2025-04-18 PROCEDURE — 1159F MED LIST DOCD IN RCRD: CPT

## 2025-04-18 RX ORDER — MEMANTINE HYDROCHLORIDE 10 MG/1
10 TABLET ORAL DAILY
COMMUNITY
Start: 2025-04-01

## 2025-04-18 RX ORDER — RIVASTIGMINE TARTRATE 6 MG/1
6 CAPSULE ORAL 2 TIMES DAILY
COMMUNITY
Start: 2025-03-27

## 2025-04-18 SDOH — ECONOMIC STABILITY: FOOD INSECURITY: WITHIN THE PAST 12 MONTHS, THE FOOD YOU BOUGHT JUST DIDN'T LAST AND YOU DIDN'T HAVE MONEY TO GET MORE.: NEVER TRUE

## 2025-04-18 SDOH — ECONOMIC STABILITY: FOOD INSECURITY: WITHIN THE PAST 12 MONTHS, YOU WORRIED THAT YOUR FOOD WOULD RUN OUT BEFORE YOU GOT MONEY TO BUY MORE.: NEVER TRUE

## 2025-04-18 ASSESSMENT — PATIENT HEALTH QUESTIONNAIRE - PHQ9
SUM OF ALL RESPONSES TO PHQ QUESTIONS 1-9: 3
5. POOR APPETITE OR OVEREATING: NOT AT ALL
SUM OF ALL RESPONSES TO PHQ QUESTIONS 1-9: 3
1. LITTLE INTEREST OR PLEASURE IN DOING THINGS: NEARLY EVERY DAY
10. IF YOU CHECKED OFF ANY PROBLEMS, HOW DIFFICULT HAVE THESE PROBLEMS MADE IT FOR YOU TO DO YOUR WORK, TAKE CARE OF THINGS AT HOME, OR GET ALONG WITH OTHER PEOPLE: NOT DIFFICULT AT ALL
6. FEELING BAD ABOUT YOURSELF - OR THAT YOU ARE A FAILURE OR HAVE LET YOURSELF OR YOUR FAMILY DOWN: NOT AT ALL
8. MOVING OR SPEAKING SO SLOWLY THAT OTHER PEOPLE COULD HAVE NOTICED. OR THE OPPOSITE, BEING SO FIGETY OR RESTLESS THAT YOU HAVE BEEN MOVING AROUND A LOT MORE THAN USUAL: NOT AT ALL
2. FEELING DOWN, DEPRESSED OR HOPELESS: NOT AT ALL
SUM OF ALL RESPONSES TO PHQ QUESTIONS 1-9: 3
SUM OF ALL RESPONSES TO PHQ QUESTIONS 1-9: 3
3. TROUBLE FALLING OR STAYING ASLEEP: NOT AT ALL
7. TROUBLE CONCENTRATING ON THINGS, SUCH AS READING THE NEWSPAPER OR WATCHING TELEVISION: NOT AT ALL
9. THOUGHTS THAT YOU WOULD BE BETTER OFF DEAD, OR OF HURTING YOURSELF: NOT AT ALL
4. FEELING TIRED OR HAVING LITTLE ENERGY: NOT AT ALL

## 2025-04-18 ASSESSMENT — LIFESTYLE VARIABLES: HOW MANY STANDARD DRINKS CONTAINING ALCOHOL DO YOU HAVE ON A TYPICAL DAY: PATIENT DOES NOT DRINK

## 2025-04-18 NOTE — PROGRESS NOTES
Medicare Annual Wellness Visit    Virginia Ahuja is here for Medicare AWV    Assessment & Plan   Medicare annual wellness visit, subsequent  Due for repeat in 1 year    Mild cognitive impairment  Abnormal clock drawing, per discussion with patient and daughter, they are not concerned about patient's safety.  Patient does not drive.  She lives in assisted living with staff available to help with dressing/bathing as needed and the facility distributes her medications to her.  Continue to monitor symptoms at this time.    Sedative, hypnotic or anxiolytic dependence, uncomplicated (HCC)  Patient declines weaning off of Xanax at this time see previous documentation for risks benefit discussion, not due for refills at this time    Chronic obstructive pulmonary disease, unspecified COPD type ([facility])  Patient reports her breathing is stable, not interested in tobacco cessation    Stage 3b chronic kidney disease ([facility])  Baseline creatinine 1.2-1.3, patient declines blood work at this time    Moderate aortic stenosis  Seen on echo from 2017, systolic murmur appreciated on exam, patient asymptomatic, continue to monitor symptoms     No follow-ups on file.     Subjective     PMH CKD 3a, AS, COPD, HTN, HLD, insomnia, BASILIA    Anxiety, insomnia:  alprazolam 0.5 mg nightly, stable, currently has refills, documented previously extensive conversations with patient regarding morbidity and mortality risks of continuing these medications at her age during which patient verbalizes understanding and continues to want therapy, pdmp reviewed, denies falls,  dizziness, lightheadedness      Tobacco use, COPD:  Not interested in quitting at this time, chronic everyday user, requests her daily \"Coke and smoke\" at facility    PAD:  Recommended CTA with run off lower extremities for the presence of a high-grade stenosis and/or occlusion on the right lower extremity, patient declines CTA with run off at this time, citing she has no interest in seeing

## 2025-04-18 NOTE — PROGRESS NOTES
S: 89 y.o. female with   Chief Complaint   Patient presents with    Medicare AWV       PT is here for her AWV.  Pt is doing well.  She is stable on her current meds.  Living at the [facility] (assisted living) and is doing well there.     O: VS:  height is 1.549 m (5' 0.98\") and weight is 55.3 kg (122 lb). Her temporal temperature is 97 °F (36.1 °C). Her blood pressure is 113/64 and her pulse is 66. Her respiration is 16 and oxygen saturation is 96%.   BP Readings from Last 3 Encounters:   04/18/25 113/64   03/05/25 107/62   11/19/24 116/70     See resident note    Impression/Plan:   1) AWV - see resident note  2) high grade stenosis - pt on crestor low dose, has low LDL. Will inquire about getting lipids rechecked today.  3) anxiety - RTC in 3 mn.      Health Maintenance Due   Topic Date Due    Shingles vaccine (1 of 2) Never done    Respiratory Syncytial Virus (RSV) Pregnant or age 60 yrs+ (1 - 1-dose 75+ series) Never done    COVID-19 Vaccine (4 - 2024-25 season) 09/01/2024    Lipids  10/16/2024         Attending Physician Statement  I have discussed the case, including pertinent history and exam findings with the resident.  I agree with the documented assessment and plan.      Sagrario Arroyo MD

## 2025-05-29 DIAGNOSIS — F41.9 ANXIETY: ICD-10-CM

## 2025-05-29 DIAGNOSIS — F51.01 PRIMARY INSOMNIA: ICD-10-CM

## 2025-05-29 RX ORDER — ALPRAZOLAM 0.5 MG
0.5 TABLET ORAL NIGHTLY PRN
Qty: 30 TABLET | Refills: 2 | Status: SHIPPED | OUTPATIENT
Start: 2025-06-22 | End: 2025-09-20

## 2025-05-29 NOTE — TELEPHONE ENCOUNTER
PDMP reviewed, last filled 5/23/25. Timed prescription starting 6/22/25 with 2 refills. Patient okay to cancel appt next week as she was just seen in April. Will need appt in September for refills. Please inform her she will be reassigned to a resident here since I will be graduating or I can see her at [facility]. In the meantime, she should continue to call [facility] with any needs.     Leti Chin DO  Family Medicine Resident, PGY-3  [facility]  5/29/2025 3:09 PM    29-Jul-2019 22:13

## 2025-05-29 NOTE — TELEPHONE ENCOUNTER
Name of Medication(s) Requested:  Requested Prescriptions     Pending Prescriptions Disp Refills    ALPRAZolam (XANAX) 0.5 MG tablet 30 tablet 2     Sig: Take 1 tablet by mouth nightly as needed for Anxiety for up to 90 days. Max Daily Amount: 0.5 mg       Medication is on current medication list Yes    Dosage and directions were verified? Yes    Quantity verified: 90 day supply     Pharmacy Verified?  Yes    Last Appointment:  4/18/2025    Future appts:  Future Appointments   Date Time Provider Department Center   6/2/2025 10:00 AM Leti Chin DO [facility] DEP        (If no appt send self scheduling link. .REFILLAPPT)  Scheduling request sent?     [] Yes  [x] No    Does patient need updated?  [x] Yes  [] No

## 2025-06-03 ENCOUNTER — HOSPITAL ENCOUNTER (EMERGENCY)
Age: 89
Discharge: HOME | End: 2025-06-03
Attending: EMERGENCY MEDICINE
Payer: MEDICARE

## 2025-06-03 ENCOUNTER — APPOINTMENT (OUTPATIENT)
Dept: CT IMAGING | Age: 89
End: 2025-06-03
Payer: MEDICARE

## 2025-06-03 ENCOUNTER — TELEPHONE (OUTPATIENT)
Dept: FAMILY MEDICINE CLINIC | Age: 89
End: 2025-06-03

## 2025-06-03 VITALS
WEIGHT: 125 LBS | BODY MASS INDEX: 24.54 KG/M2 | HEIGHT: 60 IN | OXYGEN SATURATION: 97 % | RESPIRATION RATE: 18 BRPM | DIASTOLIC BLOOD PRESSURE: 65 MMHG | SYSTOLIC BLOOD PRESSURE: 121 MMHG | HEART RATE: 79 BPM | TEMPERATURE: 98 F

## 2025-06-03 DIAGNOSIS — E04.1 THYROID NODULE GREATER THAN OR EQUAL TO 1 CM IN DIAMETER INCIDENTALLY NOTED ON IMAGING STUDY: ICD-10-CM

## 2025-06-03 DIAGNOSIS — S51.011A SKIN TEAR OF RIGHT ELBOW WITHOUT COMPLICATION, INITIAL ENCOUNTER: ICD-10-CM

## 2025-06-03 DIAGNOSIS — E04.1 THYROID NODULE: Primary | ICD-10-CM
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
## 2025-06-03 PROCEDURE — 72125 CT NECK SPINE W/O DYE: CPT

## 2025-06-03 PROCEDURE — 99284 EMERGENCY DEPT VISIT MOD MDM: CPT

## 2025-06-03 PROCEDURE — 70450 CT HEAD/BRAIN W/O DYE: CPT

## 2025-06-03 NOTE — DISCHARGE INSTRUCTIONS
CT CSpine W/O Contrast   Final Result   1. No acute abnormality of the cervical spine.   2. 2.3 cm heterogeneous and calcified right thyroid nodule. 1.1 cm calcified   left thyroid nodule.  Correlation with thyroid sonogram recommended.         CT Head W/O Contrast   Final Result   No acute intracranial abnormality.           Return for any confusion, nausea, vomiting, or fevers.  Otherwise follow up with your primary care physician for the emergency room visit and for the incidental thyroid nodules.

## 2025-06-03 NOTE — ED PROVIDER NOTES
[facility] EMERGENCY DEPARTMENT  EMERGENCY DEPARTMENT ENCOUNTER        Pt Name: Virginia Ahuja  MRN: [id number]  Birthdate 1935  Date of evaluation: 6/3/2025  Provider: Catrina Vaughn DO  PCP: Leti Chin DO  Note Started: 12:59 PM EDT 6/3/25    CHIEF COMPLAINT       Chief Complaint   Patient presents with    Fall     Mechanical fall on thinners, no LOC       HISTORY OF PRESENT ILLNESS: 1 or more Elements   Virginia Ahuja is a 89 y.o. female with a past medical history of stage III CKD and COPD who presents to the emergency department with chief complaint of fall.  Patient was using the restroom at the grocery store states that she lost her balance and fell striking her head.  She denies loss of consciousness.  Denies weakness or nausea vomiting since.  Patient is alert and oriented on arrival.  She has been in good health as of lately.  Patient does take Eliquis and has been compliant with her medications.  Otherwise denies shortness of breath, fevers, chills, abdominal pain, chest pain, or neck or back pain.    Nursing Notes were all reviewed and agreed with or any disagreements were addressed in the HPI.    REVIEW OF SYSTEMS :    Positives and Pertinent negatives as per HPI.    PAST MEDICAL HISTORY/Chronic Conditions Affecting Care    has a past medical history of Acute atopic conjunctivitis (10/01/2013), Aortic stenosis, Bilateral lower extremity edema (04/28/2023), Cellulitis (10/18/2023), Contact dermatitis (10/01/2013), COPD (chronic obstructive pulmonary disease) ([facility]), Disorder of bone, Elevated hemoglobin A1c, Fatigue, Flu vaccine need (12/05/2022), Hyperlipidemia, Hypertension, Hypotension (05/26/2023), Immunization counseling (10/17/2018), Onychomycosis (03/18/2019), Open wound of right forearm (02/08/2023), Pain in limb (03/18/2019), Smoker, Traumatic ulcer of lower leg, right, with fat layer exposed ([facility]) (03/28/2018), and Wound of right leg.     SURGICAL HISTORY

## 2025-06-03 NOTE — TELEPHONE ENCOUNTER
Last Appointment   4/18/2025  Next Appointment  Visit date not found  [facility] [employer] called in that patient was recently in the ER, please review Ct results as there was a suggestion on further testing that she might need. If she needs to be seen, daughter will bring her in. Please advise.

## 2025-06-04 NOTE — TELEPHONE ENCOUNTER
I have ordered the recommended follow up thyroid ultrasound. I encourage the patient to get the ultrasound then we will determine follow up after that point.     Leti Chin DO  Family Medicine Resident, PGY-3  [facility]  6/4/2025 10:42 AM

## 2025-06-30 DIAGNOSIS — F51.01 PRIMARY INSOMNIA: ICD-10-CM

## 2025-06-30 DIAGNOSIS — F41.9 ANXIETY: ICD-10-CM

## 2025-07-01 NOTE — TELEPHONE ENCOUNTER
Xanax needs sent to [facility]- pt only has one pill left    phone [phone]  Fax [phone]

## 2025-07-02 RX ORDER — ALPRAZOLAM 0.5 MG
0.5 TABLET ORAL NIGHTLY PRN
Qty: 30 TABLET | Refills: 0 | Status: SHIPPED | OUTPATIENT
Start: 2025-07-02 | End: 2025-09-30

## 2025-07-02 RX ORDER — ALPRAZOLAM 0.5 MG
0.5 TABLET ORAL NIGHTLY PRN
Qty: 30 TABLET | Refills: 2 | OUTPATIENT
Start: 2025-07-02 | End: 2025-09-30

## 2025-07-25 ENCOUNTER — OFFICE VISIT (OUTPATIENT)
Dept: FAMILY MEDICINE CLINIC | Age: 89
End: 2025-07-25

## 2025-07-25 VITALS
OXYGEN SATURATION: 98 % | HEIGHT: 60 IN | HEART RATE: 67 BPM | BODY MASS INDEX: 24.54 KG/M2 | DIASTOLIC BLOOD PRESSURE: 55 MMHG | SYSTOLIC BLOOD PRESSURE: 121 MMHG | TEMPERATURE: 98 F | WEIGHT: 125 LBS | RESPIRATION RATE: 18 BRPM

## 2025-07-25 DIAGNOSIS — N18.32 STAGE 3B CHRONIC KIDNEY DISEASE (HCC): ICD-10-CM

## 2025-07-25 DIAGNOSIS — I35.0 MODERATE AORTIC STENOSIS: ICD-10-CM
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
## 2025-07-25 LAB
ALBUMIN: 3.9 G/DL (ref 3.5–5.2)
ALP BLD-CCNC: 142 U/L (ref 35–104)
ALT SERPL-CCNC: 17 U/L (ref 0–35)
ANION GAP SERPL CALCULATED.3IONS-SCNC: 14 MMOL/L (ref 7–16)
AST SERPL-CCNC: 31 U/L (ref 0–35)
BASOPHILS ABSOLUTE: 0.03 K/UL (ref 0–0.2)
BASOPHILS RELATIVE PERCENT: 0 % (ref 0–2)
BILIRUB SERPL-MCNC: 0.4 MG/DL (ref 0–1.2)
BUN BLDV-MCNC: 29 MG/DL (ref 8–23)
CALCIUM SERPL-MCNC: 9.1 MG/DL (ref 8.8–10.2)
CHLORIDE BLD-SCNC: 106 MMOL/L (ref 98–107)
CHOLESTEROL, TOTAL: 106 MG/DL
CO2: 21 MMOL/L (ref 22–29)
CREAT SERPL-MCNC: 1.3 MG/DL (ref 0.5–1)
EOSINOPHILS ABSOLUTE: 0 K/UL (ref 0.05–0.5)
EOSINOPHILS RELATIVE PERCENT: 0 % (ref 0–6)
GFR, ESTIMATED: 41 ML/MIN/1.73M2
GLUCOSE BLD-MCNC: 134 MG/DL (ref 74–99)
HCT VFR BLD CALC: 38.6 % (ref 34–48)
HDLC SERPL-MCNC: 61 MG/DL
HEMOGLOBIN: 12.4 G/DL (ref 11.5–15.5)
IMMATURE GRANULOCYTES %: 0 % (ref 0–5)
IMMATURE GRANULOCYTES ABSOLUTE: <0.03 K/UL (ref 0–0.58)
LDL CHOLESTEROL: 26 MG/DL
LYMPHOCYTES ABSOLUTE: 2.29 K/UL (ref 1.5–4)
LYMPHOCYTES RELATIVE PERCENT: 27 % (ref 20–42)
MCH RBC QN AUTO: 33.2 PG (ref 26–35)
MCHC RBC AUTO-ENTMCNC: 32.1 G/DL (ref 32–34.5)
MCV RBC AUTO: 103.5 FL (ref 80–99.9)
MONOCYTES ABSOLUTE: 0.74 K/UL (ref 0.1–0.95)
MONOCYTES RELATIVE PERCENT: 9 % (ref 2–12)
NEUTROPHILS ABSOLUTE: 5.42 K/UL (ref 1.8–7.3)
NEUTROPHILS RELATIVE PERCENT: 64 % (ref 43–80)
PDW BLD-RTO: 12.8 % (ref 11.5–15)
PLATELET, FLUORESCENCE: 190 K/UL (ref 130–450)
PMV BLD AUTO: 13.6 FL (ref 7–12)
POTASSIUM SERPL-SCNC: 4.9 MMOL/L (ref 3.5–5.1)
RBC # BLD: 3.73 M/UL (ref 3.5–5.5)
SODIUM BLD-SCNC: 141 MMOL/L (ref 136–145)
TOTAL PROTEIN: 7.2 G/DL (ref 6.4–8.3)
TRIGL SERPL-MCNC: 95 MG/DL
VLDLC SERPL CALC-MCNC: 19 MG/DL
WBC # BLD: 8.5 K/UL (ref 4.5–11.5)

## 2025-07-25 NOTE — PROGRESS NOTES
[facility]  Department of Family Medicine  Family Medicine Residency Program      Patient: Virginia Ahuja 89 y.o. female     Date of Service: 7/25/25      Chief complaint:   Chief Complaint   Patient presents with    Fall       HISTORY OF PRESENTING ILLNESS     89 y.o. female PMH moderate aortic stenosis, diastolic dysfunction, COPD, stage IIIb CKD, anxiety presented to the clinic from assisted living facility with the chief complaint mentioned above    Patient resides at [facility] (assisted living facility)  Is unsure of why she is actually here today.  Reports having a fall about 2 days ago.  She was going on a bus tour, was standing in the past which was stationary when she lost her balance and fell on her back.  She was picked up by her friends.  Denies losing any consciousness, denied hitting, denied bleeding.  States that this has been her first fall since this year.  Denies having any concerning symptoms such as chest pain, shortness of breath, dizziness, blurred vision prior to the fall.  States that she has not been getting any physical therapy at the facility  Is not on a blood thinner.    Health Maintenance:  Health Maintenance Due   Topic Date Due    Shingles vaccine (1 of 2) Never done    Respiratory Syncytial Virus (RSV) Pregnant or age 60 yrs+ (1 - 1-dose 75+ series) Never done    COVID-19 Vaccine (4 - 2024-25 season) 09/01/2024     Past Medical History:      Diagnosis Date    Acute atopic conjunctivitis 10/01/2013    Aortic stenosis     Bilateral lower extremity edema 04/28/2023    Cellulitis 10/18/2023    Contact dermatitis 10/01/2013    COPD (chronic obstructive pulmonary disease) (HCC)     Disorder of bone     Elevated hemoglobin A1c     Fatigue     Flu vaccine need 12/05/2022    Hyperlipidemia     Hypertension     Hypotension 05/26/2023    Immunization counseling 10/17/2018    Onychomycosis 03/18/2019    Open wound of right forearm 02/08/2023    Skin only    Pain in

## 2025-07-25 NOTE — PROGRESS NOTES
[facility]  Precepting Note    Subjective:  Fell 2 days ago on bus during bus tour  Lost her balance, no syncope  Here from facility alone and hx dementia so questionable history  No obvious injuries  Exam normal    ROS otherwise negative    Past medical, surgical, family and social history were reviewed, non-contributory, and unchanged unless otherwise stated.    Objective:    BP (!) 121/55   Pulse 67   Temp 98 °F (36.7 °C) (Temporal)   Resp 18   Ht 1.524 m (5')   Wt 56.7 kg (125 lb)   SpO2 98%   BMI 24.41 kg/m²     Exam is as noted by resident with the following changes, additions or corrections:        Assessment/Plan:    Fall - PT referral, check labs. Seems mechanical. Limited hx as pt alone here from facility. No report of pain and back to baseline.      Attending Physician Statement  I have reviewed the chart, including any radiology or labs.  I agree with the assessment, plan and orders as documented by the resident.  Please refer to the resident note for additional information.      Electronically signed by TIANA LUND MD on 7/25/2025 at 11:14 AM

## 2025-07-30 ENCOUNTER — CLINICAL DOCUMENTATION (OUTPATIENT)
Dept: FAMILY MEDICINE CLINIC | Age: 89
End: 2025-07-30

## 2025-07-30 NOTE — PROGRESS NOTES
[id number]  Today’s Date: 2025  Patient Name:Virginia Ahuja  Patient :89 y.o.  1935    Exercise Questions:  On average, how many days per week do you engage in moderate to strenuous exercise like a brisk walk?  No  In a typical week, how many times do you do muscle-strengthening activities (such as resistance training or heavy gardening)?  No  How many days per week do you perform activities that challenge your balance, like standing with feet together or walking on trails?  6-7 times a week, yes  Hand  Measurements:  Right Hand: 15.8  Left Hand: 15.8    Provided goal for life every day guide from [employer] on aging at [facility] for exercise and physical activity- no

## 2025-08-15 DIAGNOSIS — H04.123 DRY EYES: ICD-10-CM

## 2025-08-15 RX ORDER — PROPYLENE GLYCOL 0.06 MG/ML
SOLUTION/ DROPS OPHTHALMIC
Qty: 10 ML | Refills: 2 | Status: SHIPPED | OUTPATIENT
Start: 2025-08-15